# Patient Record
Sex: FEMALE | Race: WHITE | NOT HISPANIC OR LATINO | Employment: OTHER | URBAN - METROPOLITAN AREA
[De-identification: names, ages, dates, MRNs, and addresses within clinical notes are randomized per-mention and may not be internally consistent; named-entity substitution may affect disease eponyms.]

---

## 2017-06-09 ENCOUNTER — CLINICAL SUPPORT NO REQUIREMENTS (OUTPATIENT)
Dept: CARDIOLOGY | Facility: CLINIC | Age: 72
End: 2017-06-09

## 2017-06-09 DIAGNOSIS — I50.9 CHRONIC CONGESTIVE HEART FAILURE, UNSPECIFIED CONGESTIVE HEART FAILURE TYPE: Primary | ICD-10-CM

## 2017-06-09 PROCEDURE — 93284 PRGRMG EVAL IMPLANTABLE DFB: CPT | Performed by: INTERNAL MEDICINE

## 2017-06-09 PROCEDURE — 93290 INTERROG DEV EVAL ICPMS IP: CPT | Performed by: INTERNAL MEDICINE

## 2017-06-12 ENCOUNTER — TELEPHONE (OUTPATIENT)
Dept: CARDIOLOGY | Facility: CLINIC | Age: 72
End: 2017-06-12

## 2017-06-12 NOTE — TELEPHONE ENCOUNTER
Patient was calling to ask you to provide her the name of the lead that is causing her issues.  Who manufactures the part so she can find out why she cannot have it removed.

## 2017-10-08 ENCOUNTER — CLINICAL SUPPORT NO REQUIREMENTS (OUTPATIENT)
Dept: CARDIOLOGY | Facility: CLINIC | Age: 72
End: 2017-10-08

## 2017-10-08 DIAGNOSIS — I50.22 CHRONIC SYSTOLIC CONGESTIVE HEART FAILURE (HCC): Primary | ICD-10-CM

## 2017-10-08 PROCEDURE — 93295 DEV INTERROG REMOTE 1/2/MLT: CPT | Performed by: INTERNAL MEDICINE

## 2017-10-08 PROCEDURE — 93296 REM INTERROG EVL PM/IDS: CPT | Performed by: INTERNAL MEDICINE

## 2017-10-08 PROCEDURE — 93297 REM INTERROG DEV EVAL ICPMS: CPT | Performed by: INTERNAL MEDICINE

## 2017-10-09 ENCOUNTER — TELEPHONE (OUTPATIENT)
Dept: CARDIOLOGY | Facility: CLINIC | Age: 72
End: 2017-10-09

## 2017-10-09 NOTE — TELEPHONE ENCOUNTER
Alert remote transmission came through 10/8 and was reviewed today.  Alert was for LV lead impedance >3000.  Called pt and she states no new symptoms.  I spoke with Dr Randall and he felt that checking her LV lead when she sees Shoshana Power on 10/18/17 would be fine.  Dr Randall would like to review it when it is done.  This message will be sent to Shoshana and the R[facility] Dept pool.

## 2017-10-11 ENCOUNTER — DOCUMENTATION (OUTPATIENT)
Dept: CARDIOLOGY | Facility: CLINIC | Age: 72
End: 2017-10-11

## 2017-10-11 NOTE — PROGRESS NOTES
Patient came in today to have her alert turned off.  Per remote transmission from 10/8, alert is for LV lead impedance >3000.  I checked her LV lead and it will not capture even at highest setting (8.0V @ 1.5ms).  All other testing was normal.  Per Dr Randall, she can come in for her regularly scheduled appointment with Shoshana Power on 10/18 and he will look at it at that time.

## 2017-10-18 ENCOUNTER — OFFICE VISIT (OUTPATIENT)
Dept: CARDIOLOGY | Facility: CLINIC | Age: 72
End: 2017-10-18

## 2017-10-18 ENCOUNTER — CLINICAL SUPPORT NO REQUIREMENTS (OUTPATIENT)
Dept: CARDIOLOGY | Facility: CLINIC | Age: 72
End: 2017-10-18

## 2017-10-18 ENCOUNTER — HOSPITAL ENCOUNTER (OUTPATIENT)
Dept: GENERAL RADIOLOGY | Facility: HOSPITAL | Age: 72
Discharge: HOME | End: 2017-10-18
Admitting: PHYSICIAN ASSISTANT

## 2017-10-18 VITALS
SYSTOLIC BLOOD PRESSURE: 134 MMHG | BODY MASS INDEX: 31.28 KG/M2 | DIASTOLIC BLOOD PRESSURE: 86 MMHG | OXYGEN SATURATION: 98 % | WEIGHT: 170 LBS | HEART RATE: 63 BPM | HEIGHT: 62 IN

## 2017-10-18 DIAGNOSIS — I48.0 PAROXYSMAL ATRIAL FIBRILLATION (HCC): Primary | ICD-10-CM

## 2017-10-18 DIAGNOSIS — Z98.890 S/P MVR (MITRAL VALVE REPAIR): ICD-10-CM

## 2017-10-18 DIAGNOSIS — I50.22 CHRONIC SYSTOLIC CONGESTIVE HEART FAILURE (HCC): Primary | ICD-10-CM

## 2017-10-18 DIAGNOSIS — Z95.810 BIVENTRICULAR IMPLANTABLE CARDIOVERTER-DEFIBRILLATOR IN SITU: ICD-10-CM

## 2017-10-18 PROBLEM — E78.00 HYPERCHOLESTEROLEMIA: Status: ACTIVE | Noted: 2017-10-18

## 2017-10-18 PROBLEM — R10.9 FLANK PAIN: Status: ACTIVE | Noted: 2017-10-18

## 2017-10-18 PROBLEM — M54.50 LOW BACK PAIN: Status: ACTIVE | Noted: 2017-10-18

## 2017-10-18 PROBLEM — N39.0 URINARY TRACT INFECTION: Status: ACTIVE | Noted: 2017-10-18

## 2017-10-18 PROBLEM — F43.22 ADJUSTMENT DISORDER WITH ANXIOUS MOOD: Status: ACTIVE | Noted: 2017-10-18

## 2017-10-18 PROBLEM — R31.9 HEMATURIA: Status: ACTIVE | Noted: 2017-10-18

## 2017-10-18 PROBLEM — R10.9 ABDOMINAL PAIN: Status: ACTIVE | Noted: 2017-10-18

## 2017-10-18 PROBLEM — K57.90 DIVERTICULOSIS OF INTESTINE: Status: ACTIVE | Noted: 2017-10-18

## 2017-10-18 PROBLEM — F41.9 ANXIETY: Status: ACTIVE | Noted: 2017-10-18

## 2017-10-18 PROBLEM — K57.32 DIVERTICULITIS OF COLON: Status: ACTIVE | Noted: 2017-10-18

## 2017-10-18 PROBLEM — F43.10 POSTTRAUMATIC STRESS DISORDER: Status: ACTIVE | Noted: 2017-10-18

## 2017-10-18 PROBLEM — F98.8 ATTENTION DEFICIT DISORDER: Status: ACTIVE | Noted: 2017-10-18

## 2017-10-18 PROBLEM — F10.10 ALCOHOL ABUSE: Status: ACTIVE | Noted: 2017-10-18

## 2017-10-18 PROCEDURE — 93283 PRGRMG EVAL IMPLANTABLE DFB: CPT | Performed by: INTERNAL MEDICINE

## 2017-10-18 PROCEDURE — 93000 ELECTROCARDIOGRAM COMPLETE: CPT | Performed by: PHYSICIAN ASSISTANT

## 2017-10-18 PROCEDURE — 99214 OFFICE O/P EST MOD 30 MIN: CPT | Performed by: PHYSICIAN ASSISTANT

## 2017-10-18 PROCEDURE — 71020 HC CHEST PA AND LATERAL: CPT

## 2017-10-18 RX ORDER — FLUOXETINE HYDROCHLORIDE 20 MG/1
20 CAPSULE ORAL DAILY
COMMUNITY
Start: 2017-09-30 | End: 2018-02-16

## 2017-10-18 NOTE — PROGRESS NOTES
Date of Office Visit: 10/18/2017  Encounter Provider: CECILIA Camacho  Place of Service: [facility] CARDIOLOGY  Patient Name: Olivia Addison  :1945    Chief Complaint   Patient presents with   • Cardiac Valve Problem     1 year follow up   :     HPI: Olivia Addison is a 72 y.o. female, new to me, who presents today for follow-up.  Old records have been obtained and reviewed by me.  She is a patient of Dr. Trinh's with a past medical history significant for atrial fibrillation, congestive heart failure, alcohol abuse, and mitral regurgitation.  She is status post mitral valve repair and PFO closure.  She was last in our office to see Dr. Trinh on 10/14/2016.  At that visit, she was worried about her heartbeat he felt that she was doing well from a cardiac standpoint.  Because of her concerns, Dr. Trinh checked an echocardiogram on 10/27/2016.  This showed borderline LV hypertrophy, normal EF of 58%, mildly dilated right and left atrium, trace to mild tricuspid regurgitation, and trace mitral regurgitation.  No changes were made to her medical regimen, and she is here today for follow-up.  She also is status post permanent pacemaker implantation, and there has been an issue with her LV lead.  A message was sent on 10/8/2017 stating that the LV lead was no longer working.   Over the past year she's been doing relatively well.  She has gained some weight, and she thinks that this is making her short of breath.  She is currently on a cleansing diet and thinks that this is starting to help.  She's lost 4 pounds.  She denies any chest pain, edema, dizziness, or syncope.  She has no orthopnea or PND.  She does get a little short of breath on exertion, however she thinks it's because she is out of shape and heavier than normal.  She also complains of a tugging sensation at her pacemaker site.      Past Medical History:   Diagnosis Date   • Abdominal pain    • ADHD (attention  deficit hyperactivity disorder)    • Adjustment disorder with anxiety    • Alcohol abuse    • Arrhythmia     ATRIAL FIB   • Cardiomyopathy    • Chest pain    • CHF (congestive heart failure)    • Depression    • Diverticulitis    • Diverticulitis of colon    • Esophageal injury     CHAD-BISWAS SYNDROME   • Flank pain    • Health care maintenance    • Hematuria    • Hypercholesterolemia    • Hyperlipidemia    • Hypertension    • Lower back pain    • Chad-Biswas syndrome    • NSVT (nonsustained ventricular tachycardia)    • AMADO (obstructive sleep apnea)     USES MOUTHGUARDS   • Patent foramen ovale    • PTSD (post-traumatic stress disorder)    • S/P MVR (mitral valve repair)    • S/P TVR (tricuspid valve repair)    • UTI (urinary tract infection)    • VT (ventricular tachycardia)        Past Surgical History:   Procedure Laterality Date   • ABDOMINAL SURGERY     • BIVENTRICULLAR IMPLANTABLE CARDIOVERTER DEFIBRILLATOR PLACEMENT     • BLADDER SUSPENSION     • CARDIAC CATHETERIZATION      03/2012; Normal coronary arteries, severe mitral regurgitation and aortic regurgitation. EF was about 30-35%.; post op the LVSF was nl   • CARDIAC ELECTROPHYSIOLOGY PROCEDURE Left 7/11/2016    Procedure: PPM battery change [employer];  Surgeon: Hunter Faust MD;  Location: [facility] INVASIVE LOCATION;  Service:    • CATARACT EXTRACTION     • EYE SURGERY     • HYSTERECTOMY     • MITRAL VALVE REPAIR/REPLACEMENT  2012 6/12 with Dr Keyes at  with annuloplasty ring   • PATENT FORAMEN OVALE CLOSURE  2012   • TRICUSPID VALVE SURGERY  2012    annuloplasty ring       Social History     Social History   • Marital status:      Spouse name: N/A   • Number of children: N/A   • Years of education: N/A     Occupational History   • Not on file.     Social History Main Topics   • Smoking status: Never Smoker   • Smokeless tobacco: Never Used      Comment: caffine use   • Alcohol use 4.2 oz/week     7 Glasses of wine per week       Comment: occ   • Drug use: No   • Sexual activity: Defer     Other Topics Concern   • Not on file     Social History Narrative       Family History   Problem Relation Age of Onset   • No Known Problems Mother    • Emphysema Father    • Stroke Sister    • No Known Problems Brother    • No Known Problems Maternal Grandmother    • No Known Problems Maternal Grandfather    • No Known Problems Paternal Grandmother    • No Known Problems Paternal Grandfather    • No Known Problems Brother        Review of Systems   Constitution: Negative for chills, fever, malaise/fatigue, weight gain and weight loss.   HENT: Negative for ear pain, hearing loss, nosebleeds and sore throat.    Eyes: Negative for double vision, pain and visual disturbance.   Cardiovascular: Positive for dyspnea on exertion. Negative for chest pain, irregular heartbeat, leg swelling, near-syncope, orthopnea, palpitations, paroxysmal nocturnal dyspnea and syncope.   Respiratory: Negative for cough, shortness of breath, sleep disturbances due to breathing, snoring and wheezing.    Endocrine: Negative for cold intolerance, heat intolerance and polyuria.   Skin: Negative for itching and rash.   Musculoskeletal: Negative for joint pain, joint swelling and myalgias.   Gastrointestinal: Negative for abdominal pain, diarrhea, melena, nausea and vomiting.   Genitourinary: Negative for frequency, hematuria and hesitancy.   Neurological: Negative for excessive daytime sleepiness, headaches, light-headedness, numbness, paresthesias and seizures.   Psychiatric/Behavioral: Negative for altered mental status and depression.   Allergic/Immunologic: Negative.    All other systems reviewed and are negative.      Allergies   Allergen Reactions   • Sulfa Antibiotics          Current Outpatient Prescriptions:   •  acyclovir (ZOVIRAX) 400 MG tablet, Take 400 mg by mouth 2 (two) times a day. Take no more than 5 doses a day., Disp: , Rfl:   •  atorvastatin (LIPITOR) 40 MG tablet,  "Take 40 mg by mouth daily., Disp: , Rfl:   •  buPROPion XL (WELLBUTRIN XL) 300 MG 24 hr tablet, Take 300 mg by mouth Daily., Disp: , Rfl:   •  carvedilol (COREG) 25 MG tablet, Take 25 mg by mouth 2 (two) times a day., Disp: , Rfl:   •  FLUoxetine (PROzac) 20 MG capsule, Take 20 mg by mouth Daily., Disp: , Rfl:   •  furosemide (LASIX) 40 MG tablet, TAKE ONE TABLET BY MOUTH DAILY, Disp: 90 tablet, Rfl: 3  •  KLOR-CON 20 MEQ CR tablet, TAKE ONE TABLET BY MOUTH DAILY, Disp: 90 tablet, Rfl: 3  •  losartan (COZAAR) 50 MG tablet, Take 50 mg by mouth Daily., Disp: , Rfl:      Objective:     Vitals:    10/18/17 1134 10/18/17 1146   BP: 128/82 134/86   BP Location: Right arm Left arm   Pulse: 63    SpO2: 98%    Weight: 170 lb (77.1 kg)    Height: 62\" (157.5 cm)      Body mass index is 31.09 kg/(m^2).    PHYSICAL EXAM:    Physical Exam   Constitutional: She is oriented to person, place, and time. She appears well-developed and well-nourished. No distress.   HENT:   Head: Normocephalic and atraumatic.   Eyes: Pupils are equal, round, and reactive to light.   Neck: No JVD present. No thyromegaly present.   Cardiovascular: Normal rate, regular rhythm, normal heart sounds and intact distal pulses.    No murmur heard.  Pulmonary/Chest: Effort normal and breath sounds normal. No respiratory distress.   Abdominal: Soft. Bowel sounds are normal. She exhibits no distension. There is no splenomegaly or hepatomegaly. There is no tenderness.   Musculoskeletal: Normal range of motion. She exhibits no edema.   Neurological: She is alert and oriented to person, place, and time.   Skin: Skin is warm and dry. She is not diaphoretic. No erythema.   Psychiatric: She has a normal mood and affect. Her behavior is normal. Judgment normal.         ECG 12 Lead  Date/Time: 10/18/2017 1:09 PM  Performed by: NAIMA ROLLE  Authorized by: NAIMA ROLLE   Comparison: compared with previous ECG   Similar to previous ECG  Rhythm: paced  BPM: " 77  Conduction: left bundle branch block  Clinical impression: abnormal ECG  Comments: Indication: Status post pacemaker implantation, mitral valve repair.              Assessment:       Diagnosis Plan   1. Paroxysmal atrial fibrillation  ECG 12 Lead   2. Biventricular implantable cardioverter-defibrillator in situ  XR Chest 2 View    Adult Transthoracic Echo Complete W/ Cont if Necessary Per Protocol    ECG 12 Lead   3. S/P MVR (mitral valve repair)  Adult Transthoracic Echo Complete W/ Cont if Necessary Per Protocol    ECG 12 Lead     Orders Placed This Encounter   Procedures   • XR Chest 2 View     Standing Status:   Future     Number of Occurrences:   1     Standing Expiration Date:   10/18/2018     Order Specific Question:   Reason for Exam:     Answer:   pacemaker   • ECG 12 Lead     This order was created via procedure documentation   • Adult Transthoracic Echo Complete W/ Cont if Necessary Per Protocol     Standing Status:   Future     Order Specific Question:   Reason for exam?     Answer:   Heart Failure, Cardiomyopathy, or Sytemic or Pulmonary Hypertension          Plan:       1.  Atrial Fibrillation and Atrial Flutter  Assessment  • The patient has paroxysmal atrial fibrillation  • This is valvular in etiology  • The patient's CHADS2-VASc score is 2  • A [id number] score of 2 or more is considered a high risk for a thromboembolic event    Plan  • Attempt to maintain sinus rhythm  • Continue beta blocker for rhythm control  • Continue beta blocker for rate control    Subjective - Objective  • She is atrially paced today.  She had some episodes of supposedly atrial fibrillation on her pacemaker interrogation, however they were all less than 1 minute.  For this reason, I do not think that she needs to be anticoagulated.  She herself does not want to take any blood thinners.    2.  Status post mitral valve repair.  Her last echocardiogram was in October 2016 and showed a well functioning mitral and  tricuspid valve.    3.  Status post pacemaker implantation.  Dr. Randall spent some time discussing her faulty LV lead with her today.  It appears that it has not been working for about a week.  His recommendation was to check a chest x-ray today.  Then she will return to see him in November and have an echocardiogram at that time.  If her LV function is normal, there really is no need to replace the LV lead.  If her LV function is down, we will consider replacing the LV lead.  He spent a long time discussing with her that it would be high risk to remove the old lead, and at this time it is not necessary.  I also explained this to her at length.  She has a lot of anxiety surrounding her heart.  She feels like there have been a lot of things that have gone wrong ever since she had her heart surgery, and her family is urging her to go to the [facility] for a second opinion.  I fully support this.  She will let us know what she decides to do.    As always, it has been a pleasure to participate in your patient's care.      Sincerely,         Shoshana Power PA-C

## 2017-10-19 ENCOUNTER — TELEPHONE (OUTPATIENT)
Dept: CARDIOLOGY | Facility: CLINIC | Age: 72
End: 2017-10-19

## 2017-10-19 NOTE — TELEPHONE ENCOUNTER
Left a message for the patient with the results of her chest x-ray.  Unremarkable except for the broken pacemaker wire.

## 2017-10-20 ENCOUNTER — APPOINTMENT (OUTPATIENT)
Dept: GENERAL RADIOLOGY | Facility: HOSPITAL | Age: 72
End: 2017-10-20

## 2017-11-08 ENCOUNTER — CLINICAL SUPPORT NO REQUIREMENTS (OUTPATIENT)
Dept: CARDIOLOGY | Facility: CLINIC | Age: 72
End: 2017-11-08

## 2017-11-08 ENCOUNTER — HOSPITAL ENCOUNTER (OUTPATIENT)
Dept: CARDIOLOGY | Facility: HOSPITAL | Age: 72
Discharge: HOME | End: 2017-11-08
Admitting: PHYSICIAN ASSISTANT

## 2017-11-08 ENCOUNTER — OFFICE VISIT (OUTPATIENT)
Dept: CARDIOLOGY | Facility: CLINIC | Age: 72
End: 2017-11-08

## 2017-11-08 VITALS
SYSTOLIC BLOOD PRESSURE: 132 MMHG | DIASTOLIC BLOOD PRESSURE: 88 MMHG | WEIGHT: 172 LBS | HEART RATE: 62 BPM | HEIGHT: 62 IN | BODY MASS INDEX: 31.65 KG/M2

## 2017-11-08 VITALS
BODY MASS INDEX: 30.91 KG/M2 | WEIGHT: 168 LBS | HEART RATE: 80 BPM | SYSTOLIC BLOOD PRESSURE: 136 MMHG | DIASTOLIC BLOOD PRESSURE: 84 MMHG | HEIGHT: 62 IN

## 2017-11-08 DIAGNOSIS — I48.0 PAROXYSMAL ATRIAL FIBRILLATION (HCC): ICD-10-CM

## 2017-11-08 DIAGNOSIS — Z95.810 BIVENTRICULAR IMPLANTABLE CARDIOVERTER-DEFIBRILLATOR IN SITU: Primary | ICD-10-CM

## 2017-11-08 DIAGNOSIS — Z98.890 S/P MVR (MITRAL VALVE REPAIR): ICD-10-CM

## 2017-11-08 DIAGNOSIS — Z95.810 BIVENTRICULAR IMPLANTABLE CARDIOVERTER-DEFIBRILLATOR IN SITU: ICD-10-CM

## 2017-11-08 DIAGNOSIS — I50.22 CHRONIC SYSTOLIC CONGESTIVE HEART FAILURE (HCC): Primary | ICD-10-CM

## 2017-11-08 DIAGNOSIS — I38 VALVULAR DISEASE: ICD-10-CM

## 2017-11-08 LAB
ASCENDING AORTA: 3.1 CM
BH CV ECHO MEAS - ACS: 2 CM
BH CV ECHO MEAS - AO MAX PG (FULL): 4.1 MMHG
BH CV ECHO MEAS - AO MAX PG: 9.5 MMHG
BH CV ECHO MEAS - AO MEAN PG (FULL): 2.2 MMHG
BH CV ECHO MEAS - AO MEAN PG: 5.4 MMHG
BH CV ECHO MEAS - AO ROOT AREA (BSA CORRECTED): 1.8
BH CV ECHO MEAS - AO ROOT AREA: 8 CM^2
BH CV ECHO MEAS - AO ROOT DIAM: 3.2 CM
BH CV ECHO MEAS - AO V2 MAX: 154.2 CM/SEC
BH CV ECHO MEAS - AO V2 MEAN: 110 CM/SEC
BH CV ECHO MEAS - AO V2 VTI: 32.9 CM
BH CV ECHO MEAS - AVA(I,A): 1.5 CM^2
BH CV ECHO MEAS - AVA(I,D): 1.5 CM^2
BH CV ECHO MEAS - AVA(V,A): 1.7 CM^2
BH CV ECHO MEAS - AVA(V,D): 1.7 CM^2
BH CV ECHO MEAS - BSA(HAYCOCK): 1.9 M^2
BH CV ECHO MEAS - BSA: 1.8 M^2
BH CV ECHO MEAS - BZI_BMI: 30.7 KILOGRAMS/M^2
BH CV ECHO MEAS - BZI_METRIC_HEIGHT: 157.5 CM
BH CV ECHO MEAS - BZI_METRIC_WEIGHT: 76.2 KG
BH CV ECHO MEAS - CONTRAST EF (2CH): 52.5 ML/M^2
BH CV ECHO MEAS - CONTRAST EF 4CH: 53.7 ML/M^2
BH CV ECHO MEAS - EDV(MOD-SP2): 61 ML
BH CV ECHO MEAS - EDV(MOD-SP4): 67 ML
BH CV ECHO MEAS - EDV(TEICH): 128.7 ML
BH CV ECHO MEAS - EF(CUBED): 64 %
BH CV ECHO MEAS - EF(MOD-SP2): 52.5 %
BH CV ECHO MEAS - EF(MOD-SP4): 53.7 %
BH CV ECHO MEAS - EF(TEICH): 55.2 %
BH CV ECHO MEAS - ESV(MOD-SP2): 29 ML
BH CV ECHO MEAS - ESV(MOD-SP4): 31 ML
BH CV ECHO MEAS - ESV(TEICH): 57.7 ML
BH CV ECHO MEAS - FS: 28.9 %
BH CV ECHO MEAS - IVS/LVPW: 1
BH CV ECHO MEAS - IVSD: 1.1 CM
BH CV ECHO MEAS - LAT PEAK E' VEL: 4 CM/SEC
BH CV ECHO MEAS - LV DIASTOLIC VOL/BSA (35-75): 37.7 ML/M^2
BH CV ECHO MEAS - LV MASS(C)D: 223.6 GRAMS
BH CV ECHO MEAS - LV MASS(C)DI: 126 GRAMS/M^2
BH CV ECHO MEAS - LV MAX PG: 5.4 MMHG
BH CV ECHO MEAS - LV MEAN PG: 3.2 MMHG
BH CV ECHO MEAS - LV SYSTOLIC VOL/BSA (12-30): 17.5 ML/M^2
BH CV ECHO MEAS - LV V1 MAX: 116.4 CM/SEC
BH CV ECHO MEAS - LV V1 MEAN: 84.3 CM/SEC
BH CV ECHO MEAS - LV V1 VTI: 22.3 CM
BH CV ECHO MEAS - LVIDD: 5.2 CM
BH CV ECHO MEAS - LVIDS: 3.7 CM
BH CV ECHO MEAS - LVLD AP2: 5.3 CM
BH CV ECHO MEAS - LVLD AP4: 5.8 CM
BH CV ECHO MEAS - LVLS AP2: 4.7 CM
BH CV ECHO MEAS - LVLS AP4: 5.1 CM
BH CV ECHO MEAS - LVOT AREA (M): 2.3 CM^2
BH CV ECHO MEAS - LVOT AREA: 2.2 CM^2
BH CV ECHO MEAS - LVOT DIAM: 1.7 CM
BH CV ECHO MEAS - LVPWD: 1.1 CM
BH CV ECHO MEAS - MED PEAK E' VEL: 5 CM/SEC
BH CV ECHO MEAS - MV A DUR: 0.17 SEC
BH CV ECHO MEAS - MV A MAX VEL: 92.4 CM/SEC
BH CV ECHO MEAS - MV DEC SLOPE: 347.3 CM/SEC^2
BH CV ECHO MEAS - MV DEC TIME: 0.29 SEC
BH CV ECHO MEAS - MV E MAX VEL: 133.4 CM/SEC
BH CV ECHO MEAS - MV E/A: 1.4
BH CV ECHO MEAS - MV MAX PG: 6.7 MMHG
BH CV ECHO MEAS - MV MEAN PG: 2.7 MMHG
BH CV ECHO MEAS - MV P1/2T MAX VEL: 134.8 CM/SEC
BH CV ECHO MEAS - MV P1/2T: 113.7 MSEC
BH CV ECHO MEAS - MV V2 MAX: 129 CM/SEC
BH CV ECHO MEAS - MV V2 MEAN: 76.4 CM/SEC
BH CV ECHO MEAS - MV V2 VTI: 43.3 CM
BH CV ECHO MEAS - MVA P1/2T LCG: 1.6 CM^2
BH CV ECHO MEAS - MVA(P1/2T): 1.9 CM^2
BH CV ECHO MEAS - MVA(VTI): 1.1 CM^2
BH CV ECHO MEAS - PA ACC TIME: 0.08 SEC
BH CV ECHO MEAS - PA MAX PG (FULL): 2.6 MMHG
BH CV ECHO MEAS - PA MAX PG: 3.6 MMHG
BH CV ECHO MEAS - PA PR(ACCEL): 44.1 MMHG
BH CV ECHO MEAS - PA V2 MAX: 95 CM/SEC
BH CV ECHO MEAS - PVA(V,A): 1.7 CM^2
BH CV ECHO MEAS - PVA(V,D): 1.7 CM^2
BH CV ECHO MEAS - QP/QS: 0.55
BH CV ECHO MEAS - RAP SYSTOLE: 3 MMHG
BH CV ECHO MEAS - RV MAX PG: 1 MMHG
BH CV ECHO MEAS - RV MEAN PG: 0.57 MMHG
BH CV ECHO MEAS - RV V1 MAX: 50.4 CM/SEC
BH CV ECHO MEAS - RV V1 MEAN: 35.5 CM/SEC
BH CV ECHO MEAS - RV V1 VTI: 8.4 CM
BH CV ECHO MEAS - RVOT AREA: 3.2 CM^2
BH CV ECHO MEAS - RVOT DIAM: 2 CM
BH CV ECHO MEAS - SI(AO): 147.7 ML/M^2
BH CV ECHO MEAS - SI(CUBED): 50.3 ML/M^2
BH CV ECHO MEAS - SI(LVOT): 27.8 ML/M^2
BH CV ECHO MEAS - SI(MOD-SP2): 18 ML/M^2
BH CV ECHO MEAS - SI(MOD-SP4): 20.3 ML/M^2
BH CV ECHO MEAS - SI(TEICH): 40 ML/M^2
BH CV ECHO MEAS - SUP REN AO DIAM: 2.3 CM
BH CV ECHO MEAS - SV(AO): 262.2 ML
BH CV ECHO MEAS - SV(CUBED): 89.3 ML
BH CV ECHO MEAS - SV(LVOT): 49.3 ML
BH CV ECHO MEAS - SV(MOD-SP2): 32 ML
BH CV ECHO MEAS - SV(MOD-SP4): 36 ML
BH CV ECHO MEAS - SV(RVOT): 27.1 ML
BH CV ECHO MEAS - SV(TEICH): 71 ML
BH CV XLRA - RV BASE: 3.4 CM
E/E' RATIO: 30.5
LEFT ATRIUM VOLUME INDEX: 30.5 ML/M2
LV EF 2D ECHO EST: 53 %
MAXIMAL PREDICTED HEART RATE: 149 BPM
SINUS: 2.8 CM
STJ: 3 CM
STRESS TARGET HR: 127 BPM

## 2017-11-08 PROCEDURE — 93283 PRGRMG EVAL IMPLANTABLE DFB: CPT | Performed by: INTERNAL MEDICINE

## 2017-11-08 PROCEDURE — 93306 TTE W/DOPPLER COMPLETE: CPT | Performed by: INTERNAL MEDICINE

## 2017-11-08 PROCEDURE — 93000 ELECTROCARDIOGRAM COMPLETE: CPT | Performed by: INTERNAL MEDICINE

## 2017-11-08 PROCEDURE — 93306 TTE W/DOPPLER COMPLETE: CPT

## 2017-11-08 PROCEDURE — 99213 OFFICE O/P EST LOW 20 MIN: CPT | Performed by: INTERNAL MEDICINE

## 2017-11-08 PROCEDURE — 93290 INTERROG DEV EVAL ICPMS IP: CPT | Performed by: INTERNAL MEDICINE

## 2017-11-08 RX ORDER — VENLAFAXINE HYDROCHLORIDE 75 MG/1
75 CAPSULE, EXTENDED RELEASE ORAL
COMMUNITY
Start: 2017-10-26 | End: 2018-10-26

## 2017-11-08 NOTE — PROGRESS NOTES
Date of Office Visit: 2017  Encounter Provider: Vincent Randall MD  Place of Service: [facility] CARDIOLOGY  Patient Name: Olivia Addison  : 1945    Subjective:     Encounter Date:2017      Patient ID: Olivia Addison is a 72 y.o. female who has a cc of broken LV lead and is here for decision on that. The CRTD put in  at  EF was bad then    -- valve repair by [facility]   Gen change of the CRT aug 2016 -- normal then  Also the LV is Star-fix   The echo now is totally normal and the lead has been broken for 2 months.     She feels physically good.   Mild hall.   No angina CP  She has some discomfort at the site of the CRT>   No edema.   Exercise tolerance: walks -- up to a mile.     There have been no hospital admission since the last visit.     There have been no bleeding events.       Past Medical History:   Diagnosis Date   • Abdominal pain    • ADHD (attention deficit hyperactivity disorder)    • Adjustment disorder with anxiety    • Alcohol abuse    • Arrhythmia     ATRIAL FIB   • Cardiomyopathy    • Chest pain    • CHF (congestive heart failure)    • Depression    • Diverticulitis    • Diverticulitis of colon    • Esophageal injury     ESTRELLITA-RAMOS SYNDROME   • Flank pain    • Health care maintenance    • Hematuria    • Hypercholesterolemia    • Hyperlipidemia    • Hypertension    • Lower back pain    • Estrellita-Ramos syndrome    • NSVT (nonsustained ventricular tachycardia)    • AMADO (obstructive sleep apnea)     USES MOUTHGUARDS   • Patent foramen ovale    • PTSD (post-traumatic stress disorder)    • S/P MVR (mitral valve repair)    • S/P TVR (tricuspid valve repair)    • UTI (urinary tract infection)    • VT (ventricular tachycardia)        Social History     Social History   • Marital status:      Spouse name: N/A   • Number of children: N/A   • Years of education: N/A     Occupational History   • Not on file.     Social History Main Topics  "  • Smoking status: Never Smoker   • Smokeless tobacco: Never Used      Comment: caffine use   • Alcohol use 4.2 oz/week     7 Glasses of wine per week      Comment: occ   • Drug use: No   • Sexual activity: Defer     Other Topics Concern   • Not on file     Social History Narrative       Review of Systems   Constitution: Negative for fever and night sweats.   HENT: Negative for ear pain and stridor.    Eyes: Negative for discharge and visual halos.   Cardiovascular: Negative for cyanosis.   Respiratory: Negative for hemoptysis and sputum production.    Hematologic/Lymphatic: Negative for adenopathy.   Skin: Negative for nail changes and unusual hair distribution.   Musculoskeletal: Negative for gout and joint swelling.   Gastrointestinal: Negative for bowel incontinence and flatus.   Genitourinary: Negative for dysuria and flank pain.   Neurological: Negative for seizures and tremors.   Psychiatric/Behavioral: Negative for altered mental status. The patient is not nervous/anxious.             Objective:     Vitals:    11/08/17 0901   BP: 132/88   Pulse: 62   Weight: 172 lb (78 kg)   Height: 62\" (157.5 cm)         Physical Exam   Constitutional: She is oriented to person, place, and time.   HENT:   Head: Normocephalic and atraumatic.   Eyes: Right eye exhibits no discharge. Left eye exhibits no discharge.   Neck: No JVD present. No thyromegaly present.   Cardiovascular: Normal rate and regular rhythm.  Exam reveals no gallop and no friction rub.    No murmur heard.  Pulmonary/Chest: Effort normal and breath sounds normal. She has no rales.   Abdominal: Soft. Bowel sounds are normal. There is no tenderness.   Musculoskeletal: Normal range of motion. She exhibits no edema or deformity.   Neurological: She is alert and oriented to person, place, and time. She exhibits normal muscle tone.   Skin: Skin is warm and dry. No erythema.   Psychiatric: She has a normal mood and affect. Her behavior is normal. Thought content " normal.         ECG 12 Lead  Date/Time: 11/8/2017 9:50 AM  Performed by: CORINNE GALINDO  Authorized by: CORINNE GALINDO   Comparison: compared with previous ECG   Similar to previous ECG  Rhythm: sinus rhythm and paced  Conduction: left bundle branch block  Clinical impression: abnormal ECG            Lab Review:       Assessment:          Diagnosis Plan   1. Biventricular implantable cardioverter-defibrillator in situ     2. Valvular disease            Plan:       Really good news today: she feels well w -out any hf symptoms  The echo shows superb LV function  Exam ok  I would def not mess w her CRT-D  I would follow her clinically but if no HF symptoms   I have prog the ICD to pace as little as possible to save battery.                     I spent 20 minutes or more w pt and chart.

## 2018-01-25 RX ORDER — POTASSIUM CHLORIDE 20 MEQ/1
TABLET, EXTENDED RELEASE ORAL
Qty: 90 TABLET | Refills: 2 | Status: SHIPPED | OUTPATIENT
Start: 2018-01-25 | End: 2020-05-22

## 2018-02-16 ENCOUNTER — CLINICAL SUPPORT NO REQUIREMENTS (OUTPATIENT)
Dept: CARDIOLOGY | Facility: CLINIC | Age: 73
End: 2018-02-16

## 2018-02-16 ENCOUNTER — OFFICE VISIT (OUTPATIENT)
Dept: CARDIOLOGY | Facility: CLINIC | Age: 73
End: 2018-02-16

## 2018-02-16 VITALS
WEIGHT: 175.2 LBS | HEIGHT: 62 IN | DIASTOLIC BLOOD PRESSURE: 108 MMHG | BODY MASS INDEX: 32.24 KG/M2 | SYSTOLIC BLOOD PRESSURE: 160 MMHG | HEART RATE: 73 BPM

## 2018-02-16 DIAGNOSIS — I44.7 LEFT BUNDLE BRANCH BLOCK (LBBB): ICD-10-CM

## 2018-02-16 DIAGNOSIS — Z98.890 S/P MVR (MITRAL VALVE REPAIR): Primary | ICD-10-CM

## 2018-02-16 DIAGNOSIS — I10 ESSENTIAL HYPERTENSION: ICD-10-CM

## 2018-02-16 DIAGNOSIS — F10.10 ALCOHOL ABUSE: ICD-10-CM

## 2018-02-16 DIAGNOSIS — I50.22 CHRONIC SYSTOLIC CONGESTIVE HEART FAILURE (HCC): Primary | ICD-10-CM

## 2018-02-16 DIAGNOSIS — I48.0 PAROXYSMAL ATRIAL FIBRILLATION (HCC): ICD-10-CM

## 2018-02-16 DIAGNOSIS — Z98.890 H/O TRICUSPID VALVE REPAIR: ICD-10-CM

## 2018-02-16 PROCEDURE — 93290 INTERROG DEV EVAL ICPMS IP: CPT | Performed by: INTERNAL MEDICINE

## 2018-02-16 PROCEDURE — 93000 ELECTROCARDIOGRAM COMPLETE: CPT | Performed by: INTERNAL MEDICINE

## 2018-02-16 PROCEDURE — 99214 OFFICE O/P EST MOD 30 MIN: CPT | Performed by: INTERNAL MEDICINE

## 2018-02-16 PROCEDURE — 93283 PRGRMG EVAL IMPLANTABLE DFB: CPT | Performed by: INTERNAL MEDICINE

## 2018-02-16 NOTE — PROGRESS NOTES
Date of Office Visit: 2018  Encounter Provider: Trenton Trinh MD  Place of Service: [facility] CARDIOLOGY  Patient Name: Olivia Addison  :1945  [id number]    Chief Complaint   Patient presents with   • Congestive Heart Failure   • Cardiac Valve Problem   :     HPI: Olivia Addison is a 72 y.o. female  she's had a history in the past of a cardiomyopathy that was due to valvular heart disease she had a mitral valve repair and tricuspid valve repair and a complete recovery of her LV function she is here for follow-up today and has been doing well she's a little bit flustered because she did not know she had a pacemaker appointment and she thinks that's why her blood pressure is high today.  Her one of her leads in her biventricular pacemaker is not working but she does not really need it anymore as her LV function is recovered.  She exercises without limitation and she feels that her blood pressure is better at home that it is here    Past Medical History:   Diagnosis Date   • Abdominal pain    • ADHD (attention deficit hyperactivity disorder)    • Adjustment disorder with anxiety    • Alcohol abuse    • Arrhythmia     ATRIAL FIB   • Cardiomyopathy    • Chest pain    • CHF (congestive heart failure)    • Depression    • Diverticulitis    • Diverticulitis of colon    • Esophageal injury     ESTRELLITA-BISWAS SYNDROME   • Flank pain    • Health care maintenance    • Hematuria    • Hypercholesterolemia    • Hyperlipidemia    • Hypertension    • Lower back pain    • Estrellita-Biswas syndrome    • NSVT (nonsustained ventricular tachycardia)    • AMADO (obstructive sleep apnea)     USES MOUTHGUARDS   • Patent foramen ovale    • PTSD (post-traumatic stress disorder)    • S/P MVR (mitral valve repair)    • S/P TVR (tricuspid valve repair)    • UTI (urinary tract infection)    • VT (ventricular tachycardia)        Past Surgical History:   Procedure Laterality Date   • ABDOMINAL SURGERY      • BIVENTRICULLAR IMPLANTABLE CARDIOVERTER DEFIBRILLATOR PLACEMENT     • BLADDER SUSPENSION     • CARDIAC CATHETERIZATION      03/2012; Normal coronary arteries, severe mitral regurgitation and aortic regurgitation. EF was about 30-35%.; post op the LVSF was nl   • CARDIAC ELECTROPHYSIOLOGY PROCEDURE Left 7/11/2016    Procedure: PPM battery change MEDTRONIC;  Surgeon: Hunter Faust MD;  Location: [facility] INVASIVE LOCATION;  Service:    • CATARACT EXTRACTION     • EYE SURGERY     • HYSTERECTOMY     • MITRAL VALVE REPAIR/REPLACEMENT  2012 6/12 with Dr Keyes at  with annuloplasty ring   • PATENT FORAMEN OVALE CLOSURE  2012   • TRICUSPID VALVE SURGERY  2012    annuloplasty ring       Social History     Social History   • Marital status:      Spouse name: N/A   • Number of children: N/A   • Years of education: N/A     Occupational History   • Not on file.     Social History Main Topics   • Smoking status: Never Smoker   • Smokeless tobacco: Never Used      Comment: caffine use   • Alcohol use 4.2 oz/week     7 Glasses of wine per week      Comment: occ   • Drug use: No   • Sexual activity: Defer     Other Topics Concern   • Not on file     Social History Narrative       Family History   Problem Relation Age of Onset   • No Known Problems Mother    • Emphysema Father    • Stroke Sister    • No Known Problems Brother    • No Known Problems Maternal Grandmother    • No Known Problems Maternal Grandfather    • No Known Problems Paternal Grandmother    • No Known Problems Paternal Grandfather    • No Known Problems Brother        Review of Systems   Constitution: Negative for decreased appetite, fever, malaise/fatigue and weight loss.   HENT: Negative for nosebleeds.    Eyes: Negative for double vision.   Cardiovascular: Negative for chest pain, claudication, cyanosis, dyspnea on exertion, irregular heartbeat, leg swelling, near-syncope, orthopnea, palpitations, paroxysmal nocturnal dyspnea and  "syncope.   Respiratory: Negative for cough, hemoptysis and shortness of breath.    Hematologic/Lymphatic: Negative for bleeding problem.   Skin: Negative for rash.   Musculoskeletal: Negative for falls and myalgias.   Gastrointestinal: Negative for hematochezia, jaundice, melena, nausea and vomiting.   Genitourinary: Negative for hematuria.   Neurological: Negative for dizziness and seizures.   Psychiatric/Behavioral: Negative for altered mental status and memory loss.       Allergies   Allergen Reactions   • Sulfa Antibiotics          Current Outpatient Prescriptions:   •  acyclovir (ZOVIRAX) 400 MG tablet, Take 400 mg by mouth 2 (two) times a day. Take no more than 5 doses a day., Disp: , Rfl:   •  atorvastatin (LIPITOR) 40 MG tablet, Take 40 mg by mouth daily., Disp: , Rfl:   •  buPROPion XL (WELLBUTRIN XL) 300 MG 24 hr tablet, Take 300 mg by mouth Daily., Disp: , Rfl:   •  carvedilol (COREG) 25 MG tablet, Take 25 mg by mouth 2 (two) times a day., Disp: , Rfl:   •  furosemide (LASIX) 40 MG tablet, TAKE ONE TABLET BY MOUTH DAILY, Disp: 90 tablet, Rfl: 3  •  losartan (COZAAR) 50 MG tablet, Take 50 mg by mouth Daily., Disp: , Rfl:   •  potassium chloride (K-DUR,KLOR-CON) 20 MEQ CR tablet, TAKE ONE TABLET BY MOUTH DAILY, Disp: 90 tablet, Rfl: 2  •  venlafaxine XR (EFFEXOR-XR) 75 MG 24 hr capsule, Take 75 mg by mouth., Disp: , Rfl:      Objective:     Vitals:    02/16/18 1034   BP: (!) 160/108   Pulse: 73   Weight: 79.5 kg (175 lb 3.2 oz)   Height: 157.5 cm (62\")     Body mass index is 32.04 kg/(m^2).    Physical Exam   Constitutional: She is oriented to person, place, and time. She appears well-developed and well-nourished.   HENT:   Head: Normocephalic.   Eyes: No scleral icterus.   Neck: No JVD present. No thyromegaly present.   Cardiovascular: Normal rate, regular rhythm and normal heart sounds.  Exam reveals no gallop and no friction rub.    No murmur heard.  Pulmonary/Chest: Effort normal and breath sounds " normal. She has no wheezes. She has no rales.   Abdominal: Soft. There is no hepatosplenomegaly. There is no tenderness.   Musculoskeletal: Normal range of motion. She exhibits no edema.   Lymphadenopathy:     She has no cervical adenopathy.   Neurological: She is alert and oriented to person, place, and time.   Skin: Skin is warm and dry. No rash noted.   Psychiatric: She has a normal mood and affect.         ECG 12 Lead  Date/Time: 2/16/2018 11:01 AM  Performed by: VALERIE TRINH  Authorized by: VALERIE TRINH   Comparison: compared with previous ECG   Similar to previous ECG  Rhythm: sinus rhythm  Conduction: complete LBBB  Clinical impression: abnormal ECG             Assessment:       Diagnosis Plan   1. S/P MVR (mitral valve repair)     2. Alcohol abuse     3. H/O tricuspid valve repair     4. Essential hypertension     5. Left bundle branch block (LBBB)            Plan:       She is doing well and I think will continue to do well.  I have advised her to check her blood pressure and if it is high at home we will increase her losartan to 100 mg daily.  She is going to do this.  She does not have any evidence of heart failure.  Her valve sounds great.  We have echoed her a number of times and her repair is good.  I think she is doing well.  I am not going to make any changes.  I will have her come back and see Shoshana Power in one year and see me in two.          As always, it has been a pleasure to participate in your patient's care.      Sincerely,       Valerie Trinh MD

## 2018-04-23 RX ORDER — FUROSEMIDE 40 MG/1
TABLET ORAL
Qty: 90 TABLET | Refills: 2 | Status: ON HOLD | OUTPATIENT
Start: 2018-04-23 | End: 2023-02-09

## 2018-05-22 ENCOUNTER — CLINICAL SUPPORT NO REQUIREMENTS (OUTPATIENT)
Dept: CARDIOLOGY | Facility: CLINIC | Age: 73
End: 2018-05-22

## 2018-05-22 DIAGNOSIS — I50.22 CHRONIC SYSTOLIC CONGESTIVE HEART FAILURE (HCC): ICD-10-CM

## 2018-05-22 DIAGNOSIS — I42.8 NONISCHEMIC CARDIOMYOPATHY (HCC): Primary | ICD-10-CM

## 2018-05-22 DIAGNOSIS — I48.0 PAROXYSMAL ATRIAL FIBRILLATION (HCC): ICD-10-CM

## 2018-05-22 PROCEDURE — 93297 REM INTERROG DEV EVAL ICPMS: CPT | Performed by: INTERNAL MEDICINE

## 2018-05-22 PROCEDURE — 93296 REM INTERROG EVL PM/IDS: CPT | Performed by: INTERNAL MEDICINE

## 2018-05-22 PROCEDURE — 93295 DEV INTERROG REMOTE 1/2/MLT: CPT | Performed by: INTERNAL MEDICINE

## 2019-02-19 ENCOUNTER — OFFICE VISIT (OUTPATIENT)
Dept: CARDIOLOGY | Facility: CLINIC | Age: 74
End: 2019-02-19

## 2019-02-19 VITALS
HEART RATE: 68 BPM | HEIGHT: 62 IN | SYSTOLIC BLOOD PRESSURE: 150 MMHG | DIASTOLIC BLOOD PRESSURE: 92 MMHG | OXYGEN SATURATION: 93 % | WEIGHT: 172 LBS | BODY MASS INDEX: 31.65 KG/M2

## 2019-02-19 DIAGNOSIS — Z98.890 H/O TRICUSPID VALVE REPAIR: ICD-10-CM

## 2019-02-19 DIAGNOSIS — Z98.890 S/P MVR (MITRAL VALVE REPAIR): Primary | ICD-10-CM

## 2019-02-19 PROCEDURE — 99213 OFFICE O/P EST LOW 20 MIN: CPT | Performed by: PHYSICIAN ASSISTANT

## 2019-02-19 PROCEDURE — 93000 ELECTROCARDIOGRAM COMPLETE: CPT | Performed by: PHYSICIAN ASSISTANT

## 2019-02-19 RX ORDER — NALTREXONE HYDROCHLORIDE AND BUPROPION HYDROCHLORIDE 8; 90 MG/1; MG/1
TABLET, EXTENDED RELEASE ORAL
Refills: 2 | COMMUNITY
Start: 2019-01-15 | End: 2019-11-13 | Stop reason: HOSPADM

## 2019-02-19 NOTE — PROGRESS NOTES
Date of Office Visit: 2019  Encounter Provider: CECILIA Romero  Place of Service: [facility] CARDIOLOGY  Patient Name: Olivia Addison  :1945    Chief Complaint   Patient presents with   • Cardiac Valve Problem   :     HPI: Olivia Addison is a 73 y.o. female who presents today for follow-up.  Old records have been obtained and reviewed by me.  She is a patient of Dr. Trinh's with a past cardiac history significant for cardiomyopathy.  This was secondary to valvular heart disease.  She is status post by V pacemaker implantation as well as a mitral and tricuspid valve repair.  After her valve surgery, she had complete recovery of her LV function.  1 of her pacemaker leads is not working, however Dr. Trinh has remarked that she really does not need it anymore because her LV function returned to normal.  Her last echocardiogram was in 2017.  This showed normal LV function with an EF of 53%, and no significant valvular abnormalities.  Her mitral and tricuspid valve repairs were functioning well.  She was last in our office to see Dr. Trinh on 2018.  At that visit she was doing well without complaints of angina or heart failure.  Her blood pressure was a little elevated, Dr. Trinh asked her to keep track of it at home and if it remained elevated to increase her losartan to 100 mg daily.  No other changes were made to her medical regimen, and she is here today for yearly visit.   Over the past year she has been doing fairly well.  She still has shortness of breath on exertion.  She states that by the time she gets to the top of a flight of stairs that she is out of breath.  This is unchanged and has been going on for at least the past year if not longer.  She denies any chest pain, palpitations, edema, dizziness, or syncope.  She denies any worsening shortness of breath.  In an effort to lose weight, she started a medication called Contrave.  She has  lost 4 pounds in 2 months.  She does not get much exercise, she only walks about 15 minutes twice a week.  She is a retired .      Past Medical History:   Diagnosis Date   • Abdominal pain    • ADHD (attention deficit hyperactivity disorder)    • Adjustment disorder with anxiety    • Alcohol abuse    • Arrhythmia     ATRIAL FIB   • Cardiomyopathy (CMS/HCC)    • Chest pain    • CHF (congestive heart failure) (CMS/HCC)    • Depression    • Diverticulitis    • Diverticulitis of colon    • Esophageal injury     CHAD-BISWAS SYNDROME   • Flank pain    • Health care maintenance    • Hematuria    • Hypercholesterolemia    • Hyperlipidemia    • Hypertension    • Lower back pain    • Chad-Biswas syndrome    • NSVT (nonsustained ventricular tachycardia) (CMS/HCC)    • AMADO (obstructive sleep apnea)     USES MOUTHGUARDS   • Patent foramen ovale    • PTSD (post-traumatic stress disorder)    • S/P MVR (mitral valve repair)    • S/P TVR (tricuspid valve repair)    • UTI (urinary tract infection)    • VT (ventricular tachycardia) (CMS/HCC)        Past Surgical History:   Procedure Laterality Date   • ABDOMINAL SURGERY     • BIVENTRICULLAR IMPLANTABLE CARDIOVERTER DEFIBRILLATOR PLACEMENT     • BLADDER SUSPENSION     • CARDIAC CATHETERIZATION      03/2012; Normal coronary arteries, severe mitral regurgitation and aortic regurgitation. EF was about 30-35%.; post op the LVSF was nl   • CARDIAC ELECTROPHYSIOLOGY PROCEDURE Left 7/11/2016    Procedure: PPM battery change MEDTRONIC;  Surgeon: Hunter Faust MD;  Location: [facility] INVASIVE LOCATION;  Service:    • CATARACT EXTRACTION     • EYE SURGERY     • HYSTERECTOMY     • MITRAL VALVE REPAIR/REPLACEMENT  2012 6/12 with Dr Keyes at  with annuloplasty ring   • PATENT FORAMEN OVALE CLOSURE  2012   • TRICUSPID VALVE SURGERY  2012    annuloplasty ring       Social History     Socioeconomic History   • Marital status:      Spouse name: Not on file    • Number of children: Not on file   • Years of education: Not on file   • Highest education level: Not on file   Social Needs   • Financial resource strain: Not on file   • Food insecurity - worry: Not on file   • Food insecurity - inability: Not on file   • Transportation needs - medical: Not on file   • Transportation needs - non-medical: Not on file   Occupational History   • Not on file   Tobacco Use   • Smoking status: Never Smoker   • Smokeless tobacco: Never Used   • Tobacco comment: caffine use   Substance and Sexual Activity   • Alcohol use: Yes     Alcohol/week: 4.2 oz     Types: 7 Glasses of wine per week     Comment: occ   • Drug use: No   • Sexual activity: Defer   Other Topics Concern   • Not on file   Social History Narrative   • Not on file       Family History   Problem Relation Age of Onset   • No Known Problems Mother    • Emphysema Father    • Stroke Sister    • No Known Problems Brother    • No Known Problems Maternal Grandmother    • No Known Problems Maternal Grandfather    • No Known Problems Paternal Grandmother    • No Known Problems Paternal Grandfather    • No Known Problems Brother        Review of Systems   Constitution: Negative for chills, fever and malaise/fatigue.   Cardiovascular: Positive for dyspnea on exertion. Negative for chest pain, leg swelling, near-syncope, orthopnea, palpitations, paroxysmal nocturnal dyspnea and syncope.   Respiratory: Negative for cough and shortness of breath.    Musculoskeletal: Negative for joint pain, joint swelling and myalgias.   Gastrointestinal: Negative for abdominal pain, diarrhea, melena, nausea and vomiting.   Genitourinary: Negative for frequency and hematuria.   Neurological: Negative for light-headedness, numbness, paresthesias and seizures.   Allergic/Immunologic: Negative.    All other systems reviewed and are negative.      Allergies   Allergen Reactions   • Sulfa Antibiotics          Current Outpatient Medications:   •  acyclovir  "(ZOVIRAX) 400 MG tablet, Take 400 mg by mouth 2 (two) times a day. Take no more than 5 doses a day., Disp: , Rfl:   •  atorvastatin (LIPITOR) 40 MG tablet, Take 40 mg by mouth daily., Disp: , Rfl:   •  buPROPion XL (WELLBUTRIN XL) 300 MG 24 hr tablet, Take 300 mg by mouth Daily., Disp: , Rfl:   •  carvedilol (COREG) 25 MG tablet, Take 25 mg by mouth 2 (two) times a day., Disp: , Rfl:   •  CONTRAVE 8-90 MG tablet, PLEASE SEE ATTACHED FOR DETAILED DIRECTIONS, Disp: , Rfl: 2  •  furosemide (LASIX) 40 MG tablet, TAKE ONE TABLET BY MOUTH DAILY, Disp: 90 tablet, Rfl: 2  •  losartan (COZAAR) 50 MG tablet, Take 50 mg by mouth Daily., Disp: , Rfl:   •  potassium chloride (K-DUR,KLOR-CON) 20 MEQ CR tablet, TAKE ONE TABLET BY MOUTH DAILY, Disp: 90 tablet, Rfl: 2      Objective:     Vitals:    02/19/19 0926 02/19/19 0937   BP: 148/88 150/92   BP Location: Right arm Left arm   Pulse: 68    SpO2: 93%    Weight: 78 kg (172 lb)    Height: 157.5 cm (62\")      Body mass index is 31.46 kg/m².    PHYSICAL EXAM:    Physical Exam   Constitutional: She is oriented to person, place, and time. She appears well-developed and well-nourished. No distress.   HENT:   Head: Normocephalic and atraumatic.   Eyes: Pupils are equal, round, and reactive to light.   Neck: No JVD present. No thyromegaly present.   Cardiovascular: Normal rate, regular rhythm, normal heart sounds and intact distal pulses.   No murmur heard.  Pulmonary/Chest: Effort normal and breath sounds normal. No respiratory distress.   Abdominal: Soft. Bowel sounds are normal. She exhibits no distension. There is no splenomegaly or hepatomegaly. There is no tenderness.   Musculoskeletal: Normal range of motion. She exhibits no edema.   Neurological: She is alert and oriented to person, place, and time.   Skin: Skin is warm and dry. She is not diaphoretic. No erythema.   Psychiatric: She has a normal mood and affect. Her behavior is normal. Judgment normal.         ECG 12 " Lead  Date/Time: 2/19/2019 9:48 AM  Performed by: Shoshana Nicole PA  Authorized by: Shoshana Nicole PA   Comparison: compared with previous ECG from 2/16/2018  Similar to previous ECG  Rhythm: sinus rhythm  BPM: 66  Conduction: left bundle branch block    Clinical impression: abnormal EKG  Comments: Indication: Status post mitral and tricuspid valve repair.              Assessment:       Diagnosis Plan   1. S/P MVR (mitral valve repair)  ECG 12 Lead   2. H/O tricuspid valve repair  ECG 12 Lead     Orders Placed This Encounter   Procedures   • ECG 12 Lead     This order was created via procedure documentation          Plan:       Overall I think she is stable and about the same as she was the last time she was in our office.  She had an echocardiogram a little over a year ago that showed well functioning mitral and tricuspid valve repairs.  She has normal LV function.  Her shortness of breath is unchanged.  At this point I do not think that we need to check another echocardiogram as her symptoms are stable and unchanged.  Certainly if her shortness of breath began to worsen I would recheck an echo.  I think that her main issue is that she needs to lose some weight and start exercising.  We had a long discussion about this.  I think she also has some stress in her life, and we talked about stress reduction as well.  She states that her blood pressure at home is normally in the 120s systolic, it is a little elevated today but she was in a rush this morning and did not take her morning medications.  I am not going to make any changes to her medical regimen, and she will follow-up with Dr. Trinh in 1 year or sooner if needed.    As always, it has been a pleasure to participate in your patient's care.      Sincerely,         Shoshana Nicole PA-C

## 2019-06-04 ENCOUNTER — TELEPHONE (OUTPATIENT)
Dept: CARDIOLOGY | Facility: CLINIC | Age: 74
End: 2019-06-04

## 2019-06-04 NOTE — TELEPHONE ENCOUNTER
Patient was in to see her PCP, Dr. Danuta Lynne with [facility] (care everywhere), and was told to inform you that her b/p was elevated.      Looking at the note in care everywhere, her b/p was 160/84.    If you need to speak with patient, she can be reached at [phone].    Thanks!

## 2019-06-06 ENCOUNTER — CLINICAL SUPPORT NO REQUIREMENTS (OUTPATIENT)
Dept: CARDIOLOGY | Facility: CLINIC | Age: 74
End: 2019-06-06

## 2019-06-06 DIAGNOSIS — I50.22 CHRONIC SYSTOLIC CONGESTIVE HEART FAILURE (HCC): Primary | ICD-10-CM

## 2019-06-06 PROCEDURE — 93295 DEV INTERROG REMOTE 1/2/MLT: CPT | Performed by: INTERNAL MEDICINE

## 2019-06-06 PROCEDURE — 93296 REM INTERROG EVL PM/IDS: CPT | Performed by: INTERNAL MEDICINE

## 2019-06-06 NOTE — TELEPHONE ENCOUNTER
It looks like when she saw me back in February I had asked her to keep track of her blood pressure with a blood pressure log.  Let us call her, ask her to keep a blood pressure log at home for a week, and then get her an appointment to come in for a blood pressure check in our office.  Please have her bring her blood pressure log as well as her blood pressure cuff with her to that appointment.  Was also find out exactly what she is taking as far as blood pressure medicines ago.

## 2019-06-07 NOTE — TELEPHONE ENCOUNTER
Called and spoke with patient.  She was very apologetic that she did not remember being instructed to check b/p and keep log at home.  So, we discussed Shoshana's instructions, and patient is agreeable.    I scheduled her for blood pressure check on Thursday June 20th @ 10:30.

## 2019-07-23 ENCOUNTER — CLINICAL SUPPORT NO REQUIREMENTS (OUTPATIENT)
Dept: CARDIOLOGY | Facility: CLINIC | Age: 74
End: 2019-07-23

## 2019-07-23 DIAGNOSIS — I50.22 CHRONIC SYSTOLIC CONGESTIVE HEART FAILURE (HCC): Primary | ICD-10-CM

## 2019-08-01 ENCOUNTER — CLINICAL SUPPORT NO REQUIREMENTS (OUTPATIENT)
Dept: CARDIOLOGY | Facility: CLINIC | Age: 74
End: 2019-08-01

## 2019-08-01 DIAGNOSIS — I50.22 CHRONIC SYSTOLIC CONGESTIVE HEART FAILURE (HCC): Primary | ICD-10-CM

## 2019-08-01 PROCEDURE — 93295 DEV INTERROG REMOTE 1/2/MLT: CPT | Performed by: INTERNAL MEDICINE

## 2019-08-01 PROCEDURE — 93296 REM INTERROG EVL PM/IDS: CPT | Performed by: INTERNAL MEDICINE

## 2019-09-02 ENCOUNTER — HOSPITAL ENCOUNTER (EMERGENCY)
Facility: HOSPITAL | Age: 74
Discharge: HOME | End: 2019-09-02
Attending: EMERGENCY MEDICINE | Admitting: EMERGENCY MEDICINE

## 2019-09-02 ENCOUNTER — APPOINTMENT (OUTPATIENT)
Dept: CT IMAGING | Facility: HOSPITAL | Age: 74
End: 2019-09-02

## 2019-09-02 VITALS
HEIGHT: 62 IN | HEART RATE: 67 BPM | BODY MASS INDEX: 31.83 KG/M2 | WEIGHT: 173 LBS | DIASTOLIC BLOOD PRESSURE: 69 MMHG | RESPIRATION RATE: 15 BRPM | TEMPERATURE: 99.4 F | OXYGEN SATURATION: 94 % | SYSTOLIC BLOOD PRESSURE: 127 MMHG

## 2019-09-02 DIAGNOSIS — K57.92 DIVERTICULITIS: Primary | ICD-10-CM

## 2019-09-02 LAB
ALBUMIN SERPL-MCNC: 4.8 G/DL (ref 3.5–5.2)
ALBUMIN/GLOB SERPL: 1.9 G/DL
ALP SERPL-CCNC: 82 U/L (ref 39–117)
ALT SERPL W P-5'-P-CCNC: 21 U/L (ref 1–33)
ANION GAP SERPL CALCULATED.3IONS-SCNC: 9.1 MMOL/L (ref 5–15)
AST SERPL-CCNC: 25 U/L (ref 1–32)
BACTERIA UR QL AUTO: ABNORMAL /HPF
BASOPHILS # BLD AUTO: 0.03 10*3/MM3 (ref 0–0.2)
BASOPHILS NFR BLD AUTO: 0.3 % (ref 0–1.5)
BILIRUB SERPL-MCNC: 0.4 MG/DL (ref 0.2–1.2)
BILIRUB UR QL STRIP: NEGATIVE
BUN BLD-MCNC: 15 MG/DL (ref 8–23)
BUN/CREAT SERPL: 19.5 (ref 7–25)
CALCIUM SPEC-SCNC: 9.3 MG/DL (ref 8.6–10.5)
CHLORIDE SERPL-SCNC: 93 MMOL/L (ref 98–107)
CLARITY UR: CLEAR
CO2 SERPL-SCNC: 26.9 MMOL/L (ref 22–29)
COLOR UR: YELLOW
CREAT BLD-MCNC: 0.77 MG/DL (ref 0.57–1)
DEPRECATED RDW RBC AUTO: 44.6 FL (ref 37–54)
EOSINOPHIL # BLD AUTO: 0.13 10*3/MM3 (ref 0–0.4)
EOSINOPHIL NFR BLD AUTO: 1.2 % (ref 0.3–6.2)
ERYTHROCYTE [DISTWIDTH] IN BLOOD BY AUTOMATED COUNT: 12.5 % (ref 12.3–15.4)
GFR SERPL CREATININE-BSD FRML MDRD: 73 ML/MIN/1.73
GLOBULIN UR ELPH-MCNC: 2.5 GM/DL
GLUCOSE BLD-MCNC: 100 MG/DL (ref 65–99)
GLUCOSE UR STRIP-MCNC: NEGATIVE MG/DL
HCT VFR BLD AUTO: 39.7 % (ref 34–46.6)
HGB BLD-MCNC: 13.1 G/DL (ref 12–15.9)
HGB UR QL STRIP.AUTO: ABNORMAL
HOLD SPECIMEN: NORMAL
HOLD SPECIMEN: NORMAL
HYALINE CASTS UR QL AUTO: ABNORMAL /LPF
IMM GRANULOCYTES # BLD AUTO: 0.03 10*3/MM3 (ref 0–0.05)
IMM GRANULOCYTES NFR BLD AUTO: 0.3 % (ref 0–0.5)
KETONES UR QL STRIP: NEGATIVE
LEUKOCYTE ESTERASE UR QL STRIP.AUTO: ABNORMAL
LIPASE SERPL-CCNC: 79 U/L (ref 13–60)
LYMPHOCYTES # BLD AUTO: 1.34 10*3/MM3 (ref 0.7–3.1)
LYMPHOCYTES NFR BLD AUTO: 12.4 % (ref 19.6–45.3)
MCH RBC QN AUTO: 31.8 PG (ref 26.6–33)
MCHC RBC AUTO-ENTMCNC: 33 G/DL (ref 31.5–35.7)
MCV RBC AUTO: 96.4 FL (ref 79–97)
MONOCYTES # BLD AUTO: 1.08 10*3/MM3 (ref 0.1–0.9)
MONOCYTES NFR BLD AUTO: 10 % (ref 5–12)
NEUTROPHILS # BLD AUTO: 8.22 10*3/MM3 (ref 1.7–7)
NEUTROPHILS NFR BLD AUTO: 75.8 % (ref 42.7–76)
NITRITE UR QL STRIP: NEGATIVE
NRBC BLD AUTO-RTO: 0 /100 WBC (ref 0–0.2)
PH UR STRIP.AUTO: 5.5 [PH] (ref 5–8)
PLATELET # BLD AUTO: 302 10*3/MM3 (ref 140–450)
PMV BLD AUTO: 9.3 FL (ref 6–12)
POTASSIUM BLD-SCNC: 3.8 MMOL/L (ref 3.5–5.2)
PROT SERPL-MCNC: 7.3 G/DL (ref 6–8.5)
PROT UR QL STRIP: NEGATIVE
RBC # BLD AUTO: 4.12 10*6/MM3 (ref 3.77–5.28)
RBC # UR: ABNORMAL /HPF
REF LAB TEST METHOD: ABNORMAL
SODIUM BLD-SCNC: 129 MMOL/L (ref 136–145)
SP GR UR STRIP: 1.02 (ref 1–1.03)
SQUAMOUS #/AREA URNS HPF: ABNORMAL /HPF
UROBILINOGEN UR QL STRIP: ABNORMAL
WBC NRBC COR # BLD: 10.83 10*3/MM3 (ref 3.4–10.8)
WBC UR QL AUTO: ABNORMAL /HPF
WHOLE BLOOD HOLD SPECIMEN: NORMAL
WHOLE BLOOD HOLD SPECIMEN: NORMAL

## 2019-09-02 PROCEDURE — 80053 COMPREHEN METABOLIC PANEL: CPT | Performed by: EMERGENCY MEDICINE

## 2019-09-02 PROCEDURE — 83690 ASSAY OF LIPASE: CPT | Performed by: EMERGENCY MEDICINE

## 2019-09-02 PROCEDURE — 85025 COMPLETE CBC W/AUTO DIFF WBC: CPT | Performed by: EMERGENCY MEDICINE

## 2019-09-02 PROCEDURE — 96376 TX/PRO/DX INJ SAME DRUG ADON: CPT

## 2019-09-02 PROCEDURE — 96375 TX/PRO/DX INJ NEW DRUG ADDON: CPT

## 2019-09-02 PROCEDURE — 74177 CT ABD & PELVIS W/CONTRAST: CPT

## 2019-09-02 PROCEDURE — 25010000002 PIPERACILLIN SOD-TAZOBACTAM PER 1 G: Performed by: EMERGENCY MEDICINE

## 2019-09-02 PROCEDURE — 81001 URINALYSIS AUTO W/SCOPE: CPT | Performed by: EMERGENCY MEDICINE

## 2019-09-02 PROCEDURE — 25010000002 ONDANSETRON PER 1 MG: Performed by: EMERGENCY MEDICINE

## 2019-09-02 PROCEDURE — 25010000002 MORPHINE PER 10 MG: Performed by: EMERGENCY MEDICINE

## 2019-09-02 PROCEDURE — 25010000002 MORPHINE PER 10 MG

## 2019-09-02 PROCEDURE — 99284 EMERGENCY DEPT VISIT MOD MDM: CPT

## 2019-09-02 PROCEDURE — 96365 THER/PROPH/DIAG IV INF INIT: CPT

## 2019-09-02 PROCEDURE — 25010000002 IOPAMIDOL 61 % SOLUTION: Performed by: EMERGENCY MEDICINE

## 2019-09-02 RX ORDER — MORPHINE SULFATE 2 MG/ML
INJECTION, SOLUTION INTRAMUSCULAR; INTRAVENOUS
Status: COMPLETED
Start: 2019-09-02 | End: 2019-09-02

## 2019-09-02 RX ORDER — ONDANSETRON 2 MG/ML
4 INJECTION INTRAMUSCULAR; INTRAVENOUS ONCE
Status: COMPLETED | OUTPATIENT
Start: 2019-09-02 | End: 2019-09-02

## 2019-09-02 RX ORDER — AMOXICILLIN AND CLAVULANATE POTASSIUM 875; 125 MG/1; MG/1
1 TABLET, FILM COATED ORAL EVERY 12 HOURS
Qty: 20 TABLET | Refills: 0 | Status: SHIPPED | OUTPATIENT
Start: 2019-09-02 | End: 2019-09-18

## 2019-09-02 RX ORDER — SODIUM CHLORIDE 0.9 % (FLUSH) 0.9 %
10 SYRINGE (ML) INJECTION AS NEEDED
Status: DISCONTINUED | OUTPATIENT
Start: 2019-09-02 | End: 2019-09-02 | Stop reason: HOSPADM

## 2019-09-02 RX ORDER — ONDANSETRON 4 MG/1
4 TABLET, FILM COATED ORAL EVERY 6 HOURS PRN
Qty: 10 TABLET | Refills: 0 | Status: SHIPPED | OUTPATIENT
Start: 2019-09-02 | End: 2020-05-22

## 2019-09-02 RX ORDER — MORPHINE SULFATE 2 MG/ML
4 INJECTION, SOLUTION INTRAMUSCULAR; INTRAVENOUS ONCE
Status: COMPLETED | OUTPATIENT
Start: 2019-09-02 | End: 2019-09-02

## 2019-09-02 RX ORDER — IBUPROFEN 600 MG/1
600 TABLET ORAL EVERY 6 HOURS PRN
Qty: 20 TABLET | Refills: 0 | Status: SHIPPED | OUTPATIENT
Start: 2019-09-02 | End: 2020-10-08

## 2019-09-02 RX ADMIN — TAZOBACTAM SODIUM AND PIPERACILLIN SODIUM 4.5 G: 500; 4 INJECTION, SOLUTION INTRAVENOUS at 16:26

## 2019-09-02 RX ADMIN — MORPHINE SULFATE 2 MG: 2 INJECTION, SOLUTION INTRAMUSCULAR; INTRAVENOUS at 17:26

## 2019-09-02 RX ADMIN — MORPHINE SULFATE 2 MG: 2 INJECTION, SOLUTION INTRAMUSCULAR; INTRAVENOUS at 16:22

## 2019-09-02 RX ADMIN — SODIUM CHLORIDE 500 ML: 9 INJECTION, SOLUTION INTRAVENOUS at 15:36

## 2019-09-02 RX ADMIN — ONDANSETRON 4 MG: 2 INJECTION INTRAMUSCULAR; INTRAVENOUS at 16:21

## 2019-09-02 RX ADMIN — IOPAMIDOL 85 ML: 612 INJECTION, SOLUTION INTRAVENOUS at 15:55

## 2019-09-02 NOTE — ED TRIAGE NOTES
Pt reports abd. Pain and chills started 2 days ago, pt reports recently admitted with diverticulitis. Pt denies diarrhea or vomiting.

## 2019-09-02 NOTE — ED PROVIDER NOTES
EMERGENCY DEPARTMENT ENCOUNTER    CHIEF COMPLAINT  Chief Complaint: abd pain  History given by: patient  History limited by: none  Room Number: 15/15  PMD: Danuta Lynne MD      HPI:  Pt is a 74 y.o. female who presents complaining of LLQ abd pain for several days. Pt has also had chills for 2 days with a subjective fever. Pt denies diarrhea and vomiting. Pt states that she was admitted to [facility] for diverticulitis and finished oral cipro and flagyl. Pt states she was seen to her PCP and given rocephin 4 days ago. Family at bedside.       Duration:  Several days  Onset: gradual  Timing: constant  Location: LLQ  Radiation: none  Quality: pain  Intensity/Severity: moderate   Progression: unchanged  Associated Symptoms: chills, subjective  Previous Episodes: hx of diverticulitis   Treatment before arrival: Recent admission to [facility]    PAST MEDICAL HISTORY  Active Ambulatory Problems     Diagnosis Date Noted   • H/O tricuspid valve repair 10/14/2016   • Abdominal pain 10/18/2017   • Abdominal pain, left lower quadrant 10/08/2014   • Acute conjunctivitis 06/13/2016   • Adjustment disorder with anxious mood 10/18/2017   • Alcohol abuse 10/18/2017   • Anxiety 10/18/2017   • Attention deficit disorder 10/18/2017   • Biventricular implantable cardioverter-defibrillator in situ 06/18/2013   • Diverticulitis of colon 10/18/2017   • Diverticulosis of intestine 10/18/2017   • Flank pain 10/18/2017   • 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   • Hematuria 10/18/2017   • Hypercholesterolemia 10/18/2017   • Low back pain 10/18/2017   • AMADO (obstructive sleep apnea) 09/29/2016   • Posttraumatic stress disorder 10/18/2017   • PTSD (post-traumatic stress disorder) 11/19/2014   • Urinary tract infection 10/18/2017   • S/P MVR (mitral valve repair) 10/18/2017   • Valvular disease 11/08/2017   • Essential hypertension 02/16/2018   • Left bundle branch block (LBBB) 02/16/2018     Resolved Ambulatory Problems     Diagnosis Date  Noted   • Atrial fibrillation (CMS/HCC) 06/18/2013   • Cardiomyopathy (CMS/HCC) 06/18/2013     Past Medical History:   Diagnosis Date   • Abdominal pain    • ADHD (attention deficit hyperactivity disorder)    • Adjustment disorder with anxiety    • Alcohol abuse    • Arrhythmia    • Cardiomyopathy (CMS/HCC)    • Chest pain    • CHF (congestive heart failure) (CMS/HCC)    • Depression    • Diverticulitis    • Diverticulitis of colon    • Esophageal injury    • Flank pain    • Health care maintenance    • Hematuria    • Hypercholesterolemia    • Hyperlipidemia    • Hypertension    • Lower back pain    • Estrellita-Biswas syndrome    • NSVT (nonsustained ventricular tachycardia) (CMS/HCC)    • AMADO (obstructive sleep apnea)    • Patent foramen ovale    • PTSD (post-traumatic stress disorder)    • S/P MVR (mitral valve repair)    • S/P TVR (tricuspid valve repair)    • UTI (urinary tract infection)    • VT (ventricular tachycardia) (CMS/[facility])        PAST SURGICAL HISTORY  Past Surgical History:   Procedure Laterality Date   • ABDOMINAL SURGERY     • BIVENTRICULLAR IMPLANTABLE CARDIOVERTER DEFIBRILLATOR PLACEMENT     • BLADDER SUSPENSION     • BREAST SURGERY     • CARDIAC CATHETERIZATION      03/2012; Normal coronary arteries, severe mitral regurgitation and aortic regurgitation. EF was about 30-35%.; post op the LVSF was nl   • CARDIAC ELECTROPHYSIOLOGY PROCEDURE Left 7/11/2016    Procedure: PPM battery change [employer];  Surgeon: Hunter Faust MD;  Location: [facility] INVASIVE LOCATION;  Service:    • CATARACT EXTRACTION     • EYE SURGERY     • HYSTERECTOMY     • MITRAL VALVE REPAIR/REPLACEMENT  2012 6/12 with Dr Keyes at  with annuloplasty ring   • PATENT FORAMEN OVALE CLOSURE  2012   • TRICUSPID VALVE SURGERY  2012    annuloplasty ring       FAMILY HISTORY  Family History   Problem Relation Age of Onset   • No Known Problems Mother    • Emphysema Father    • Stroke Sister    • No Known Problems Brother    •  No Known Problems Maternal Grandmother    • No Known Problems Maternal Grandfather    • No Known Problems Paternal Grandmother    • No Known Problems Paternal Grandfather    • No Known Problems Brother        SOCIAL HISTORY  Social History     Socioeconomic History   • Marital status:      Spouse name: Not on file   • Number of children: Not on file   • Years of education: Not on file   • Highest education level: Not on file   Tobacco Use   • Smoking status: Never Smoker   • Smokeless tobacco: Never Used   • Tobacco comment: caffine use   Substance and Sexual Activity   • Alcohol use: Yes     Alcohol/week: 4.2 oz     Types: 7 Glasses of wine per week     Comment: occ   • Drug use: No   • Sexual activity: Defer       ALLERGIES  Sulfa antibiotics    REVIEW OF SYSTEMS  Review of Systems   Constitutional: Positive for chills and fever (subjective).   HENT: Negative for sore throat.    Eyes: Negative.    Respiratory: Negative for cough and shortness of breath.    Cardiovascular: Negative for chest pain.   Gastrointestinal: Positive for abdominal pain (LLQ). Negative for diarrhea and vomiting.   Genitourinary: Negative for dysuria.   Musculoskeletal: Negative for neck pain.   Skin: Negative for rash.   Allergic/Immunologic: Negative.    Neurological: Negative for weakness, numbness and headaches.   Hematological: Negative.    Psychiatric/Behavioral: Negative.    All other systems reviewed and are negative.      PHYSICAL EXAM  ED Triage Vitals   Temp Heart Rate Resp BP SpO2   09/02/19 1417 09/02/19 1417 09/02/19 1417 09/02/19 1500 09/02/19 1417   99.4 °F (37.4 °C) 71 18 143/82 96 %      Temp src Heart Rate Source Patient Position BP Location FiO2 (%)   09/02/19 1417 -- -- -- --   Tympanic           Physical Exam   Constitutional: She is oriented to person, place, and time. No distress.   HENT:   Head: Normocephalic and atraumatic.   Moist mucous membranes   Eyes: EOM are normal. Pupils are equal, round, and  reactive to light.   Neck: Normal range of motion. Neck supple.   Cardiovascular: Normal rate, regular rhythm and normal heart sounds.   No murmur heard.  Pulmonary/Chest: Effort normal and breath sounds normal. No respiratory distress.   CTAB   Abdominal: Soft. Bowel sounds are normal. She exhibits no distension. There is tenderness in the left lower quadrant. There is no rebound and no guarding.   Musculoskeletal: Normal range of motion. She exhibits no edema (BLE) or tenderness (calf ).   Neurological: She is alert and oriented to person, place, and time. She has normal sensation and normal strength.   Normal neuro exam   Skin: Skin is warm and dry. No rash noted.   Psychiatric: Mood and affect normal.   Nursing note and vitals reviewed.      LAB RESULTS  Lab Results (last 24 hours)     Procedure Component Value Units Date/Time    Urinalysis With Culture If Indicated - Urine, Clean Catch [[id number]]  (Abnormal) Collected:  09/02/19 1456    Specimen:  Urine, Clean Catch Updated:  09/02/19 1527     Color, UA Yellow     Appearance, UA Clear     pH, UA 5.5     Specific Gravity, UA 1.017     Glucose, UA Negative     Ketones, UA Negative     Bilirubin, UA Negative     Blood, UA Moderate (2+)     Protein, UA Negative     Leuk Esterase, UA Small (1+)     Nitrite, UA Negative     Urobilinogen, UA 0.2 E.U./dL    Urinalysis, Microscopic Only - Urine, Clean Catch [[id number]]  (Abnormal) Collected:  09/02/19 1456    Specimen:  Urine, Clean Catch Updated:  09/02/19 1527     RBC, UA 3-5 /HPF      WBC, UA 3-5 /HPF      Bacteria, UA None Seen /HPF      Squamous Epithelial Cells, UA 7-12 /HPF      Hyaline Casts, UA 3-6 /LPF      Methodology Automated Microscopy    CBC & Differential [[id number]] Collected:  09/02/19 1509    Specimen:  Blood Updated:  09/02/19 1534    Narrative:       The following orders were created for panel order CBC & Differential.  Procedure                               Abnormality         Status                      ---------                               -----------         ------                     CBC Auto Differential[[id number]]        Abnormal            Final result                 Please view results for these tests on the individual orders.    Comprehensive Metabolic Panel [[id number]]  (Abnormal) Collected:  09/02/19 1509    Specimen:  Blood Updated:  09/02/19 1540     Glucose 100 mg/dL      BUN 15 mg/dL      Creatinine 0.77 mg/dL      Sodium 129 mmol/L      Potassium 3.8 mmol/L      Chloride 93 mmol/L      CO2 26.9 mmol/L      Calcium 9.3 mg/dL      Total Protein 7.3 g/dL      Albumin 4.80 g/dL      ALT (SGPT) 21 U/L      AST (SGOT) 25 U/L      Alkaline Phosphatase 82 U/L      Total Bilirubin 0.4 mg/dL      eGFR Non African Amer 73 mL/min/1.73      Globulin 2.5 gm/dL      A/G Ratio 1.9 g/dL      BUN/Creatinine Ratio 19.5     Anion Gap 9.1 mmol/L     Narrative:       GFR Normal >60  Chronic Kidney Disease <60  Kidney Failure <15    Lipase [[id number]]  (Abnormal) Collected:  09/02/19 1509    Specimen:  Blood Updated:  09/02/19 1540     Lipase 79 U/L     CBC Auto Differential [[id number]]  (Abnormal) Collected:  09/02/19 1509    Specimen:  Blood Updated:  09/02/19 1534     WBC 10.83 10*3/mm3      RBC 4.12 10*6/mm3      Hemoglobin 13.1 g/dL      Hematocrit 39.7 %      MCV 96.4 fL      MCH 31.8 pg      MCHC 33.0 g/dL      RDW 12.5 %      RDW-SD 44.6 fl      MPV 9.3 fL      Platelets 302 10*3/mm3      Neutrophil % 75.8 %      Lymphocyte % 12.4 %      Monocyte % 10.0 %      Eosinophil % 1.2 %      Basophil % 0.3 %      Immature Grans % 0.3 %      Neutrophils, Absolute 8.22 10*3/mm3      Lymphocytes, Absolute 1.34 10*3/mm3      Monocytes, Absolute 1.08 10*3/mm3      Eosinophils, Absolute 0.13 10*3/mm3      Basophils, Absolute 0.03 10*3/mm3      Immature Grans, Absolute 0.03 10*3/mm3      nRBC 0.0 /100 WBC           I ordered the above labs and reviewed the results    RADIOLOGY  CT Abdomen Pelvis With Contrast      1.  There are localized changes of diverticulitis involving the proximal  sigmoid colon. There is no evidence of perforation or abscess. There is  a very tiny amount of free fluid in the deep pelvis to the left of the  rectal ampulla. The small bowel loops are normal in caliber.  2. The lung bases are clear. The liver, spleen, pancreas, and both  adrenal glands are unremarkable. There are several small right renal  cysts and the kidneys are otherwise unremarkable.  3. There is no aortic aneurysm or retroperitoneal lymphadenopathy. The  remainder of the pelvis is unremarkable.        I ordered the above noted radiological studies. Interpreted by radiologist. Discussed with radiologist (). Reviewed by me in PACS.       PROCEDURES  Procedures      PROGRESS AND CONSULTS     1455-Ordered UA for further evaluation.     1519-Ordered CT abd/pelvis for further evaluation and IVF for hydration.     1610-Per  (radiology) CT abd/pelvis shows mild diverticulitis without perforation or abscess.    1612-Per ED tech, pt is complaining of pain. Ordered zosyn for diverticulitis, zofran for nausea, and morphine for pain.    1617-Rechecked pt. Pt is resting comfortably. Notified pt of CT abd/pelvis which shows mild diverticulitis and WBC-WNL. Discussed the plan to continue IVF. Informed pt that I have ordered zofran for nausea, morphine for her pain, and zosyn for her diverticulitis. Discussed plan to re-evaluate pt after medications. Pt understands and agrees with the plan, all questions answered.    1708-Rechecked pt. Pt is resting comfortably. Notified pt that with mild diverticulitis and her clinical appearance that she can be treated as outpatient. Discussed the plan to discharge the pt home with prescriptions for Augmentin, zofran, and advil. I instructed the pt to follow a clear liquid diet and f/u with her PCP/GI as needed. RTER warnings given including fever, worsening pain, and vomiting. Pt understands and  agrees with the plan, all questions answered.      MEDICAL DECISION MAKING  Results were reviewed/discussed with the patient and they were also made aware of online access. Pt also made aware that some labs, such as cultures, will not be resulted during ER visit and follow up with PMD is necessary.     MDM  Number of Diagnoses or Management Options  Diverticulitis:      Amount and/or Complexity of Data Reviewed  Clinical lab tests: reviewed and ordered (WBC-10.83  creatinine-0.77  sodium-129  lipase-79  UA results)  Tests in the radiology section of CPT®: reviewed and ordered (CT abd/pelvis-diverticulitis)  Discussion of test results with the performing providers: yes ()  Decide to obtain previous medical records or to obtain history from someone other than the patient: yes  Review and summarize past medical records: yes (Pt was admitted to [facility] on 8/22 with acute diverticulitis. Pt was d/c with cipro and flagyl. )  Independent visualization of images, tracings, or specimens: yes           DIAGNOSIS  Final diagnoses:   Diverticulitis       DISPOSITION  DISCHARGE    Patient discharged in stable condition.    Reviewed implications of results, diagnosis, meds, responsibility to follow up, warning signs and symptoms of possible worsening, potential complications and reasons to return to ER.    Patient/Family voiced understanding of above instructions.    Discussed plan for discharge, as there is no emergent indication for admission. Patient referred to primary care provider for BP management due to today's BP. Pt/family is agreeable and understands need for follow up and repeat testing.  Pt is aware that discharge does not mean that nothing is wrong but it indicates no emergency is present that requires admission and they must continue care with follow-up as given below or physician of their choice.     FOLLOW-UP  Danuta Lynne MD  [address]  [address]  [facility]  [id number]  [phone]    In 3 days  For recheck         Medication List      New Prescriptions    amoxicillin-clavulanate 875-125 MG per tablet  Commonly known as:  AUGMENTIN  Take 1 tablet by mouth Every 12 (Twelve) Hours.     ibuprofen 600 MG tablet  Commonly known as:  ADVIL,MOTRIN  Take 1 tablet by mouth Every 6 (Six) Hours As Needed for Mild Pain .     ondansetron 4 MG tablet  Commonly known as:  ZOFRAN  Take 1 tablet by mouth Every 6 (Six) Hours As Needed for Nausea or   Vomiting.              Latest Documented Vital Signs:  As of 5:18 PM  BP- 143/82 HR- 71 Temp- 99.4 °F (37.4 °C) (Tympanic) O2 sat- 96%    --  Documentation assistance provided by mireya Valle for MD Mimi.  Information recorded by the scribe was done at my direction and has been verified and validated by me.       Merline Valle  09/02/19 1711       Enrique Zavala MD  09/02/19 2021

## 2019-09-17 PROBLEM — I50.9 CHF (CONGESTIVE HEART FAILURE): Status: ACTIVE | Noted: 2018-02-16

## 2019-09-17 PROBLEM — E66.811 CLASS 1 OBESITY IN ADULT: Status: ACTIVE | Noted: 2019-08-23

## 2019-09-17 PROBLEM — K57.92 DIVERTICULITIS: Status: ACTIVE | Noted: 2019-08-23

## 2019-09-17 PROBLEM — E66.9 CLASS 1 OBESITY IN ADULT: Status: ACTIVE | Noted: 2019-08-23

## 2019-09-17 RX ORDER — MELOXICAM 15 MG/1
TABLET ORAL
COMMUNITY
Start: 2019-07-29 | End: 2019-11-13 | Stop reason: HOSPADM

## 2019-09-17 RX ORDER — SACCHAROMYCES BOULARDII 250 MG
250 CAPSULE ORAL
COMMUNITY
Start: 2019-08-24 | End: 2019-09-23

## 2019-09-18 ENCOUNTER — OFFICE VISIT (OUTPATIENT)
Dept: SURGERY | Facility: CLINIC | Age: 74
End: 2019-09-18

## 2019-09-18 VITALS
HEIGHT: 62 IN | WEIGHT: 169.9 LBS | HEART RATE: 72 BPM | TEMPERATURE: 99.1 F | DIASTOLIC BLOOD PRESSURE: 90 MMHG | BODY MASS INDEX: 31.27 KG/M2 | OXYGEN SATURATION: 96 % | SYSTOLIC BLOOD PRESSURE: 140 MMHG

## 2019-09-18 DIAGNOSIS — K57.92 DIVERTICULITIS: Primary | ICD-10-CM

## 2019-09-18 DIAGNOSIS — Z86.010 HISTORY OF COLON POLYPS: ICD-10-CM

## 2019-09-18 PROCEDURE — 99204 OFFICE O/P NEW MOD 45 MIN: CPT | Performed by: COLON & RECTAL SURGERY

## 2019-09-18 RX ORDER — SODIUM CHLORIDE, SODIUM LACTATE, POTASSIUM CHLORIDE, CALCIUM CHLORIDE 600; 310; 30; 20 MG/100ML; MG/100ML; MG/100ML; MG/100ML
30 INJECTION, SOLUTION INTRAVENOUS CONTINUOUS
Status: CANCELLED | OUTPATIENT
Start: 2019-11-13

## 2019-09-18 NOTE — PROGRESS NOTES
"Olivia Addison is a 74 y.o. female who is seen as a consult at the request of Danuta Lynne, * for recurrent diverticulitis    HPI:    Pt c/o 2 recent episodes diverticulitis  She always has low abdominal pain, which worsens if she stands.  Pain radiates to her left side and back.  If severe, she has difficulty walking  She did not get f/c this episode, and she does not think she has had a fever in the past  She has had a change in her bowel movements only once she is started on abx  No blood per rectum recently, possibly once in the past with diverticulitis    She took cipro and flagyl, which usually work, but did not work for her this time in August 2019.  She had to go back to the hospital 2 September.  She has given IV abx and po augmentin to go home, and she feels that her symptoms have \"pretty much cleared up\"  She has had 5 episodes of diverticulitis in the past    She is not on any anticoags.  She states she has never been discharged home on anticoags  She is s/p repair of mitral valve and tricuspid valve 2012  She has a Pacemaker, which has been replaced 3 times    She has occasional gross hematuria, and was previously evaluated by urology    Most recent colonoscopy 2014 Dr. 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 [employer]     Past Medical History:   Diagnosis Date   • ADHD (attention deficit hyperactivity disorder)    • Adjustment disorder with anxiety    • Allergic rhinitis    • Arthritis    • Atherosclerosis    • Atrial fibrillation (CMS/HCC)    • Atrophic vaginitis    • Blepharitis of both eyes    • CAD (coronary artery disease)    • Cardiomyopathy (CMS/HCC)    • Cataract    • CHF (congestive heart failure) (CMS/HCC)     CHRONIC SYSTOLIC   • DDD (degenerative disc disease), cervical    • Depression    • Diaphoresis 05/23/2019    SEEN AT [facility]   • " Diverticulitis 09/02/2019    SEEN AT [facility] ER   • Diverticulitis 11/15/2016   • Diverticulitis 09/07/2014   • Diverticulitis large intestine 05/23/2017   • Diverticulitis of colon 08/22/2019    ADMITTED TO [facility]   • Dry eye syndrome    • Dyspnea    • Dysuria 06/2019   • Esophageal injury     ESTRELLITA-RAMOS SYNDROME   • Fatigue    • Hematuria 08/2019   • History of alcohol abuse    • Hypercholesterolemia    • Hyperlipidemia    • Hypertension    • Kidney cysts 07/2017    FOLLOWED BY DR. RITU AMOS   • LBBB (left bundle branch block)    • Estrellita-Ramos syndrome    • Memory loss    • NSVT (nonsustained ventricular tachycardia) (CMS/HCC)    • AMADO (obstructive sleep apnea)     USES MOUTHGUARDS   • Patent foramen ovale    • Pneumonia 05/25/2017   • Presbyopia    • PTSD (post-traumatic stress disorder)    • Reactive airway disease    • Regular astigmatism    • RLS (restless legs syndrome)    • S/P TVR (tricuspid valve repair)    • Seborrheic keratosis     FOLLOWED BY DR. HARMAN CÁRDENAS   • Segmental and somatic dysfunction of cervical region    • Segmental and somatic dysfunction of thoracic region    • Vitamin D deficiency    • VT (ventricular tachycardia) (CMS/HCC)        Past Surgical History:   Procedure Laterality Date   • APPENDECTOMY N/A    • BIVENTRICULLAR IMPLANTABLE CARDIOVERTER DEFIBRILLATOR PLACEMENT N/A 04/10/2012    [employer], BI-V, DR. MICKEY MORALES AT [facility]   • BLADDER SUSPENSION N/A    • BREAST CYST ASPIRATION     • BREAST SURGERY Bilateral     REDUCTION MAMMAPLASTY   • CARDIAC CATHETERIZATION Bilateral 03/19/2012    Normal coronary arteries, severe mitral regurgitation and aortic regurgitation. EF was about 30-35%.; DR. JYOTI ROMERO AT [facility]   • CARDIAC ELECTROPHYSIOLOGY PROCEDURE Left 7/11/2016    Procedure: PPM battery change [employer]TR[employer];  Surgeon: Hunter Faust MD;  Location: [facility] INVASIVE LOCATION;  Service:    • CATARACT EXTRACTION     • COLONOSCOPY N/A 10/14/2014    PANDIVERTICULOSIS IN  ENTIRE COLON, DR. BRANDEN RUIZ AT [facility]   • CORONARY ARTERY BYPASS GRAFT N/A    • ENDOSCOPY N/A 06/06/2012    CHAD-RAMOS TEAR WITH ACTIVE BLEEDINGBLOOD THROUGHOUT STOMACH, BLOOD IN DUODENUM, DR. TARUN GILLIS AT [facility]   • HYSTERECTOMY N/A 1980    OLIVIA WITH BSO   • IMPLANTABLE CARDIOVERTER DEFIBRILLATOR LEAD REPLACEMENT/POCKET REVISION N/A 08/28/2012    DR. GISELLE BOOGIE AT [facility]   • MITRAL VALVE REPAIR/REPLACEMENT N/A 06/06/2012    REMODELING  ANULOPLASTY USING 24 MM BARAJAS ANULOPLASTY RING, MODEL 5200, SERIAL # [id number], DR. VINCE ZAMUDIO AT [facility]   • PATENT FORAMEN OVALE CLOSURE N/A 06/06/2012    DR. VINCE ZAMUDIO AT [facility]   • TONSILLECTOMY Bilateral    • TRICUSPID VALVE SURGERY N/A 06/06/2012    REMODELING ANULOPLASTY USING 26 MM BARAJAS ANULOPLASTY RING, MODEL 6200, SERIAL # [id number], DR. VINCE ZAMUDIO AT [facility]   • VAGINAL DELIVERY N/A 1974       Social History:   reports that she quit smoking about 47 years ago. Her smoking use included cigarettes. She smoked 0.25 packs per day. She has never used smokeless tobacco. She reports that she drinks about 4.2 oz of alcohol per week. She reports that she does not use drugs.      Marriage status:     Family History   Problem Relation Age of Onset   • Diabetes Mother    • Hypertension Mother    • Emphysema Father    • Anxiety disorder Father    • Depression Father    • Hypertension Father    • Stroke Sister    • No Known Problems Brother    • Heart attack Maternal Grandmother    • Sleep apnea Maternal Grandmother    • No Known Problems Maternal Grandfather    • No Known Problems Paternal Grandmother    • No Known Problems Paternal Grandfather    • No Known Problems Brother    • No Known Problems Daughter          Current Outpatient Medications:   •  acyclovir (ZOVIRAX) 400 MG tablet, Take 400 mg by mouth 2 (two) times a day. Take no more than 5 doses a day., Disp: , Rfl:   •  atorvastatin (LIPITOR) 40 MG tablet, Take 40 mg by mouth daily., Disp: , Rfl:   •   buPROPion XL (WELLBUTRIN XL) 300 MG 24 hr tablet, Take 300 mg by mouth Daily., Disp: , Rfl:   •  carvedilol (COREG) 25 MG tablet, Take 25 mg by mouth 2 (two) times a day., Disp: , Rfl:   •  CONTRAVE 8-90 MG tablet, PLEASE SEE ATTACHED FOR DETAILED DIRECTIONS, Disp: , Rfl: 2  •  furosemide (LASIX) 40 MG tablet, TAKE ONE TABLET BY MOUTH DAILY, Disp: 90 tablet, Rfl: 2  •  ibuprofen (ADVIL,MOTRIN) 600 MG tablet, Take 1 tablet by mouth Every 6 (Six) Hours As Needed for Mild Pain ., Disp: 20 tablet, Rfl: 0  •  losartan (COZAAR) 50 MG tablet, Take 50 mg by mouth Daily., Disp: , Rfl:   •  meloxicam (MOBIC) 15 MG tablet, , Disp: , Rfl:   •  ondansetron (ZOFRAN) 4 MG tablet, Take 1 tablet by mouth Every 6 (Six) Hours As Needed for Nausea or Vomiting., Disp: 10 tablet, Rfl: 0  •  potassium chloride (K-DUR,KLOR-CON) 20 MEQ CR tablet, TAKE ONE TABLET BY MOUTH DAILY, Disp: 90 tablet, Rfl: 2  •  saccharomyces boulardii (FLORASTOR) 250 MG capsule, Take 250 mg by mouth., Disp: , Rfl:   •  sertraline (ZOLOFT) 100 MG tablet, Take 100 mg by mouth Daily., Disp: , Rfl: 2    Allergy  Sulfa antibiotics    Review of Systems   Constitution: Negative for decreased appetite, weakness and weight gain.   HENT: Negative for congestion, hearing loss and hoarse voice.    Eyes: Negative for blurred vision, discharge and visual disturbance.   Cardiovascular: Negative for chest pain, cyanosis and leg swelling.   Respiratory: Positive for shortness of breath and sleep disturbances due to breathing. Negative for cough and snoring.    Endocrine: Negative for cold intolerance and heat intolerance.   Hematologic/Lymphatic: Does not bruise/bleed easily.   Skin: Negative for itching, poor wound healing and skin cancer.   Musculoskeletal: Positive for arthritis and joint pain. Negative for back pain and joint swelling.   Gastrointestinal: Positive for change in bowel habit. Negative for abdominal pain, bowel incontinence and constipation.   Genitourinary:  Positive for hematuria. Negative for bladder incontinence and dysuria.   Neurological: Negative for brief paralysis, excessive daytime sleepiness, dizziness, focal weakness, headaches and light-headedness.   Psychiatric/Behavioral: Negative for altered mental status and hallucinations. The patient does not have insomnia.    Allergic/Immunologic: Negative for HIV exposure and persistent infections.       Vitals:    09/18/19 1522   BP: 140/90   Pulse: 72   Temp: 99.1 °F (37.3 °C)   SpO2: 96%     Body mass index is 31.08 kg/m².    Physical Exam   Constitutional: She is oriented to person, place, and time. She appears well-developed and well-nourished. No distress.   HENT:   Head: Normocephalic and atraumatic.   Nose: Nose normal.   Mouth/Throat: Oropharynx is clear and moist.   Eyes: Conjunctivae and EOM are normal. Pupils are equal, round, and reactive to light.   Neck: Normal range of motion. No tracheal deviation present.   Pulmonary/Chest: Effort normal and breath sounds normal. No respiratory distress.   Abdominal: Soft. She exhibits no distension.   Musculoskeletal: Normal range of motion. She exhibits no edema or deformity.   Neurological: She is alert and oriented to person, place, and time. No cranial nerve deficit. Coordination and gait normal.   Skin: Skin is warm and dry.   Psychiatric: She has a normal mood and affect. Her behavior is normal. Judgment normal.       Review of Medical Record:  I reviewed CT images 9/2/2019: proximal sigmoid diverticulitis, no perforation or abscess    I reviewed records colonoscopy 2014 Dr. Corcoran: pandiverticulosis, no polyps    Assessment:  1. Diverticulitis        Plan:    To evaluate recurrent diverticulitis, I recommend doing a colonoscopy in approximately 4-6 weeks.  I described the patient risks, benefits, and alternatives, and she wishes to proceed.  I discussed with patient that she meets the current criteria to do an elective sigmoid resection.  She has had  recurrent diverticulitis in a short period of time.  She is not quite sure about surgery at this time.    Discussed if she were to have more frequent or complicated episodes, I would definitely recommend  laparoscopic sigmoid colon resection.  I discussed with them typical surgical time, hospital recovery, and home recovery.   I described to the patient risks, benefits, and alternatives. I discussed risks of surgery being heart attack, stroke, exacerbation of chronic medical illness, pneumonia, DVT, PTE, infection, bleeding, and injury to other organs during surgery. Patient expresses understanding.      Scribed for Mica Edwards MD by Sabrina Dorsey PA-C  9/18/2019   This patient was evaluated by me, recommendations made, documentation reviewed, edited, and revised by me, Mica Edwards MD

## 2019-09-23 ENCOUNTER — TELEPHONE (OUTPATIENT)
Dept: SURGERY | Facility: CLINIC | Age: 74
End: 2019-09-23

## 2019-09-23 ENCOUNTER — HOSPITAL ENCOUNTER (OUTPATIENT)
Dept: CT IMAGING | Facility: HOSPITAL | Age: 74
Discharge: HOME | End: 2019-09-23
Admitting: COLON & RECTAL SURGERY

## 2019-09-23 DIAGNOSIS — K57.92 DIVERTICULITIS: Primary | ICD-10-CM

## 2019-09-23 DIAGNOSIS — K57.92 DIVERTICULITIS: ICD-10-CM

## 2019-09-23 LAB — CREAT BLDA-MCNC: 0.7 MG/DL (ref 0.6–1.3)

## 2019-09-23 PROCEDURE — 74177 CT ABD & PELVIS W/CONTRAST: CPT

## 2019-09-23 PROCEDURE — 82565 ASSAY OF CREATININE: CPT

## 2019-09-23 PROCEDURE — 25010000002 IOPAMIDOL 61 % SOLUTION: Performed by: COLON & RECTAL SURGERY

## 2019-09-23 RX ORDER — AMOXICILLIN AND CLAVULANATE POTASSIUM 875; 125 MG/1; MG/1
1 TABLET, FILM COATED ORAL 2 TIMES DAILY
Qty: 28 TABLET | Refills: 0 | Status: SHIPPED | OUTPATIENT
Start: 2019-09-23 | End: 2020-01-10

## 2019-09-23 RX ADMIN — IOPAMIDOL 85 ML: 612 INJECTION, SOLUTION INTRAVENOUS at 15:36

## 2019-09-23 NOTE — TELEPHONE ENCOUNTER
Pt called stating the pain you and her had spoke of to call in right away about is back. Said you could call her directly if you needed to.

## 2019-09-23 NOTE — NURSING NOTE
Dr. Kenney called and stated there was nothing acute on pt CT abdomen/pelvis.  He asked for nurse to please call Dr. Edwards for further instructions.  Dr. Edwards paged.  Dr. Sheriff returned call and stated pt can go home and Dr. Edwards will follow-up with her in the morning about the specific results.  Pt aware. IV removed and pt left.  NAD

## 2019-09-24 ENCOUNTER — TELEPHONE (OUTPATIENT)
Dept: SURGERY | Facility: CLINIC | Age: 74
End: 2019-09-24

## 2019-09-24 NOTE — TELEPHONE ENCOUNTER
Pt did get your message. She is concerned about the ABX because she just finished Augmentin 1-1.5 weeks ago. Also had a round of Cipro, flagyl, and was hospitalized with IV ABX. Concerned that Augmentin won't help.

## 2019-10-29 ENCOUNTER — OFFICE VISIT (OUTPATIENT)
Dept: ORTHOPEDIC SURGERY | Facility: CLINIC | Age: 74
End: 2019-10-29

## 2019-10-29 VITALS — TEMPERATURE: 98.3 F | BODY MASS INDEX: 30.73 KG/M2 | HEIGHT: 62 IN | WEIGHT: 167 LBS

## 2019-10-29 DIAGNOSIS — M25.562 PAIN IN BOTH KNEES, UNSPECIFIED CHRONICITY: ICD-10-CM

## 2019-10-29 DIAGNOSIS — M25.561 PAIN IN BOTH KNEES, UNSPECIFIED CHRONICITY: ICD-10-CM

## 2019-10-29 DIAGNOSIS — M25.561 RIGHT KNEE PAIN, UNSPECIFIED CHRONICITY: Primary | ICD-10-CM

## 2019-10-29 PROCEDURE — 73562 X-RAY EXAM OF KNEE 3: CPT | Performed by: NURSE PRACTITIONER

## 2019-10-29 PROCEDURE — 20610 DRAIN/INJ JOINT/BURSA W/O US: CPT | Performed by: NURSE PRACTITIONER

## 2019-10-29 PROCEDURE — 99203 OFFICE O/P NEW LOW 30 MIN: CPT | Performed by: NURSE PRACTITIONER

## 2019-10-29 RX ORDER — FLUCONAZOLE 150 MG/1
150 TABLET ORAL
COMMUNITY
Start: 2019-10-02 | End: 2020-01-10

## 2019-10-29 RX ORDER — METHYLPREDNISOLONE ACETATE 80 MG/ML
80 INJECTION, SUSPENSION INTRA-ARTICULAR; INTRALESIONAL; INTRAMUSCULAR; SOFT TISSUE
Status: COMPLETED | OUTPATIENT
Start: 2019-10-29 | End: 2019-10-29

## 2019-10-29 RX ADMIN — METHYLPREDNISOLONE ACETATE 80 MG: 80 INJECTION, SUSPENSION INTRA-ARTICULAR; INTRALESIONAL; INTRAMUSCULAR; SOFT TISSUE at 09:51

## 2019-10-29 NOTE — PROGRESS NOTES
Patient: Olivia Addison  YOB: 1945 74 y.o. female  Medical Record Number: [id number]    Chief Complaints:   Chief Complaint   Patient presents with   • Right Knee - Pain   • Left Knee - Pain   • Establish Care       History of Present Illness:Olivia Addison is a 74 y.o. female who presents as a new patient both myself as well as to the practice with complaints of bilateral knee pain right greater than left.  She reports she has had pain off and on for many years, they have progressively gotten worse.  Describes the knee pain as a severe intermittent crushing aching type pain with clicking swelling, worse with going up and down stairs and walking, better with rest.    Allergies:   Allergies   Allergen Reactions   • Sulfa Antibiotics        Medications:   Current Outpatient Medications   Medication Sig Dispense Refill   • acyclovir (ZOVIRAX) 400 MG tablet Take 400 mg by mouth 2 (two) times a day. Take no more than 5 doses a day.     • amoxicillin-clavulanate (AUGMENTIN) 875-125 MG per tablet Take 1 tablet by mouth 2 (Two) Times a Day. 28 tablet 0   • atorvastatin (LIPITOR) 40 MG tablet Take 40 mg by mouth daily.     • buPROPion XL (WELLBUTRIN XL) 300 MG 24 hr tablet Take 300 mg by mouth Daily.     • carvedilol (COREG) 25 MG tablet Take 25 mg by mouth 2 (two) times a day.     • CONTRAVE 8-90 MG tablet PLEASE SEE ATTACHED FOR DETAILED DIRECTIONS  2   • fluconazole (DIFLUCAN) 150 MG tablet Take 150 mg by mouth Every Other Day.     • furosemide (LASIX) 40 MG tablet TAKE ONE TABLET BY MOUTH DAILY 90 tablet 2   • ibuprofen (ADVIL,MOTRIN) 600 MG tablet Take 1 tablet by mouth Every 6 (Six) Hours As Needed for Mild Pain . 20 tablet 0   • losartan (COZAAR) 50 MG tablet Take 50 mg by mouth Daily.     • meloxicam (MOBIC) 15 MG tablet      • ondansetron (ZOFRAN) 4 MG tablet Take 1 tablet by mouth Every 6 (Six) Hours As Needed for Nausea or Vomiting. 10 tablet 0   • potassium chloride (K-DUR,KLOR-CON) 20 MEQ CR  "tablet TAKE ONE TABLET BY MOUTH DAILY 90 tablet 2   • sertraline (ZOLOFT) 100 MG tablet Take 100 mg by mouth Daily.  2     No current facility-administered medications for this visit.          The following portions of the patient's history were reviewed and updated as appropriate: allergies, current medications, past family history, past medical history, past social history, past surgical history and problem list.    Review of Systems:   A 14 point review of systems was performed. All systems negative except pertinent positives/negative listed in HPI above    Physical Exam:   Vitals:    10/29/19 0911   Temp: 98.3 °F (36.8 °C)   Weight: 75.8 kg (167 lb)   Height: 157.5 cm (62\")       General: A and O x 3, ASA, NAD    SCLERA:    Normal    DENTITION:   Normal  Skin clear no unusual lesions noted  Bilateral knees patient has no appreciable effusion, 120 degrees flexion neutral and extension with significant patellofemoral crepitus noted.  Negative Lockman negative Marta calf soft nontender    Radiology:  Xrays 3views (ap,lateral, sunrise) bilateral knees were ordered and reviewed today secondary to pain show bone-on-bone end-stage osteoarthritis, particularly patellofemoral compartments.  No compared to views available    Assessment/Plan:  End-stage osteoarthritis bilateral knees    Patient I discussed treatment options.  She would like to proceed with bilateral knee cortisone injections.  Prior to injections risks were discussed including pain and infection.  Patient verbalized understanding would like to proceed with injections.  She was referred to outpatient physical therapy, we discussed the importance of weight loss set a 10 pound weight loss goal at the minimum.  Patient will ask given information on Monovisc will let us know that something she is interested in within the next 2 to 3 months and otherwise we will see her back as needed    Large Joint Arthrocentesis: R knee  Date/Time: 10/29/2019 9:51 " AM  Consent given by: patient  Site marked: site marked  Timeout: Immediately prior to procedure a time out was called to verify the correct patient, procedure, equipment, support staff and site/side marked as required   Supporting Documentation  Indications: pain and joint swelling   Procedure Details  Location: knee - R knee  Preparation: Patient was prepped and draped in the usual sterile fashion  Needle gauge: 21.  Approach: anterolateral  Medications administered: 80 mg methylPREDNISolone acetate 80 MG/ML; 2 mL lidocaine (cardiac)  Patient tolerance: patient tolerated the procedure well with no immediate complications    Large Joint Arthrocentesis: L knee  Date/Time: 10/29/2019 9:51 AM  Consent given by: patient  Site marked: site marked  Timeout: Immediately prior to procedure a time out was called to verify the correct patient, procedure, equipment, support staff and site/side marked as required   Supporting Documentation  Indications: pain and joint swelling   Procedure Details  Location: knee - L knee  Preparation: Patient was prepped and draped in the usual sterile fashion  Needle gauge: 21.  Approach: anterolateral  Medications administered: 2 mL lidocaine (cardiac); 80 mg methylPREDNISolone acetate 80 MG/ML  Patient tolerance: patient tolerated the procedure well with no immediate complications

## 2019-11-06 ENCOUNTER — CLINICAL SUPPORT NO REQUIREMENTS (OUTPATIENT)
Dept: CARDIOLOGY | Facility: CLINIC | Age: 74
End: 2019-11-06

## 2019-11-06 DIAGNOSIS — I50.22 CHRONIC SYSTOLIC CONGESTIVE HEART FAILURE (HCC): Primary | ICD-10-CM

## 2019-11-06 PROCEDURE — 93297 REM INTERROG DEV EVAL ICPMS: CPT | Performed by: INTERNAL MEDICINE

## 2019-11-06 PROCEDURE — 93295 DEV INTERROG REMOTE 1/2/MLT: CPT | Performed by: INTERNAL MEDICINE

## 2019-11-06 PROCEDURE — 93296 REM INTERROG EVL PM/IDS: CPT | Performed by: INTERNAL MEDICINE

## 2019-11-13 ENCOUNTER — ANESTHESIA (OUTPATIENT)
Dept: GASTROENTEROLOGY | Facility: HOSPITAL | Age: 74
End: 2019-11-13

## 2019-11-13 ENCOUNTER — ANESTHESIA EVENT (OUTPATIENT)
Dept: GASTROENTEROLOGY | Facility: HOSPITAL | Age: 74
End: 2019-11-13

## 2019-11-13 ENCOUNTER — HOSPITAL ENCOUNTER (OUTPATIENT)
Facility: HOSPITAL | Age: 74
Setting detail: HOSPITAL OUTPATIENT SURGERY
Discharge: HOME | End: 2019-11-13
Attending: COLON & RECTAL SURGERY | Admitting: COLON & RECTAL SURGERY

## 2019-11-13 VITALS
BODY MASS INDEX: 30.61 KG/M2 | HEIGHT: 62 IN | RESPIRATION RATE: 16 BRPM | OXYGEN SATURATION: 96 % | TEMPERATURE: 97.4 F | DIASTOLIC BLOOD PRESSURE: 85 MMHG | SYSTOLIC BLOOD PRESSURE: 128 MMHG | WEIGHT: 166.31 LBS | HEART RATE: 91 BPM

## 2019-11-13 DIAGNOSIS — K57.92 DIVERTICULITIS: ICD-10-CM

## 2019-11-13 PROCEDURE — 45378 DIAGNOSTIC COLONOSCOPY: CPT | Performed by: COLON & RECTAL SURGERY

## 2019-11-13 PROCEDURE — 25010000002 PROPOFOL 10 MG/ML EMULSION: Performed by: ANESTHESIOLOGY

## 2019-11-13 RX ORDER — PROPOFOL 10 MG/ML
VIAL (ML) INTRAVENOUS CONTINUOUS PRN
Status: DISCONTINUED | OUTPATIENT
Start: 2019-11-13 | End: 2019-11-13 | Stop reason: SURG

## 2019-11-13 RX ORDER — LIDOCAINE HYDROCHLORIDE 20 MG/ML
INJECTION, SOLUTION INFILTRATION; PERINEURAL AS NEEDED
Status: DISCONTINUED | OUTPATIENT
Start: 2019-11-13 | End: 2019-11-13 | Stop reason: SURG

## 2019-11-13 RX ORDER — PROPOFOL 10 MG/ML
VIAL (ML) INTRAVENOUS AS NEEDED
Status: DISCONTINUED | OUTPATIENT
Start: 2019-11-13 | End: 2019-11-13 | Stop reason: SURG

## 2019-11-13 RX ORDER — SODIUM CHLORIDE, SODIUM LACTATE, POTASSIUM CHLORIDE, CALCIUM CHLORIDE 600; 310; 30; 20 MG/100ML; MG/100ML; MG/100ML; MG/100ML
30 INJECTION, SOLUTION INTRAVENOUS CONTINUOUS
Status: DISCONTINUED | OUTPATIENT
Start: 2019-11-13 | End: 2019-11-13 | Stop reason: HOSPADM

## 2019-11-13 RX ADMIN — PROPOFOL 100 MG: 10 INJECTION, EMULSION INTRAVENOUS at 09:36

## 2019-11-13 RX ADMIN — SODIUM CHLORIDE, POTASSIUM CHLORIDE, SODIUM LACTATE AND CALCIUM CHLORIDE 30 ML/HR: 600; 310; 30; 20 INJECTION, SOLUTION INTRAVENOUS at 09:31

## 2019-11-13 RX ADMIN — LIDOCAINE HYDROCHLORIDE 60 MG: 20 INJECTION, SOLUTION INFILTRATION; PERINEURAL at 09:36

## 2019-11-13 RX ADMIN — PROPOFOL 100 MCG/KG/MIN: 10 INJECTION, EMULSION INTRAVENOUS at 09:35

## 2019-11-13 NOTE — ANESTHESIA PREPROCEDURE EVALUATION
Anesthesia Evaluation     Patient summary reviewed and Nursing notes reviewed                Airway   Mallampati: I  TM distance: >3 FB  Neck ROM: full  No difficulty expected  Dental - normal exam     Pulmonary - normal exam   (+) pneumonia , shortness of breath, sleep apnea,   Cardiovascular - normal exam    (+) pacemaker ICD, hypertension, valvular problems/murmurs, CAD, CABG, dysrhythmias, CHF , hyperlipidemia,       Neuro/Psych  (+) psychiatric history,     GI/Hepatic/Renal/Endo    (+) obesity,  GI bleeding , renal disease,   (-) morbid obesity    Musculoskeletal     Abdominal  - normal exam    Bowel sounds: normal.   Substance History   (+) alcohol use,      OB/GYN negative ob/gyn ROS         Other   arthritis,                      Anesthesia Plan    ASA 3     MAC       Anesthetic plan, all risks, benefits, and alternatives have been provided, discussed and informed consent has been obtained with: patient.

## 2019-11-13 NOTE — ANESTHESIA POSTPROCEDURE EVALUATION
"Patient: Olivia Addison    Procedure Summary     Date:  11/13/19 Room / Location:   [facility]    Anesthesia Start:  0934 Anesthesia Stop:  0956    Procedure:  COLONOSCOPY to cecum (N/A ) Diagnosis:       Diverticulitis      (Diverticulitis [K57.92])    Surgeon:  Mica Edwards MD Provider:  Artemio Hernandez MD    Anesthesia Type:  MAC ASA Status:  3          Anesthesia Type: MAC  Last vitals  BP   128/85 (11/13/19 1016)   Temp   36.3 °C (97.4 °F) (11/13/19 0852)   Pulse   91 (11/13/19 1016)   Resp   16 (11/13/19 1016)     SpO2   96 % (11/13/19 1016)     Post Anesthesia Care and Evaluation    Patient location during evaluation: PACU  Patient participation: complete - patient participated  Level of consciousness: awake  Pain score: 0  Pain management: adequate  Airway patency: patent  Anesthetic complications: No anesthetic complications  PONV Status: none  Cardiovascular status: acceptable  Respiratory status: acceptable  Hydration status: acceptable    Comments: /85 (BP Location: Left arm, Patient Position: Sitting)   Pulse 91   Temp 36.3 °C (97.4 °F) (Oral)   Resp 16   Ht 157.5 cm (62\")   Wt 75.4 kg (166 lb 5 oz)   SpO2 96%   BMI 30.42 kg/m²       "

## 2019-11-13 NOTE — DISCHARGE INSTRUCTIONS
Colonoscopy, Adult, Care After  This sheet gives you information about how to care for yourself after your procedure. Your doctor may also give you more specific instructions. If you have problems or questions, call your doctor.  What can I expect after the procedure?  After the procedure, it is common to have:  · A small amount of blood in your poop for 24 hours.  · Some gas.  · Mild cramping or bloating in your belly.  Follow these instructions at home:  General instructions  · For the first 24 hours after the procedure:  ? Do not drive or use machinery.  ? Do not sign important documents.  ? Do not drink alcohol.  ? Do your daily activities more slowly than normal.  ? Eat foods that are soft and easy to digest.  · Take over-the-counter or prescription medicines only as told by your doctor.  To help cramping and bloating:    · Try walking around.  · Put heat on your belly (abdomen) as told by your doctor. Use a heat source that your doctor recommends, such as a moist heat pack or a heating pad.  ? Put a towel between your skin and the heat source.  ? Leave the heat on for 20-30 minutes.  ? Remove the heat if your skin turns bright red. This is especially important if you cannot feel pain, heat, or cold. You can get burned.  Eating and drinking    · Drink enough fluid to keep your pee (urine) clear or pale yellow.  · Return to your normal diet as told by your doctor. Avoid heavy or fried foods that are hard to digest.  · Avoid drinking alcohol for as long as told by your doctor.  Contact a doctor if:  · You have blood in your poop (stool) 2-3 days after the procedure.  Get help right away if:  · You have more than a small amount of blood in your poop.  · You see large clumps of tissue (blood clots) in your poop.  · Your belly is swollen.  · You feel sick to your stomach (nauseous).  · You throw up (vomit).  · You have a fever.  · You have belly pain that gets worse, and medicine does not help your  pain.  Summary  · After the procedure, it is common to have a small amount of blood in your poop. You may also have mild cramping and bloating in your belly.  · For the first 24 hours after the procedure, do not drive or use machinery, do not sign important documents, and do not drink alcohol.  · Get help right away if you have a lot of blood in your poop, feel sick to your stomach, have a fever, or have more belly pain.  This information is not intended to replace advice given to you by your health care provider. Make sure you discuss any questions you have with your health care provider.  Document Released: 01/20/2012 Document Revised: 10/18/2018 Document Reviewed: 09/11/2017  [employer] Interactive Patient Education © 2019 [employer]  Diverticulosis    Diverticulosis is a condition that develops when small pouches (diverticula) form in the wall of the large intestine (colon). The colon is where water is absorbed and stool is formed. The pouches form when the inside layer of the colon pushes through weak spots in the outer layers of the colon. You may have a few pouches or many of them.  What are the causes?  The cause of this condition is not known.  What increases the risk?  The following factors may make you more likely to develop this condition:  · Being older than age 60. Your risk for this condition increases with age. Diverticulosis is rare among people younger than age 30. By age 80, many people have it.  · Eating a low-fiber diet.  · Having frequent constipation.  · Being overweight.  · Not getting enough exercise.  · Smoking.  · Taking over-the-counter pain medicines, like aspirin and ibuprofen.  · Having a family history of diverticulosis.  What are the signs or symptoms?  In most people, there are no symptoms of this condition. If you do have symptoms, they may include:  · Bloating.  · Cramps in the abdomen.  · Constipation or diarrhea.  · Pain in the lower left side of the abdomen.  How is this  diagnosed?  This condition is most often diagnosed during an exam for other colon problems. Because diverticulosis usually has no symptoms, it often cannot be diagnosed independently. This condition may be diagnosed by:  · Using a flexible scope to examine the colon (colonoscopy).  · Taking an X-ray of the colon after dye has been put into the colon (barium enema).  · Doing a CT scan.  How is this treated?  You may not need treatment for this condition if you have never developed an infection related to diverticulosis. If you have had an infection before, treatment may include:  · Eating a high-fiber diet. This may include eating more fruits, vegetables, and grains.  · Taking a fiber supplement.  · Taking a live bacteria supplement (probiotic).  · Taking medicine to relax your colon.  · Taking antibiotic medicines.  Follow these instructions at home:  · Drink 6-8 glasses of water or more each day to prevent constipation.  · Try not to strain when you have a bowel movement.  · If you have had an infection before:  ? Eat more fiber as directed by your health care provider or your diet and nutrition specialist (dietitian).  ? Take a fiber supplement or probiotic, if your health care provider approves.  · Take over-the-counter and prescription medicines only as told by your health care provider.  · If you were prescribed an antibiotic, take it as told by your health care provider. Do not stop taking the antibiotic even if you start to feel better.  · Keep all follow-up visits as told by your health care provider. This is important.  Contact a health care provider if:  · You have pain in your abdomen.  · You have bloating.  · You have cramps.  · You have not had a bowel movement in 3 days.  Get help right away if:  · Your pain gets worse.  · Your bloating becomes very bad.  · You have a fever or chills, and your symptoms suddenly get worse.  · You vomit.  · You have bowel movements that are bloody or black.  · You have  bleeding from your rectum.  Summary  · Diverticulosis is a condition that develops when small pouches (diverticula) form in the wall of the large intestine (colon).  · You may have a few pouches or many of them.  · This condition is most often diagnosed during an exam for other colon problems.  · If you have had an infection related to diverticulosis, treatment may include increasing the fiber in your diet, taking supplements, or taking medicines.  This information is not intended to replace advice given to you by your health care provider. Make sure you discuss any questions you have with your health care provider.  Document Released: 09/14/2005 Document Revised: 11/06/2017 Document Reviewed: 11/06/2017  [employer] Interactive Patient Education © 2019 [employer]

## 2019-11-13 NOTE — H&P
Olivia Addison is a 74 y.o. female  who is referred by Mica Edwards MD for a colonoscopy. She is an  has a history of f/u diverticulitis.     She denies any change in bowel function, melena, or hematochezia.    Past Medical History:   Diagnosis Date   • ADHD (attention deficit hyperactivity disorder)    • Adjustment disorder with anxiety    • Allergic rhinitis    • Arthritis    • Atherosclerosis    • Atrial fibrillation (CMS/HCC)    • Atrophic vaginitis    • Blepharitis of both eyes    • CAD (coronary artery disease)    • Cardiomyopathy (CMS/HCC)    • Cataract    • CHF (congestive heart failure) (CMS/HCC)     CHRONIC SYSTOLIC   • DDD (degenerative disc disease), cervical    • Depression    • Diaphoresis 05/23/2019    [facility]   • Diverticulitis 09/02/2019    [facility]   • Diverticulitis 11/15/2016   • Diverticulitis 09/07/2014   • Diverticulitis    • Diverticulitis large intestine 05/23/2017   • Diverticulitis of colon 08/22/2019    [facility]   • Dry eye syndrome    • Dyspnea    • Dysuria 06/2019   • Esophageal injury     ESTRELLITA-RAMOS SYNDROME   • Fatigue    • Hematuria 08/2019   • History of alcohol abuse    • Hypercholesterolemia    • Hyperlipidemia    • Hypertension    • Kidney cysts 07/2017    FOLLOWED BY DR. RITU AMOS   • LBBB (left bundle branch block)    • Estrellita-Ramos syndrome    • Memory loss    • NSVT (nonsustained ventricular tachycardia) (CMS/[facility])    • AMADO (obstructive sleep apnea)     USES MOUTHGUARDS   • Patent foramen ovale    • Pneumonia 05/25/2017   • Presbyopia    • PTSD (post-traumatic stress disorder)    • Reactive airway disease    • Regular astigmatism    • RLS (restless legs syndrome)    • S/P TVR (tricuspid valve repair)    • Seborrheic keratosis     FOLLOWED BY DR. HARMAN CÁRDENAS   • Segmental and somatic dysfunction of cervical region    • Segmental and somatic dysfunction of thoracic region    • Vitamin D deficiency    • VT (ventricular tachycardia) (CMS/HCC)         Past Surgical History:   Procedure Laterality Date   • APPENDECTOMY N/A    • BIVENTRICULLAR IMPLANTABLE CARDIOVERTER DEFIBRILLATOR PLACEMENT N/A 04/10/2012    [employer], BI-V, DR. MICKEY MORALES AT [facility]   • BLADDER SUSPENSION N/A    • BREAST CYST ASPIRATION     • BREAST SURGERY Bilateral     REDUCTION MAMMAPLASTY   • CARDIAC CATHETERIZATION Bilateral 03/19/2012    Normal coronary arteries, severe mitral regurgitation and aortic regurgitation. EF was about 30-35%.; DR. JYOTI ROMERO AT [facility]   • CARDIAC ELECTROPHYSIOLOGY PROCEDURE Left 7/11/2016    Procedure: PPM battery change [employer];  Surgeon: Hunter Faust MD;  Location:  [facility];  Service:    • CATARACT EXTRACTION     • COLONOSCOPY N/A 10/14/2014    PANDIVERTICULOSIS IN ENTIRE COLON, DR. BRANDEN RUIZ AT [facility]   • CORONARY ARTERY BYPASS GRAFT N/A    • ENDOSCOPY N/A 06/06/2012    CHAD-RAMOS TEAR WITH ACTIVE BLEEDINGBLOOD THROUGHOUT STOMACH, BLOOD IN DUODENUM, DR. TARUN GILLIS AT [facility]   • HYSTERECTOMY N/A 1980    OLIVIA WITH BSO   • IMPLANTABLE CARDIOVERTER DEFIBRILLATOR LEAD REPLACEMENT/POCKET REVISION N/A 08/28/2012    DR. GISELLE BOOGIE AT [facility]   • MITRAL VALVE REPAIR/REPLACEMENT N/A 06/06/2012    REMODELING  ANULOPLASTY USING 24 MM BARAJAS ANULOPLASTY RING, MODEL 5200, SERIAL # [id number], DR. VINCE ZAMUDIO AT [facility]   • PATENT FORAMEN OVALE CLOSURE N/A 06/06/2012    DR. VINCE ZAMUDIO AT [facility]   • TONSILLECTOMY Bilateral    • TRICUSPID VALVE SURGERY N/A 06/06/2012    REMODELING ANULOPLASTY USING 26 MM BARAJAS ANULOPLASTY RING, MODEL 6200, SERIAL # [id number], DR. VINCE ZAMUDIO AT [facility]   • VAGINAL DELIVERY N/A 1974       Medications Prior to Admission   Medication Sig Dispense Refill Last Dose   • acyclovir (ZOVIRAX) 400 MG tablet Take 400 mg by mouth 2 (two) times a day. Take no more than 5 doses a day.   11/12/2019 at Unknown time   • amoxicillin-clavulanate (AUGMENTIN) 875-125 MG per tablet Take 1 tablet by mouth 2 (Two)  Times a Day. 28 tablet 0 11/13/2019 at Unknown time   • atorvastatin (LIPITOR) 40 MG tablet Take 40 mg by mouth daily.   11/12/2019 at Unknown time   • buPROPion XL (WELLBUTRIN XL) 300 MG 24 hr tablet Take 300 mg by mouth Daily.   11/12/2019 at Unknown time   • carvedilol (COREG) 25 MG tablet Take 25 mg by mouth 2 (two) times a day.   11/12/2019 at Unknown time   • furosemide (LASIX) 40 MG tablet TAKE ONE TABLET BY MOUTH DAILY 90 tablet 2 11/12/2019 at Unknown time   • ibuprofen (ADVIL,MOTRIN) 600 MG tablet Take 1 tablet by mouth Every 6 (Six) Hours As Needed for Mild Pain . 20 tablet 0 Past Month at Unknown time   • losartan (COZAAR) 50 MG tablet Take 50 mg by mouth Daily.   11/13/2019 at Unknown time   • potassium chloride (K-DUR,KLOR-CON) 20 MEQ CR tablet TAKE ONE TABLET BY MOUTH DAILY 90 tablet 2 11/12/2019 at Unknown time   • sertraline (ZOLOFT) 100 MG tablet Take 100 mg by mouth Daily.  2 11/12/2019 at Unknown time   • CONTRAVE 8-90 MG tablet PLEASE SEE ATTACHED FOR DETAILED DIRECTIONS  2 Taking   • fluconazole (DIFLUCAN) 150 MG tablet Take 150 mg by mouth Every Other Day.   Unknown at Unknown time   • meloxicam (MOBIC) 15 MG tablet    Taking   • ondansetron (ZOFRAN) 4 MG tablet Take 1 tablet by mouth Every 6 (Six) Hours As Needed for Nausea or Vomiting. 10 tablet 0 Unknown at Unknown time       Allergies   Allergen Reactions   • Sulfa Antibiotics        Family History   Problem Relation Age of Onset   • Diabetes Mother    • Hypertension Mother    • Emphysema Father    • Anxiety disorder Father    • Depression Father    • Hypertension Father    • Stroke Sister    • No Known Problems Brother    • Heart attack Maternal Grandmother    • Sleep apnea Maternal Grandmother    • No Known Problems Maternal Grandfather    • No Known Problems Paternal Grandmother    • No Known Problems Paternal Grandfather    • No Known Problems Brother    • No Known Problems Daughter        Social History     Socioeconomic History   •  Marital status:      Spouse name: Not on file   • Number of children: Not on file   • Years of education: Not on file   • Highest education level: Not on file   Tobacco Use   • Smoking status: Former Smoker     Packs/day: 0.25     Types: Cigarettes     Last attempt to quit: 1972     Years since quittin.8   • Smokeless tobacco: Never Used   • Tobacco comment: caffine use   Substance and Sexual Activity   • Alcohol use: Yes     Alcohol/week: 4.2 oz     Types: 7 Glasses of wine per week     Comment: MODERATE AMT   • Drug use: No   • Sexual activity: Defer     Birth control/protection: Post-menopausal, Surgical       Review of Systems   Gastrointestinal: Negative for abdominal pain, nausea and vomiting.   All other systems reviewed and are negative.      Vitals:    19 0852   BP: 141/83   Pulse: 74   Resp: 16   Temp: 97.4 °F (36.3 °C)   SpO2: 96%         Physical Exam   Constitutional: She is oriented to person, place, and time. She appears well-developed and well-nourished.   HENT:   Head: Normocephalic and atraumatic.   Eyes: EOM are normal. Pupils are equal, round, and reactive to light.   Cardiovascular: Regular rhythm.   Pulmonary/Chest: Effort normal.   Abdominal: Soft. She exhibits no distension.   Musculoskeletal: Normal range of motion.   Neurological: She is alert and oriented to person, place, and time.   Skin: Skin is warm and dry.   Psychiatric: Judgment and thought content normal.         Assessment/Plan       f/u diverticulitis..         I recommend colonoscopy.  I described risks, benefits of the procedure with the patient including but not limited to bleeding, infection, possibility of perforation and possible polypectomy. All of the patient's questions were answered and they would like to proceed with the above recommendations.

## 2020-01-01 PROCEDURE — 93005 ELECTROCARDIOGRAM TRACING: CPT | Performed by: EMERGENCY MEDICINE

## 2020-01-01 PROCEDURE — 36415 COLL VENOUS BLD VENIPUNCTURE: CPT

## 2020-01-01 PROCEDURE — 93005 ELECTROCARDIOGRAM TRACING: CPT

## 2020-01-01 PROCEDURE — 93010 ELECTROCARDIOGRAM REPORT: CPT | Performed by: INTERNAL MEDICINE

## 2020-01-01 PROCEDURE — 99284 EMERGENCY DEPT VISIT MOD MDM: CPT

## 2020-01-01 PROCEDURE — 99285 EMERGENCY DEPT VISIT HI MDM: CPT

## 2020-01-02 ENCOUNTER — HOSPITAL ENCOUNTER (EMERGENCY)
Facility: HOSPITAL | Age: 75
Discharge: HOME | End: 2020-01-02
Attending: EMERGENCY MEDICINE | Admitting: EMERGENCY MEDICINE

## 2020-01-02 ENCOUNTER — APPOINTMENT (OUTPATIENT)
Dept: CT IMAGING | Facility: HOSPITAL | Age: 75
End: 2020-01-02

## 2020-01-02 ENCOUNTER — APPOINTMENT (OUTPATIENT)
Dept: GENERAL RADIOLOGY | Facility: HOSPITAL | Age: 75
End: 2020-01-02

## 2020-01-02 VITALS
DIASTOLIC BLOOD PRESSURE: 82 MMHG | OXYGEN SATURATION: 93 % | HEIGHT: 62 IN | HEART RATE: 69 BPM | TEMPERATURE: 98.4 F | WEIGHT: 183.4 LBS | SYSTOLIC BLOOD PRESSURE: 134 MMHG | RESPIRATION RATE: 16 BRPM | BODY MASS INDEX: 33.75 KG/M2

## 2020-01-02 DIAGNOSIS — R07.89 ATYPICAL CHEST PAIN: Primary | ICD-10-CM

## 2020-01-02 DIAGNOSIS — R51.9 LEFT TEMPORAL HEADACHE: ICD-10-CM

## 2020-01-02 LAB
ALBUMIN SERPL-MCNC: 4.1 G/DL (ref 3.5–5.2)
ALBUMIN/GLOB SERPL: 1.5 G/DL
ALP SERPL-CCNC: 84 U/L (ref 39–117)
ALT SERPL W P-5'-P-CCNC: 18 U/L (ref 1–33)
ANION GAP SERPL CALCULATED.3IONS-SCNC: 13.8 MMOL/L (ref 5–15)
AST SERPL-CCNC: 18 U/L (ref 1–32)
BACTERIA UR QL AUTO: NORMAL /HPF
BASOPHILS # BLD AUTO: 0.03 10*3/MM3 (ref 0–0.2)
BASOPHILS NFR BLD AUTO: 0.5 % (ref 0–1.5)
BILIRUB SERPL-MCNC: 0.2 MG/DL (ref 0.2–1.2)
BILIRUB UR QL STRIP: NEGATIVE
BUN BLD-MCNC: 22 MG/DL (ref 8–23)
BUN/CREAT SERPL: 21.6 (ref 7–25)
CALCIUM SPEC-SCNC: 8.8 MG/DL (ref 8.6–10.5)
CHLORIDE SERPL-SCNC: 104 MMOL/L (ref 98–107)
CLARITY UR: CLEAR
CO2 SERPL-SCNC: 24.2 MMOL/L (ref 22–29)
COLOR UR: YELLOW
CREAT BLD-MCNC: 1.02 MG/DL (ref 0.57–1)
DEPRECATED RDW RBC AUTO: 47.1 FL (ref 37–54)
EOSINOPHIL # BLD AUTO: 0.26 10*3/MM3 (ref 0–0.4)
EOSINOPHIL NFR BLD AUTO: 4.2 % (ref 0.3–6.2)
ERYTHROCYTE [DISTWIDTH] IN BLOOD BY AUTOMATED COUNT: 13.6 % (ref 12.3–15.4)
ERYTHROCYTE [SEDIMENTATION RATE] IN BLOOD: 10 MM/HR (ref 0–30)
GFR SERPL CREATININE-BSD FRML MDRD: 53 ML/MIN/1.73
GLOBULIN UR ELPH-MCNC: 2.8 GM/DL
GLUCOSE BLD-MCNC: 111 MG/DL (ref 65–99)
GLUCOSE UR STRIP-MCNC: NEGATIVE MG/DL
HCT VFR BLD AUTO: 35.8 % (ref 34–46.6)
HGB BLD-MCNC: 12.1 G/DL (ref 12–15.9)
HGB UR QL STRIP.AUTO: ABNORMAL
HYALINE CASTS UR QL AUTO: NORMAL /LPF
IMM GRANULOCYTES # BLD AUTO: 0.01 10*3/MM3 (ref 0–0.05)
IMM GRANULOCYTES NFR BLD AUTO: 0.2 % (ref 0–0.5)
INR PPP: 0.96 (ref 0.9–1.1)
KETONES UR QL STRIP: NEGATIVE
LEUKOCYTE ESTERASE UR QL STRIP.AUTO: ABNORMAL
LYMPHOCYTES # BLD AUTO: 1.62 10*3/MM3 (ref 0.7–3.1)
LYMPHOCYTES NFR BLD AUTO: 25.9 % (ref 19.6–45.3)
MCH RBC QN AUTO: 32.4 PG (ref 26.6–33)
MCHC RBC AUTO-ENTMCNC: 33.8 G/DL (ref 31.5–35.7)
MCV RBC AUTO: 95.7 FL (ref 79–97)
MONOCYTES # BLD AUTO: 0.69 10*3/MM3 (ref 0.1–0.9)
MONOCYTES NFR BLD AUTO: 11 % (ref 5–12)
NEUTROPHILS # BLD AUTO: 3.64 10*3/MM3 (ref 1.7–7)
NEUTROPHILS NFR BLD AUTO: 58.2 % (ref 42.7–76)
NITRITE UR QL STRIP: NEGATIVE
NRBC BLD AUTO-RTO: 0 /100 WBC (ref 0–0.2)
NT-PROBNP SERPL-MCNC: 1052 PG/ML (ref 5–900)
PH UR STRIP.AUTO: <=5 [PH] (ref 5–8)
PLATELET # BLD AUTO: 231 10*3/MM3 (ref 140–450)
PMV BLD AUTO: 9.4 FL (ref 6–12)
POTASSIUM BLD-SCNC: 3.6 MMOL/L (ref 3.5–5.2)
PROT SERPL-MCNC: 6.9 G/DL (ref 6–8.5)
PROT UR QL STRIP: NEGATIVE
PROTHROMBIN TIME: 12.5 SECONDS (ref 11.7–14.2)
RBC # BLD AUTO: 3.74 10*6/MM3 (ref 3.77–5.28)
RBC # UR: NORMAL /HPF
REF LAB TEST METHOD: NORMAL
SODIUM BLD-SCNC: 142 MMOL/L (ref 136–145)
SP GR UR STRIP: 1.01 (ref 1–1.03)
SQUAMOUS #/AREA URNS HPF: NORMAL /HPF
TROPONIN T SERPL-MCNC: <0.01 NG/ML (ref 0–0.03)
TROPONIN T SERPL-MCNC: <0.01 NG/ML (ref 0–0.03)
UROBILINOGEN UR QL STRIP: ABNORMAL
WBC NRBC COR # BLD: 6.25 10*3/MM3 (ref 3.4–10.8)
WBC UR QL AUTO: NORMAL /HPF

## 2020-01-02 PROCEDURE — 85610 PROTHROMBIN TIME: CPT | Performed by: EMERGENCY MEDICINE

## 2020-01-02 PROCEDURE — 70450 CT HEAD/BRAIN W/O DYE: CPT

## 2020-01-02 PROCEDURE — 84484 ASSAY OF TROPONIN QUANT: CPT | Performed by: EMERGENCY MEDICINE

## 2020-01-02 PROCEDURE — 85652 RBC SED RATE AUTOMATED: CPT | Performed by: EMERGENCY MEDICINE

## 2020-01-02 PROCEDURE — 71046 X-RAY EXAM CHEST 2 VIEWS: CPT

## 2020-01-02 PROCEDURE — 83880 ASSAY OF NATRIURETIC PEPTIDE: CPT | Performed by: EMERGENCY MEDICINE

## 2020-01-02 PROCEDURE — 85025 COMPLETE CBC W/AUTO DIFF WBC: CPT | Performed by: EMERGENCY MEDICINE

## 2020-01-02 PROCEDURE — 81001 URINALYSIS AUTO W/SCOPE: CPT | Performed by: EMERGENCY MEDICINE

## 2020-01-02 PROCEDURE — 80053 COMPREHEN METABOLIC PANEL: CPT | Performed by: EMERGENCY MEDICINE

## 2020-01-02 RX ORDER — TRAMADOL HYDROCHLORIDE 50 MG/1
50 TABLET ORAL EVERY 6 HOURS PRN
Qty: 15 TABLET | Refills: 0 | Status: SHIPPED | OUTPATIENT
Start: 2020-01-02 | End: 2020-05-22

## 2020-01-02 RX ORDER — SODIUM CHLORIDE 0.9 % (FLUSH) 0.9 %
10 SYRINGE (ML) INJECTION AS NEEDED
Status: DISCONTINUED | OUTPATIENT
Start: 2020-01-02 | End: 2020-01-02 | Stop reason: HOSPADM

## 2020-01-02 RX ORDER — OMEPRAZOLE 40 MG/1
40 CAPSULE, DELAYED RELEASE ORAL 2 TIMES DAILY
Qty: 40 CAPSULE | Refills: 0 | Status: SHIPPED | OUTPATIENT
Start: 2020-01-02 | End: 2020-05-22

## 2020-01-02 RX ADMIN — NITROGLYCERIN 1 INCH: 20 OINTMENT TOPICAL at 01:07

## 2020-01-02 NOTE — ED PROVIDER NOTES
" EMERGENCY DEPARTMENT ENCOUNTER    CHIEF COMPLAINT  Chief Complaint: CP  History given by: self  History limited by: nothing  Room Number: 13/13  PMD: Danuta Lynne MD  Cardiology: Dr. Bojorquez    HPI:  Pt is a 74 y.o. female who presents complaining of intermittent L-sided CP that radiates to the back which started three days ago. Pt also c/o SOA, diaphoresis, HA and visual disturbances. Pt denies nausea, diarrhea, constipation, dysuria, urinary frequency, productive cough and fever. The pt started developing episodic \"burning\", \"pressured\" CP to the L of the breast bone which radiates to the L mid back a few days ago. With each episode, she becomes SOA and diaphoretic. She also reports having a pain to the L temporal region which \"sends shooting\" pain to the L eye which started 3 days ago as well. The pt has spontaneous \"flashing light\" visual disturbances of the L eye. Due to sx's she decided to come to the ED for further evaluation. The pt reports having no sx's at this time and that they are not exertional. The pt states she has had no recent changes in activity, food intake or medications. Hx of CAD and HTN.    Duration:  3 days  Onset: gradual  Timing: intermittent  Location: L sided CP  Radiation: L mid back  Quality: \"pressure\", \"burning\"  Intensity/Severity: moderate  Progression: unchanged  Associated Symptoms: SOA, diaphoresis, HA and visual disturbances  Aggravating Factors: none stated  Alleviating Factors: none stated  Previous Episodes: Pt has hx of CAD and HTN  Treatment before arrival: none stated    PAST MEDICAL HISTORY  Active Ambulatory Problems     Diagnosis Date Noted   • H/O tricuspid valve repair 10/14/2016   • Abdominal pain 10/18/2017   • Abdominal pain, left lower quadrant 10/08/2014   • Acute conjunctivitis 06/13/2016   • Adjustment disorder with anxious mood 10/18/2017   • Alcohol abuse 10/18/2017   • Anxiety 10/18/2017   • Attention deficit disorder 10/18/2017   • " Biventricular implantable cardioverter-defibrillator in situ 06/18/2013   • Diverticulitis of colon 10/18/2017   • Diverticulosis of intestine 10/18/2017   • Flank pain 10/18/2017   • 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   • Hematuria 10/18/2017   • Hypercholesterolemia 10/18/2017   • Low back pain 10/18/2017   • AMADO (obstructive sleep apnea) 09/29/2016   • Posttraumatic stress disorder 10/18/2017   • PTSD (post-traumatic stress disorder) 11/19/2014   • Urinary tract infection 10/18/2017   • S/P MVR (mitral valve repair) 10/18/2017   • Valvular disease 11/08/2017   • HTN (hypertension) 02/16/2018   • CHF (congestive heart failure) (CMS/[facility]) 02/16/2018   • Diverticulitis 08/23/2019   • Class 1 obesity in adult 08/23/2019     Resolved Ambulatory Problems     Diagnosis Date Noted   • Atrial fibrillation (CMS/[facility]) 06/18/2013   • Cardiomyopathy (CMS/HCC) 06/18/2013     Past Medical History:   Diagnosis Date   • ADHD (attention deficit hyperactivity disorder)    • Adjustment disorder with anxiety    • Allergic rhinitis    • Arthritis    • Atherosclerosis    • Atrophic vaginitis    • Blepharitis of both eyes    • CAD (coronary artery disease)    • Cataract    • DDD (degenerative disc disease), cervical    • Depression    • Diaphoresis 05/23/2019   • Diverticulitis large intestine 05/23/2017   • Dry eye syndrome    • Dyspnea    • Dysuria 06/2019   • Esophageal injury    • Fatigue    • History of alcohol abuse    • Hyperlipidemia    • Hypertension    • Kidney cysts 07/2017   • LBBB (left bundle branch block)    • Etsrellita-Biswas syndrome    • Memory loss    • NSVT (nonsustained ventricular tachycardia) (CMS/HCC)    • Patent foramen ovale    • Pneumonia 05/25/2017   • Presbyopia    • Reactive airway disease    • Regular astigmatism    • RLS (restless legs syndrome)    • S/P TVR (tricuspid valve repair)    • Seborrheic keratosis    • Segmental and somatic dysfunction of cervical region    • Segmental and somatic dysfunction of  thoracic region    • Vitamin D deficiency    • VT (ventricular tachycardia) (CMS/HCC)        PAST SURGICAL HISTORY  Past Surgical History:   Procedure Laterality Date   • APPENDECTOMY N/A    • BIVENTRICULLAR IMPLANTABLE CARDIOVERTER DEFIBRILLATOR PLACEMENT N/A 04/10/2012    [employer], BI-V, DR. MICKEY MORALES AT [facility]   • BLADDER SUSPENSION N/A    • BREAST CYST ASPIRATION     • BREAST SURGERY Bilateral     REDUCTION MAMMAPLASTY   • CARDIAC CATHETERIZATION Bilateral 03/19/2012    Normal coronary arteries, severe mitral regurgitation and aortic regurgitation. EF was about 30-35%.; DR. JYOTI ROMERO AT [facility]   • CARDIAC ELECTROPHYSIOLOGY PROCEDURE Left 7/11/2016    Procedure: PPM battery change [employer]TR[employer];  Surgeon: Hunter Faust MD;  Location: [facility] INVASIVE LOCATION;  Service:    • CATARACT EXTRACTION     • COLONOSCOPY N/A 10/14/2014    PANDIVERTICULOSIS IN ENTIRE COLON, DR. BRANDEN RUIZ AT [facility]   • COLONOSCOPY N/A 11/13/2019    SCATTERED SMALL AND LARGE DIVERTICULA IN ENTIRE COLON, RESCOPE IN 10 YRS, DR. PERCY MUHAMMAD AT [facility]   • CORONARY ARTERY BYPASS GRAFT N/A    • ENDOSCOPY N/A 06/06/2012    CHAD-RAMOS TEAR WITH ACTIVE BLEEDINGBLOOD THROUGHOUT STOMACH, BLOOD IN DUODENUM, DR. TARUN GILLIS AT [facility]   • HYSTERECTOMY N/A 1980    [facility] WITH BSO   • IMPLANTABLE CARDIOVERTER DEFIBRILLATOR LEAD REPLACEMENT/POCKET REVISION N/A 08/28/2012    DR. GISELLE BOOGIE AT [facility]   • MITRAL VALVE REPAIR/REPLACEMENT N/A 06/06/2012    REMODELING  ANULOPLASTY USING 24 MM BARAJAS ANULOPLASTY RING, MODEL 5200, SERIAL # [id number], DR. VINCE ZAMUDIO AT [facility]   • PATENT FORAMEN OVALE CLOSURE N/A 06/06/2012    DR. VINCE ZAMUDIO AT [facility]   • TONSILLECTOMY Bilateral    • TRICUSPID VALVE SURGERY N/A 06/06/2012    REMODELING ANULOPLASTY USING 26 MM BARAJAS ANULOPLASTY RING, MODEL 6200, SERIAL # [id number], DR. VINCE ZAMUDIO AT [facility]   • VAGINAL DELIVERY N/A 1974       FAMILY HISTORY  Family History   Problem Relation Age of Onset   •  Diabetes Mother    • Hypertension Mother    • Emphysema Father    • Anxiety disorder Father    • Depression Father    • Hypertension Father    • Stroke Sister    • No Known Problems Brother    • Heart attack Maternal Grandmother    • Sleep apnea Maternal Grandmother    • No Known Problems Maternal Grandfather    • No Known Problems Paternal Grandmother    • No Known Problems Paternal Grandfather    • No Known Problems Brother    • No Known Problems Daughter        SOCIAL HISTORY  Social History     Socioeconomic History   • Marital status:      Spouse name: Not on file   • Number of children: Not on file   • Years of education: Not on file   • Highest education level: Not on file   Tobacco Use   • Smoking status: Former Smoker     Packs/day: 0.25     Types: Cigarettes     Last attempt to quit:      Years since quittin.0   • Smokeless tobacco: Never Used   • Tobacco comment: caffine use   Substance and Sexual Activity   • Alcohol use: Yes     Alcohol/week: 7.0 standard drinks     Types: 7 Glasses of wine per week     Comment: MODERATE AMT   • Drug use: No   • Sexual activity: Defer     Birth control/protection: Post-menopausal, Surgical       ALLERGIES  Sulfa antibiotics    REVIEW OF SYSTEMS  Review of Systems   Constitutional: Positive for diaphoresis. Negative for fever.   HENT: Negative for sore throat.    Eyes: Positive for visual disturbance.   Respiratory: Positive for shortness of breath. Negative for cough.    Cardiovascular: Positive for chest pain (L sided).   Gastrointestinal: Negative for abdominal pain, diarrhea and vomiting.   Genitourinary: Negative for dysuria.   Musculoskeletal: Negative for neck pain.   Skin: Negative for rash.   Neurological: Positive for headaches. Negative for weakness and numbness.   All other systems reviewed and are negative.      PHYSICAL EXAM  ED Triage Vitals [20 2339]   Temp Heart Rate Resp BP SpO2   98.4 °F (36.9 °C) 82 18 -- 97 %      Temp src  Heart Rate Source Patient Position BP Location FiO2 (%)   Tympanic -- -- -- --       Physical Exam   Constitutional: She is oriented to person, place, and time. No distress.   HENT:   Head: Normocephalic and atraumatic.   Eyes: Pupils are equal, round, and reactive to light. EOM are normal.   Neck: Normal range of motion. Neck supple.   Cardiovascular: Normal rate, regular rhythm and normal heart sounds. Exam reveals no gallop and no friction rub.   No murmur heard.  Pulmonary/Chest: Effort normal and breath sounds normal. No respiratory distress. She exhibits tenderness (point to the L lower chest wall just left of the sternum). She exhibits no crepitus and no deformity.   Abdominal: Soft. There is no tenderness. There is no rebound and no guarding.   Musculoskeletal: Normal range of motion. She exhibits no edema.   Neurological: She is alert and oriented to person, place, and time. She has normal sensation and normal strength.   Skin: Skin is warm and dry. No rash noted.   Psychiatric: Mood and affect normal.   Nursing note and vitals reviewed.      LAB RESULTS  Lab Results (last 24 hours)     Procedure Component Value Units Date/Time    Urinalysis With Microscopic If Indicated (No Culture) - Urine, Clean Catch [[id number]]  (Abnormal) Collected:  01/02/20 0057    Specimen:  Urine, Clean Catch Updated:  01/02/20 0129     Color, UA Yellow     Appearance, UA Clear     pH, UA <=5.0     Specific Gravity, UA 1.015     Glucose, UA Negative     Ketones, UA Negative     Bilirubin, UA Negative     Blood, UA Moderate (2+)     Protein, UA Negative     Leuk Esterase, UA Trace     Nitrite, UA Negative     Urobilinogen, UA 0.2 E.U./dL    Urinalysis, Microscopic Only - Urine, Clean Catch [[id number]] Collected:  01/02/20 0057    Specimen:  Urine, Clean Catch Updated:  01/02/20 0132     RBC, UA 0-2 /HPF      WBC, UA 0-2 /HPF      Bacteria, UA None Seen /HPF      Squamous Epithelial Cells, UA 0-2 /HPF      Hyaline Casts, UA 3-6 /LPF       Methodology Automated Microscopy    CBC & Differential [[id number]] Collected:  01/02/20 0106    Specimen:  Blood Updated:  01/02/20 0132    Narrative:       The following orders were created for panel order CBC & Differential.  Procedure                               Abnormality         Status                     ---------                               -----------         ------                     CBC Auto Differential[[id number]]        Abnormal            Final result                 Please view results for these tests on the individual orders.    Comprehensive Metabolic Panel [[id number]]  (Abnormal) Collected:  01/02/20 0106    Specimen:  Blood Updated:  01/02/20 0153     Glucose 111 mg/dL      BUN 22 mg/dL      Creatinine 1.02 mg/dL      Sodium 142 mmol/L      Potassium 3.6 mmol/L      Chloride 104 mmol/L      CO2 24.2 mmol/L      Calcium 8.8 mg/dL      Total Protein 6.9 g/dL      Albumin 4.10 g/dL      ALT (SGPT) 18 U/L      AST (SGOT) 18 U/L      Alkaline Phosphatase 84 U/L      Total Bilirubin 0.2 mg/dL      eGFR Non African Amer 53 mL/min/1.73      Globulin 2.8 gm/dL      A/G Ratio 1.5 g/dL      BUN/Creatinine Ratio 21.6     Anion Gap 13.8 mmol/L     Narrative:       GFR Normal >60  Chronic Kidney Disease <60  Kidney Failure <15      Protime-INR [[id number]]  (Normal) Collected:  01/02/20 0106    Specimen:  Blood Updated:  01/02/20 0141     Protime 12.5 Seconds      INR 0.96    Troponin [[id number]]  (Normal) Collected:  01/02/20 0106    Specimen:  Blood Updated:  01/02/20 0153     Troponin T <0.010 ng/mL     Narrative:       Troponin T Reference Range:  <= 0.03 ng/mL-   Negative for AMI  >0.03 ng/mL-     Abnormal for myocardial necrosis.  Clinicians would have to utilize clinical acumen, EKG, Troponin and serial changes to determine if it is an Acute Myocardial Infarction or myocardial injury due to an underlying chronic condition.     BNP [[id number]]  (Abnormal) Collected:  01/02/20 0106    Specimen:   Blood Updated:  01/02/20 0152     proBNP 1,052.0 pg/mL     Narrative:       Among patients with dyspnea, NT-proBNP is highly sensitive for the detection of acute congestive heart failure. In addition NT-proBNP of <300 pg/ml effectively rules out acute congestive heart failure with 99% negative predictive value.      Sedimentation Rate [[id number]]  (Normal) Collected:  01/02/20 0106    Specimen:  Blood Updated:  01/02/20 0201     Sed Rate 10 mm/hr     CBC Auto Differential [[id number]]  (Abnormal) Collected:  01/02/20 0106    Specimen:  Blood Updated:  01/02/20 0132     WBC 6.25 10*3/mm3      RBC 3.74 10*6/mm3      Hemoglobin 12.1 g/dL      Hematocrit 35.8 %      MCV 95.7 fL      MCH 32.4 pg      MCHC 33.8 g/dL      RDW 13.6 %      RDW-SD 47.1 fl      MPV 9.4 fL      Platelets 231 10*3/mm3      Neutrophil % 58.2 %      Lymphocyte % 25.9 %      Monocyte % 11.0 %      Eosinophil % 4.2 %      Basophil % 0.5 %      Immature Grans % 0.2 %      Neutrophils, Absolute 3.64 10*3/mm3      Lymphocytes, Absolute 1.62 10*3/mm3      Monocytes, Absolute 0.69 10*3/mm3      Eosinophils, Absolute 0.26 10*3/mm3      Basophils, Absolute 0.03 10*3/mm3      Immature Grans, Absolute 0.01 10*3/mm3      nRBC 0.0 /100 WBC     Troponin [[id number]]  (Normal) Collected:  01/02/20 0325    Specimen:  Blood Updated:  01/02/20 0357     Troponin T <0.010 ng/mL     Narrative:       Troponin T Reference Range:  <= 0.03 ng/mL-   Negative for AMI  >0.03 ng/mL-     Abnormal for myocardial necrosis.  Clinicians would have to utilize clinical acumen, EKG, Troponin and serial changes to determine if it is an Acute Myocardial Infarction or myocardial injury due to an underlying chronic condition.           I ordered the above labs and reviewed the results    RADIOLOGY  CT Head Without Contrast   Final Result   No acute findings.       Radiation dose reduction techniques were utilized, including automated   exposure control and exposure modulation based on body  "size.       This report was finalized on 1/2/2020 3:04 AM by Dr. Elizabeth Houston M.D.          XR Chest 2 View   Final Result   No acute findings.       This report was finalized on 1/2/2020 1:28 AM by Dr. Elizabeth Houston M.D.               I ordered the above noted radiological studies. Interpreted by radiologist. Reviewed by me in PACS.       PROCEDURES  EKG          EKG time: 2343  Rhythm/Rate: SR, 75  P waves and KS: normal  QRS, axis: LBBB   ST and T waves: ST elevation secondary to IVCD     Interpreted Contemporaneously by me, independently viewed  Unchanged compared to prior 2/19/19    HEART SCORE    History Slightly or non-suspicious (0)  ECG Normal (0)  Age > or = 65 (2)  Risk factors > or = to 3 RF for atherosclerotic dx (2)  Troponin < or = Normal limit (0)    This patient's HEART score is 4    HEART Score Key:  Scores 0-3: 0.9-1.7% risk of adverse cardiac event. In the HEART Score study, these patients were discharged (0.99% in the retrospective study, 1.7% in the prospective study)  Scores 4-6: 12-16.6% risk of adverse cardiac event. In the HEART Score study, these patients were admitted to the hospital. (11.6% retrospective, 16.6% prospective)  Scores ?7: 50-65% risk of adverse cardiac event. In the HEART Score study, these patients were candidates for early invasive measures. (65.2% retrospective, 50.1% prospective)        PROGRESS AND CONSULTS     2340 ECG ordered.    0039 CXR ordered. Nitrostat ordered. UA ordered. Troponin ordered. BNP ordered. Sedimentation rate ordered. Labs ordered.    0203 CT Head without contrast ordered.     0204 Patient is resting comfortably and in NAD. The pt states she just experienced a \"burning\" sensation to the L side of chest which radiated to the back prior to my arrival to Patient is stable. BP- 158/85 HR- 72 Temp- 98.4 °F (36.9 °C) (Tympanic) O2 sat- 95%. Informed the patient BNP is slightly elevated and a Sed Rate of 10. Discussed the plan for CT Head " without contrast. Pt understands and agrees with the plan, all questions answered.    0209 Troponin ordered.    0359 Rechecked pt. Pt is resting comfortably in NAD with HR of 69 and BP of 144/85. Discussed w/pt CT Head is negative acute and repeat Troponin is negative. There is no concern for tumor hemorrhage or stroke at this time. Plan is to d/c pt with f/u to GI and Cardiology. Pt will be d/c with Prilosec and is advised to use Tums or similar for GERD. Pt understands and agrees with the plan. All questions were answered.    MEDICAL DECISION MAKING  Results were reviewed/discussed with the patient and they were also made aware of online access. Pt also made aware that some labs, such as cultures, will not be resulted during ER visit and follow up with PMD is necessary.     MDM  Number of Diagnoses or Management Options     Amount and/or Complexity of Data Reviewed  Clinical lab tests: ordered and reviewed (Sed Rate 10  WBC 6.25  Troponin <0.010  INR 0.96)  Tests in the radiology section of CPT®: ordered and reviewed (CXR  CT Head)  Tests in the medicine section of CPT®: ordered and reviewed (See EKG procedure notes)  Decide to obtain previous medical records or to obtain history from someone other than the patient: yes  Independent visualization of images, tracings, or specimens: yes           DIAGNOSIS  Final diagnoses:   Atypical chest pain   Left temporal headache       DISPOSITION  DISCHARGE    Patient discharged in stable condition.    Reviewed implications of results, diagnosis, meds, responsibility to follow up, warning signs and symptoms of possible worsening, potential complications and reasons to return to ER.    Patient/Family voiced understanding of above instructions.    Discussed plan for discharge, as there is no emergent indication for admission. Patient referred to primary care provider for BP management due to today's BP. Pt/family is agreeable and understands need for follow up and repeat  testing.  Pt is aware that discharge does not mean that nothing is wrong but it indicates no emergency is present that requires admission and they must continue care with follow-up as given below or physician of their choice.     FOLLOW-UP  Trenton Trinh MD  [address] [facility]  [address]  [place] [address]  [phone]    Schedule an appointment as soon as possible for a visit   for chest pain    Danuta Lynne MD  [address] [facility]  [address]  [place] [address]  [phone]    Schedule an appointment as soon as possible for a visit   for follw up of chest pain and headache    [facility] NEUROLOGY  [address]  [facility] [address] [phone]  Call  for follow up of headache         Medication List      New Prescriptions    omeprazole 40 MG capsule  Commonly known as:  priLOSEC  Take 1 capsule by mouth 2 (Two) Times a Day.     traMADol 50 MG tablet  Commonly known as:  ULTRAM  Take 1 tablet by mouth Every 6 (Six) Hours As Needed for Moderate Pain .            Latest Documented Vital Signs:  As of 7:30 AM  BP- 134/82 HR- 69 Temp- 98.4 °F (36.9 °C) (Tympanic) O2 sat- 93%    --  Documentation assistance provided by mireya Pryor for Dr. Clint Kirk.  Information recorded by the mireya was done at my direction and has been verified and validated by me.     Konstantin Cummings  01/02/20 [id number]       Clint Kirk MD  01/02/20 [id number]

## 2020-01-02 NOTE — ED TRIAGE NOTES
"Pt reports having CP \"all day\", states she is a \"heart patient\". No asprin today. No SOB, no N/V. Pain is left sided, wraps around to the upper back and pt also reports sharp pains \"in my temples.\"  "

## 2020-01-10 ENCOUNTER — OFFICE VISIT (OUTPATIENT)
Dept: SURGERY | Facility: CLINIC | Age: 75
End: 2020-01-10

## 2020-01-10 VITALS
WEIGHT: 182.7 LBS | HEIGHT: 62 IN | SYSTOLIC BLOOD PRESSURE: 140 MMHG | OXYGEN SATURATION: 96 % | TEMPERATURE: 98.2 F | BODY MASS INDEX: 33.62 KG/M2 | RESPIRATION RATE: 16 BRPM | DIASTOLIC BLOOD PRESSURE: 94 MMHG | HEART RATE: 70 BPM

## 2020-01-10 DIAGNOSIS — K57.92 DIVERTICULITIS: Primary | ICD-10-CM

## 2020-01-10 PROCEDURE — 99214 OFFICE O/P EST MOD 30 MIN: CPT | Performed by: COLON & RECTAL SURGERY

## 2020-01-10 RX ORDER — MELOXICAM 15 MG/1
TABLET ORAL
COMMUNITY
Start: 2019-12-18 | End: 2020-05-22

## 2020-01-10 NOTE — PROGRESS NOTES
"Olivia Addison is a 74 y.o. female in for follow up of Diverticulitis    Pt presents to discuss surgical options for diverticulitis.    She has not had any episodes of diverticulitis since last appt    /94 (BP Location: Left arm, Patient Position: Sitting, Cuff Size: Small Adult)   Pulse 70   Temp 98.2 °F (36.8 °C) (Oral)   Resp 16   Ht 157.5 cm (62.01\")   Wt 82.9 kg (182 lb 11.2 oz)   SpO2 96%   Breastfeeding No   BMI 33.41 kg/m²   Body mass index is 33.41 kg/m².      PE:  Physical Exam   Constitutional: She appears well-developed. No distress.   HENT:   Head: Normocephalic and atraumatic.   Abdominal: Soft. She exhibits no distension.   Musculoskeletal: Normal range of motion.   Neurological: She is alert.   Psychiatric: Thought content normal.       Assessment:   1. Diverticulitis         Plan:    Discussion with pt regarding colonoscopy results: diverticulosis throughout entire colon.  She has had sigmoid diverticulitis in the past.    Discussed laparoscopic sigmoid colon resection and possible diverting loop ileostomy.  I discussed with patient that she meets the current criteria to do an elective sigmoid resection.  She has had recurrent diverticulitis in a short period of time.  She is not quite sure about surgery.  I discussed with them typical surgical time, hospital recovery, and home recovery.   I described to the patient risks, benefits, and alternatives. I discussed risks of surgery being heart attack, stroke, exacerbation of chronic medical illness, pneumonia, DVT, PTE, infection, bleeding, and injury to other organs during surgery. Patient expresses understanding and does not wish to proceed at this time.    She states if she has one more episode, she will definitely want to proceed with surgery.    To help prevent formation of additional diverticula, I recommend fiber therapy and detailed and gave written instructions on how to achieve a high fiber diet.    RTC prn    Time with pt 30 " mins 90 %counseling  Scribed for Mica Edwards MD by Sabrina Dorsey PA-C  1/10/2020   This patient was evaluated by me, recommendations made, documentation reviewed, edited, and revised by me, Mica Edwards MD

## 2020-01-24 ENCOUNTER — TELEPHONE (OUTPATIENT)
Dept: ORTHOPEDIC SURGERY | Facility: CLINIC | Age: 75
End: 2020-01-24

## 2020-01-24 NOTE — TELEPHONE ENCOUNTER
Patient scheduled right knee cortisone injection with MLL on 2/14/20. She says last injection only helped about 1 & 1/2 months. Should she try the gel instead? Also says Meloxicam not helping. Something else?

## 2020-01-27 DIAGNOSIS — G89.29 CHRONIC PAIN OF BOTH KNEES: Primary | ICD-10-CM

## 2020-01-27 DIAGNOSIS — M25.561 CHRONIC PAIN OF BOTH KNEES: Primary | ICD-10-CM

## 2020-01-27 DIAGNOSIS — M25.562 CHRONIC PAIN OF BOTH KNEES: Primary | ICD-10-CM

## 2020-01-28 ENCOUNTER — TELEPHONE (OUTPATIENT)
Dept: ORTHOPEDIC SURGERY | Facility: CLINIC | Age: 75
End: 2020-01-28

## 2020-01-28 DIAGNOSIS — M17.11 PRIMARY OSTEOARTHRITIS OF RIGHT KNEE: Primary | ICD-10-CM

## 2020-01-28 NOTE — TELEPHONE ENCOUNTER
SPOKE WITH PT IN DETAILM ABOUT  MESSAGE.  SHE WOULD LIKE TO TRY THE GEL (MONOVISC). DO YOU WANT ME TO PLACE ORDER FOR YOU??    SHE SAID OK TO LEAVE ON HER MACHINE.

## 2020-01-28 NOTE — TELEPHONE ENCOUNTER
"Patient was scheduled for RIGHT Knee ELVIA INJ on 02/14/2020 (previous ELVIA INJ 10/29/19) - MLL ok'd RIGHT Knee GEL INJ (Monovisc) on 02/14/2020 instead.     Khushi copied on message to get prior auth for GEL INJ.     Patient asking if she can be seen sooner for RIGHT Knee GEL INJ - also asking if \"can get stronger dosage of Meloxicam?\" (patient was previously \"prescribed Meloxicam 15 MG about 3 months ago by Dr. Valladares\"). Patient stated she \"took four 200 MG Ibuprofen yesterday which did not touch the pain\" Thanks /srh   "

## 2020-01-28 NOTE — TELEPHONE ENCOUNTER
The strongest dose of meloxicam is 15 mg daily.  She can try adding on Tylenol.  With regards to the Monovisc I am happy to work her in a week or 2 earlier but we need prior authorization first.  Let me know as soon as we get the authorization and I can get her a date

## 2020-02-05 ENCOUNTER — TELEPHONE (OUTPATIENT)
Dept: ORTHOPEDIC SURGERY | Facility: CLINIC | Age: 75
End: 2020-02-05

## 2020-02-05 NOTE — TELEPHONE ENCOUNTER
[facility] HAS APPROVAL FOR MONOVISC INJ FOR 2-14, PAT WANTS TO BE WORKED IN TOMORROW OR Friday WITH YOU?

## 2020-02-06 ENCOUNTER — TELEPHONE (OUTPATIENT)
Dept: ORTHOPEDIC SURGERY | Facility: CLINIC | Age: 75
End: 2020-02-06

## 2020-02-06 NOTE — TELEPHONE ENCOUNTER
PATIENT R/S FRM 2-14 FOR MONOVISC INJ TO 2-7 OK PER MLL, NO PRECERT NEEDED FOR 2-14 JUST WANTED YOU TO KNOW SHE R/S

## 2020-02-07 ENCOUNTER — CLINICAL SUPPORT (OUTPATIENT)
Dept: ORTHOPEDIC SURGERY | Facility: CLINIC | Age: 75
End: 2020-02-07

## 2020-02-07 VITALS — HEIGHT: 55 IN | TEMPERATURE: 97.7 F | WEIGHT: 177 LBS | BODY MASS INDEX: 40.96 KG/M2

## 2020-02-07 DIAGNOSIS — M17.11 PRIMARY OSTEOARTHRITIS OF RIGHT KNEE: ICD-10-CM

## 2020-02-07 PROCEDURE — 20610 DRAIN/INJ JOINT/BURSA W/O US: CPT | Performed by: NURSE PRACTITIONER

## 2020-02-07 NOTE — PROGRESS NOTES
2/7/2020    Olivia Addison is here today for worsening knee pain. Pt has undergone injection of the knee in the past with good resolution of symptoms. Pt is requesting a repeat injection.     KNEE Injection Procedure Note:    Large Joint Arthrocentesis: R knee  Date/Time: 2/7/2020 4:30 PM  Consent given by: patient  Site marked: site marked  Timeout: Immediately prior to procedure a time out was called to verify the correct patient, procedure, equipment, support staff and site/side marked as required   Supporting Documentation  Indications: pain and joint swelling   Procedure Details  Location: knee - R knee  Preparation: Patient was prepped and draped in the usual sterile fashion  Needle size: 22 G  Approach: anterolateral  Medications administered: 2 mL lidocaine (cardiac); 88 mg Hyaluronan 88 MG/4ML  Patient tolerance: patient tolerated the procedure well with no immediate complications          At the conclusion of the injection I discussed the importance of continued quad strengthening exercises on a daily basis. I will see the patient back if the symptoms should fail to improve or worsen.    Joy Guillermo, APRN  2/7/2020

## 2020-02-12 ENCOUNTER — TELEPHONE (OUTPATIENT)
Dept: ORTHOPEDIC SURGERY | Facility: CLINIC | Age: 75
End: 2020-02-12

## 2020-02-12 NOTE — TELEPHONE ENCOUNTER
PLEASE CALL RE: THE PAIN IN HER R KNEE. HAD THE GEL INJECTION LAST WEEK. WOULD LIKE FOR MLL TO CALL HER PLEASE

## 2020-02-13 NOTE — TELEPHONE ENCOUNTER
I spoke with patient, she has had none no increase in knee pain since gel injection, simply her arthritic pain has continued and she has not seen much improvement.  She does have bone-on-bone end-stage osteoarthritis.  I advised her that these injections may not help at all.  She does have an appoint with Dr. Mccrary in about a month and I have recommended that she keep that appointment to see how she is doing and at some point she may very well need total knee replacement.

## 2020-05-14 ENCOUNTER — OFFICE VISIT (OUTPATIENT)
Dept: ORTHOPEDIC SURGERY | Facility: CLINIC | Age: 75
End: 2020-05-14

## 2020-05-14 VITALS — WEIGHT: 174 LBS | TEMPERATURE: 98.2 F | BODY MASS INDEX: 32.02 KG/M2 | HEIGHT: 62 IN

## 2020-05-14 DIAGNOSIS — M70.51 PES ANSERINUS BURSITIS OF RIGHT KNEE: Primary | ICD-10-CM

## 2020-05-14 PROCEDURE — 20610 DRAIN/INJ JOINT/BURSA W/O US: CPT | Performed by: ORTHOPAEDIC SURGERY

## 2020-05-14 PROCEDURE — 99213 OFFICE O/P EST LOW 20 MIN: CPT | Performed by: ORTHOPAEDIC SURGERY

## 2020-05-14 RX ORDER — METHYLPREDNISOLONE ACETATE 80 MG/ML
80 INJECTION, SUSPENSION INTRA-ARTICULAR; INTRALESIONAL; INTRAMUSCULAR; SOFT TISSUE
Status: COMPLETED | OUTPATIENT
Start: 2020-05-14 | End: 2020-05-14

## 2020-05-14 RX ORDER — LOSARTAN POTASSIUM 100 MG/1
100 TABLET ORAL DAILY
COMMUNITY
Start: 2020-02-11 | End: 2020-10-08 | Stop reason: ALTCHOICE

## 2020-05-14 RX ADMIN — METHYLPREDNISOLONE ACETATE 80 MG: 80 INJECTION, SUSPENSION INTRA-ARTICULAR; INTRALESIONAL; INTRAMUSCULAR; SOFT TISSUE at 09:42

## 2020-05-14 NOTE — PROGRESS NOTES
Patient: Olivia Addison  YOB: 1945 74 y.o. female  Medical Record Number: [id number]    Chief Complaints:   Chief Complaint   Patient presents with   • Right Knee - Follow-up       History of Present Illness:Olivia Addison is a 74 y.o. female who presents for follow-up of right medial knee pain.  She has seen Lin since October and underwent initially cortisone injections which helped for about a month or so.  She later in February underwent Visco supplementation injections and states that she did not have any relief from those.  She describes an aching burning pain about the medial aspect of the right knee.  She states it will wake her up at night.  She rates the pain as a 4 out of 10.    Allergies:   Allergies   Allergen Reactions   • Sulfa Antibiotics Itching and Rash     Hands turned red w/a rash       Medications:   Current Outpatient Medications   Medication Sig Dispense Refill   • atorvastatin (LIPITOR) 40 MG tablet Take 40 mg by mouth daily.     • buPROPion XL (WELLBUTRIN XL) 300 MG 24 hr tablet Take 300 mg by mouth Daily.     • carvedilol (COREG) 25 MG tablet Take 25 mg by mouth 2 (two) times a day.     • furosemide (LASIX) 40 MG tablet TAKE ONE TABLET BY MOUTH DAILY 90 tablet 2   • ibuprofen (ADVIL,MOTRIN) 600 MG tablet Take 1 tablet by mouth Every 6 (Six) Hours As Needed for Mild Pain . 20 tablet 0   • losartan (COZAAR) 100 MG tablet Take 100 mg by mouth Daily.     • potassium chloride (K-DUR,KLOR-CON) 20 MEQ CR tablet TAKE ONE TABLET BY MOUTH DAILY 90 tablet 2   • sertraline (ZOLOFT) 100 MG tablet Take 100 mg by mouth Daily.  2   • acyclovir (ZOVIRAX) 400 MG tablet Take 400 mg by mouth 2 (two) times a day. Take no more than 5 doses a day.     • losartan (COZAAR) 50 MG tablet Take 50 mg by mouth Daily.     • meloxicam (MOBIC) 15 MG tablet      • omeprazole (priLOSEC) 40 MG capsule Take 1 capsule by mouth 2 (Two) Times a Day. 40 capsule 0   • ondansetron (ZOFRAN) 4 MG tablet Take 1  "tablet by mouth Every 6 (Six) Hours As Needed for Nausea or Vomiting. 10 tablet 0   • traMADol (ULTRAM) 50 MG tablet Take 1 tablet by mouth Every 6 (Six) Hours As Needed for Moderate Pain . 15 tablet 0     No current facility-administered medications for this visit.          The following portions of the patient's history were reviewed and updated as appropriate: allergies, current medications, past family history, past medical history, past social history, past surgical history and problem list.    Review of Systems:   A 14 point review of systems was performed. All systems negative except pertinent positives/negative listed in HPI above    Physical Exam:   Vitals:    05/14/20 0915   Temp: 98.2 °F (36.8 °C)   TempSrc: Temporal   Weight: 78.9 kg (174 lb)   Height: 157.5 cm (62\")   PainSc:   4   PainLoc: Knee       General: A and O x 3, ASA, NAD    SCLERA:    Normal    DENTITION:   Normal  Knee:  right    ALIGNMENT:     Neutral  ,   Patella tracks   midline    GAIT:     Nonantalgic    SKIN:    No abnormality    RANGE OF MOTION:   0  -  135   DEG    STRENGTH:   5 / 5    LIGAMENTS:    No varus / valgus instability.   Negative  Lachman.    MENISCUS:     Negative   Marta       DISTAL PULSES:    Paplable    DISTAL SENSATION :   Intact    LYMPHATICS:     No   lymphadenopathy    OTHER:          - No  effusion      - No crepitance with ROM      +Swelling and tenderness to palpation pes anserine bursa       Radiology:  Xrays 3views right knee (ap,lateral, sunrise) taken previously demonstrating mild joint space narrowing medially but no evidence of bone-on-bone articulation      Assessment/Plan:  Right knee pain not responsive to gel injections.  She did have some early response to cortisone injections.  Her symptoms today are somewhat confusing may be more advanced arthritis and x-rays suggest or she may have peds anserine bursitis.  I injected the peds anserine bursa as below and will send her to therapy.  If she is not " better in a month she will call back would consider an MRI for that were to occur.  Large Joint Arthrocentesis: R knee  Date/Time: 5/14/2020 9:42 AM  Consent given by: patient  Site marked: site marked  Timeout: Immediately prior to procedure a time out was called to verify the correct patient, procedure, equipment, support staff and site/side marked as required   Supporting Documentation  Indications: pain and joint swelling   Procedure Details  Location: knee - R knee  Preparation: Patient was prepped and draped in the usual sterile fashion  Needle size: 18 G  Approach: medial  Medications administered: 80 mg methylPREDNISolone acetate 80 MG/ML; 2 mL lidocaine (cardiac)

## 2020-05-22 ENCOUNTER — HOSPITAL ENCOUNTER (OUTPATIENT)
Dept: CARDIOLOGY | Facility: HOSPITAL | Age: 75
Discharge: HOME | End: 2020-05-22
Admitting: INTERNAL MEDICINE

## 2020-05-22 ENCOUNTER — OFFICE VISIT (OUTPATIENT)
Dept: CARDIOLOGY | Facility: CLINIC | Age: 75
End: 2020-05-22

## 2020-05-22 VITALS
BODY MASS INDEX: 31.91 KG/M2 | HEIGHT: 62 IN | DIASTOLIC BLOOD PRESSURE: 70 MMHG | WEIGHT: 173.4 LBS | HEART RATE: 66 BPM | SYSTOLIC BLOOD PRESSURE: 148 MMHG

## 2020-05-22 VITALS — OXYGEN SATURATION: 96 %

## 2020-05-22 DIAGNOSIS — Z98.890 S/P MVR (MITRAL VALVE REPAIR): ICD-10-CM

## 2020-05-22 DIAGNOSIS — Z98.890 H/O TRICUSPID VALVE REPAIR: ICD-10-CM

## 2020-05-22 DIAGNOSIS — I10 ESSENTIAL HYPERTENSION: Primary | ICD-10-CM

## 2020-05-22 PROBLEM — I38 VALVULAR DISEASE: Status: RESOLVED | Noted: 2017-11-08 | Resolved: 2020-05-22

## 2020-05-22 PROBLEM — I50.9 CHF (CONGESTIVE HEART FAILURE) (HCC): Status: RESOLVED | Noted: 2018-02-16 | Resolved: 2020-05-22

## 2020-05-22 LAB
AORTIC ROOT ANNULUS: 1.9 CM
ASCENDING AORTA: 2.9 CM
BH CV ECHO MEAS - ACS: 1.6 CM
BH CV ECHO MEAS - AI DEC SLOPE: 159.1 CM/SEC^2
BH CV ECHO MEAS - AI MAX PG: 47.7 MMHG
BH CV ECHO MEAS - AI MAX VEL: 345.3 CM/SEC
BH CV ECHO MEAS - AI P1/2T: 635.8 MSEC
BH CV ECHO MEAS - AO MAX PG (FULL): 5.9 MMHG
BH CV ECHO MEAS - AO MAX PG: 11.5 MMHG
BH CV ECHO MEAS - AO MEAN PG (FULL): 4.2 MMHG
BH CV ECHO MEAS - AO MEAN PG: 7.2 MMHG
BH CV ECHO MEAS - AO ROOT AREA (BSA CORRECTED): 1.7
BH CV ECHO MEAS - AO ROOT AREA: 6.9 CM^2
BH CV ECHO MEAS - AO ROOT DIAM: 3 CM
BH CV ECHO MEAS - AO V2 MAX: 169.4 CM/SEC
BH CV ECHO MEAS - AO V2 MEAN: 127 CM/SEC
BH CV ECHO MEAS - AO V2 VTI: 38.9 CM
BH CV ECHO MEAS - ASC AORTA: 2.9 CM
BH CV ECHO MEAS - AVA(I,A): 2.2 CM^2
BH CV ECHO MEAS - AVA(I,D): 2.2 CM^2
BH CV ECHO MEAS - AVA(V,A): 2.3 CM^2
BH CV ECHO MEAS - AVA(V,D): 2.3 CM^2
BH CV ECHO MEAS - BSA(HAYCOCK): 1.9 M^2
BH CV ECHO MEAS - BSA: 1.8 M^2
BH CV ECHO MEAS - BZI_BMI: 31.5 KILOGRAMS/M^2
BH CV ECHO MEAS - BZI_METRIC_HEIGHT: 157.5 CM
BH CV ECHO MEAS - BZI_METRIC_WEIGHT: 78 KG
BH CV ECHO MEAS - EDV(MOD-SP2): 128 ML
BH CV ECHO MEAS - EDV(MOD-SP4): 124 ML
BH CV ECHO MEAS - EDV(TEICH): 134.7 ML
BH CV ECHO MEAS - EF(CUBED): 31.6 %
BH CV ECHO MEAS - EF(MOD-BP): 34 %
BH CV ECHO MEAS - EF(MOD-SP2): 34.4 %
BH CV ECHO MEAS - EF(MOD-SP4): 33.9 %
BH CV ECHO MEAS - EF(TEICH): 25.5 %
BH CV ECHO MEAS - ESV(MOD-SP2): 84 ML
BH CV ECHO MEAS - ESV(MOD-SP4): 82 ML
BH CV ECHO MEAS - ESV(TEICH): 100.4 ML
BH CV ECHO MEAS - FS: 11.9 %
BH CV ECHO MEAS - IVS/LVPW: 0.93
BH CV ECHO MEAS - IVSD: 1.1 CM
BH CV ECHO MEAS - LAT PEAK E' VEL: 5 CM/SEC
BH CV ECHO MEAS - LV DIASTOLIC VOL/BSA (35-75): 69.2 ML/M^2
BH CV ECHO MEAS - LV MASS(C)D: 234.9 GRAMS
BH CV ECHO MEAS - LV MASS(C)DI: 131 GRAMS/M^2
BH CV ECHO MEAS - LV MAX PG: 5.5 MMHG
BH CV ECHO MEAS - LV MEAN PG: 3.1 MMHG
BH CV ECHO MEAS - LV SYSTOLIC VOL/BSA (12-30): 45.7 ML/M^2
BH CV ECHO MEAS - LV V1 MAX: 117.7 CM/SEC
BH CV ECHO MEAS - LV V1 MEAN: 81.6 CM/SEC
BH CV ECHO MEAS - LV V1 VTI: 25.4 CM
BH CV ECHO MEAS - LVIDD: 5.3 CM
BH CV ECHO MEAS - LVIDS: 4.7 CM
BH CV ECHO MEAS - LVLD AP2: 6.6 CM
BH CV ECHO MEAS - LVLD AP4: 6.7 CM
BH CV ECHO MEAS - LVLS AP2: 5.7 CM
BH CV ECHO MEAS - LVLS AP4: 5.5 CM
BH CV ECHO MEAS - LVOT AREA (M): 3.5 CM^2
BH CV ECHO MEAS - LVOT AREA: 3.3 CM^2
BH CV ECHO MEAS - LVOT DIAM: 2.1 CM
BH CV ECHO MEAS - LVPWD: 1.2 CM
BH CV ECHO MEAS - MED PEAK E' VEL: 4 CM/SEC
BH CV ECHO MEAS - MV A DUR: 0.11 SEC
BH CV ECHO MEAS - MV A MAX VEL: 133 CM/SEC
BH CV ECHO MEAS - MV DEC SLOPE: 399.3 CM/SEC^2
BH CV ECHO MEAS - MV DEC TIME: 0.22 SEC
BH CV ECHO MEAS - MV E MAX VEL: 124 CM/SEC
BH CV ECHO MEAS - MV E/A: 0.93
BH CV ECHO MEAS - MV MAX PG: 7.1 MMHG
BH CV ECHO MEAS - MV MEAN PG: 3.8 MMHG
BH CV ECHO MEAS - MV P1/2T MAX VEL: 141.2 CM/SEC
BH CV ECHO MEAS - MV P1/2T: 103.5 MSEC
BH CV ECHO MEAS - MV V2 MAX: 133 CM/SEC
BH CV ECHO MEAS - MV V2 MEAN: 94.9 CM/SEC
BH CV ECHO MEAS - MV V2 VTI: 49.9 CM
BH CV ECHO MEAS - MVA P1/2T LCG: 1.6 CM^2
BH CV ECHO MEAS - MVA(P1/2T): 2.1 CM^2
BH CV ECHO MEAS - MVA(VTI): 1.7 CM^2
BH CV ECHO MEAS - PA MAX PG (FULL): 3 MMHG
BH CV ECHO MEAS - PA MAX PG: 4.5 MMHG
BH CV ECHO MEAS - PA V2 MAX: 105.9 CM/SEC
BH CV ECHO MEAS - PVA(V,A): 2.1 CM^2
BH CV ECHO MEAS - PVA(V,D): 2.1 CM^2
BH CV ECHO MEAS - QP/QS: 0.49
BH CV ECHO MEAS - RAP SYSTOLE: 3 MMHG
BH CV ECHO MEAS - RV MAX PG: 1.5 MMHG
BH CV ECHO MEAS - RV MEAN PG: 0.74 MMHG
BH CV ECHO MEAS - RV V1 MAX: 60.4 CM/SEC
BH CV ECHO MEAS - RV V1 MEAN: 41 CM/SEC
BH CV ECHO MEAS - RV V1 VTI: 11 CM
BH CV ECHO MEAS - RVOT AREA: 3.7 CM^2
BH CV ECHO MEAS - RVOT DIAM: 2.2 CM
BH CV ECHO MEAS - RVSP: 27.5 MMHG
BH CV ECHO MEAS - SI(AO): 150.7 ML/M^2
BH CV ECHO MEAS - SI(CUBED): 26.1 ML/M^2
BH CV ECHO MEAS - SI(LVOT): 47.1 ML/M^2
BH CV ECHO MEAS - SI(MOD-SP2): 24.5 ML/M^2
BH CV ECHO MEAS - SI(MOD-SP4): 23.4 ML/M^2
BH CV ECHO MEAS - SI(TEICH): 19.2 ML/M^2
BH CV ECHO MEAS - SUP REN AO DIAM: 1.9 CM
BH CV ECHO MEAS - SV(AO): 270.1 ML
BH CV ECHO MEAS - SV(CUBED): 46.8 ML
BH CV ECHO MEAS - SV(LVOT): 84.5 ML
BH CV ECHO MEAS - SV(MOD-SP2): 44 ML
BH CV ECHO MEAS - SV(MOD-SP4): 42 ML
BH CV ECHO MEAS - SV(RVOT): 41.4 ML
BH CV ECHO MEAS - SV(TEICH): 34.3 ML
BH CV ECHO MEAS - TR MAX VEL: 247.3 CM/SEC
BH CV ECHO MEASUREMENTS AVERAGE E/E' RATIO: 27.56
LEFT ATRIUM VOLUME INDEX: 34 ML/M2
LEFT ATRIUM VOLUME: 60 CM3
LV EF 2D ECHO EST: 34 %
SINUS: 2.8 CM
STJ: 2.9 CM
TV MEAN GRADIENT: 2 MMHG
TV VTI: 40 CM

## 2020-05-22 PROCEDURE — 25010000002 PERFLUTREN (DEFINITY) 8.476 MG IN SODIUM CHLORIDE 0.9 % 10 ML INJECTION: Performed by: INTERNAL MEDICINE

## 2020-05-22 PROCEDURE — 93306 TTE W/DOPPLER COMPLETE: CPT

## 2020-05-22 PROCEDURE — 93000 ELECTROCARDIOGRAM COMPLETE: CPT | Performed by: INTERNAL MEDICINE

## 2020-05-22 PROCEDURE — 99214 OFFICE O/P EST MOD 30 MIN: CPT | Performed by: INTERNAL MEDICINE

## 2020-05-22 PROCEDURE — 93306 TTE W/DOPPLER COMPLETE: CPT | Performed by: INTERNAL MEDICINE

## 2020-05-22 RX ORDER — SPIRONOLACTONE 25 MG/1
25 TABLET ORAL DAILY
Qty: 90 TABLET | Refills: 3 | Status: SHIPPED | OUTPATIENT
Start: 2020-05-22 | End: 2021-06-21

## 2020-05-22 RX ADMIN — PERFLUTREN 1.5 ML: 6.52 INJECTION, SUSPENSION INTRAVENOUS at 15:02

## 2020-05-22 NOTE — PROGRESS NOTES
Date of Office Visit: 20  Encounter Provider: Trenton Trinh MD  Place of Service: [facility] CARDIOLOGY  Patient Name: Olivia Addison  :1945  [id number]    Chief Complaint   Patient presents with   • Medical Clearance   :     HPI: Olivia Addison is a 74 y.o. female  had a history in the past of a cardiomyopathy that was due to valvular heart disease she had a mitral valve repair and tricuspid valve repair and a complete recovery of her LV function she is here for follow-up today and has been doing well she's a little bit flustered because she did not know she had a pacemaker appointment and she thinks that's why her blood pressure is high today.  Her one of her leads in her biventricular pacemaker is not working but she does not really need it anymore as her LV function is recovered.    She is doing well no chest pain shortness of breath PND orthopnea edema unfortunately she has had a lot of diverticulitis and is scheduled to have a colon resection.  She is never had coronary disease not had any syncope or palpitations no chest pain    Past Medical History:   Diagnosis Date   • ADHD (attention deficit hyperactivity disorder)    • Adjustment disorder with anxiety    • Allergic rhinitis    • Arthritis    • Atherosclerosis    • Atrial fibrillation (CMS/HCC)    • Atrophic vaginitis    • Blepharitis of both eyes    • CAD (coronary artery disease)    • Cardiomyopathy (CMS/HCC)    • Cataract    • CHF (congestive heart failure) (CMS/HCC)     CHRONIC SYSTOLIC   • DDD (degenerative disc disease), cervical    • Depression    • Diaphoresis 2019    SEEN AT [facility] UC   • Diverticulitis 2019    SEEN AT [facility] ER   • Diverticulitis 11/15/2016   • Diverticulitis 2014   • Diverticulitis    • Diverticulitis large intestine 2017   • Diverticulitis of colon 2019    ADMITTED TO [facility]   • Dry eye syndrome    • Dyspnea    • Dysuria 2019   • Esophageal injury      ESTRELLITA-RAMOS SYNDROME   • Fatigue    • Hematuria 08/2019   • History of alcohol abuse    • Hypercholesterolemia    • Hyperlipidemia    • Hypertension    • Kidney cysts 07/2017    FOLLOWED BY DR. RITU AMOS   • LBBB (left bundle branch block)    • Estrellita-Ramos syndrome    • Memory loss    • NSVT (nonsustained ventricular tachycardia) (CMS/HCC)    • AMADO (obstructive sleep apnea)     USES MOUTHGUARDS   • Patent foramen ovale    • Pneumonia 05/25/2017   • Presbyopia    • PTSD (post-traumatic stress disorder)    • Reactive airway disease    • Regular astigmatism    • RLS (restless legs syndrome)    • S/P TVR (tricuspid valve repair)    • Seborrheic keratosis     FOLLOWED BY DR. HARMAN CÁRDENAS   • Segmental and somatic dysfunction of cervical region    • Segmental and somatic dysfunction of thoracic region    • Vitamin D deficiency    • VT (ventricular tachycardia) (CMS/HCC)        Past Surgical History:   Procedure Laterality Date   • APPENDECTOMY N/A    • BIVENTRICULLAR IMPLANTABLE CARDIOVERTER DEFIBRILLATOR PLACEMENT N/A 04/10/2012    [employer], BI-V, DR. MICKEY MORALES AT [facility]   • BLADDER SUSPENSION N/A    • BREAST CYST ASPIRATION     • BREAST SURGERY Bilateral     REDUCTION MAMMAPLASTY   • CARDIAC CATHETERIZATION Bilateral 03/19/2012    Normal coronary arteries, severe mitral regurgitation and aortic regurgitation. EF was about 30-35%.; DR. JYOTI ROMERO AT [facility]   • CARDIAC ELECTROPHYSIOLOGY PROCEDURE Left 7/11/2016    Procedure: PPM battery change [employer];  Surgeon: Hunter Faust MD;  Location: [facility] INVASIVE LOCATION;  Service:    • CATARACT EXTRACTION     • COLONOSCOPY N/A 10/14/2014    PANDIVERTICULOSIS IN ENTIRE COLON, DR. BRANDEN RUIZ AT [facility]   • COLONOSCOPY N/A 11/13/2019    SCATTERED SMALL AND LARGE DIVERTICULA IN ENTIRE COLON, RESCOPE IN 10 YRS, DR. PERCY MUHAMMAD AT [facility]   • CORONARY ARTERY BYPASS GRAFT N/A    • ENDOSCOPY N/A 06/06/2012    ESTRELLITA-RAMOS TEAR WITH ACTIVE BLEEDINGBLOOD  THROUGHOUT STOMACH, BLOOD IN DUODENUM, DR. TARUN GILLIS AT [facility]   • HYSTERECTOMY N/A     OLIVIA WITH BSO   • IMPLANTABLE CARDIOVERTER DEFIBRILLATOR LEAD REPLACEMENT/POCKET REVISION N/A 2012    DR. GISELLE BOOGIE AT [facility]   • MITRAL VALVE REPAIR/REPLACEMENT N/A 2012    REMODELING  ANULOPLASTY USING 24 MM BARAJAS ANULOPLASTY RING, MODEL 5200, SERIAL # [id number], DR. VINCE ZAMUDIO AT [facility]   • PATENT FORAMEN OVALE CLOSURE N/A 2012    DR. VINCE ZAMUDIO AT [facility]   • TONSILLECTOMY Bilateral    • TRICUSPID VALVE SURGERY N/A 2012    REMODELING ANULOPLASTY USING 26 MM BARAJAS ANULOPLASTY RING, MODEL 6200, SERIAL # [id number], DR. VINCE ZAMUDIO AT [facility]   • VAGINAL DELIVERY N/A        Social History     Socioeconomic History   • Marital status:      Spouse name: Not on file   • Number of children: Not on file   • Years of education: Not on file   • Highest education level: Not on file   Tobacco Use   • Smoking status: Former Smoker     Packs/day: 0.25     Types: Cigarettes     Last attempt to quit:      Years since quittin.4   • Smokeless tobacco: Never Used   • Tobacco comment: caffine use   Substance and Sexual Activity   • Alcohol use: Yes     Alcohol/week: 7.0 standard drinks     Types: 7 Glasses of wine per week     Comment: MODERATE AMT   • Drug use: No   • Sexual activity: Defer     Birth control/protection: Post-menopausal, Surgical       Family History   Problem Relation Age of Onset   • Diabetes Mother    • Hypertension Mother    • Emphysema Father    • Anxiety disorder Father    • Depression Father    • Hypertension Father    • Stroke Sister    • No Known Problems Brother    • Heart attack Maternal Grandmother    • Sleep apnea Maternal Grandmother    • No Known Problems Maternal Grandfather    • No Known Problems Paternal Grandmother    • No Known Problems Paternal Grandfather    • No Known Problems Brother    • No Known Problems Daughter        Review of Systems      Constitution: Negative for decreased appetite, fever, malaise/fatigue and weight loss.   HENT: Negative for nosebleeds.    Eyes: Negative for double vision.   Cardiovascular: Negative for chest pain, claudication, cyanosis, dyspnea on exertion, irregular heartbeat, leg swelling, near-syncope, orthopnea, palpitations, paroxysmal nocturnal dyspnea and syncope.   Respiratory: Negative for cough, hemoptysis and shortness of breath.    Hematologic/Lymphatic: Negative for bleeding problem.   Skin: Negative for rash.   Musculoskeletal: Negative for falls and myalgias.   Gastrointestinal: Negative for hematochezia, jaundice, melena, nausea and vomiting.   Genitourinary: Negative for hematuria.   Neurological: Negative for dizziness and seizures.   Psychiatric/Behavioral: Negative for altered mental status and memory loss.       Allergies   Allergen Reactions   • Sulfa Antibiotics Itching and Rash     Hands turned red w/a rash         Current Outpatient Medications:   •  acyclovir (ZOVIRAX) 400 MG tablet, Take 400 mg by mouth 2 (two) times a day. Take no more than 5 doses a day., Disp: , Rfl:   •  atorvastatin (LIPITOR) 40 MG tablet, Take 40 mg by mouth daily., Disp: , Rfl:   •  buPROPion XL (WELLBUTRIN XL) 300 MG 24 hr tablet, Take 300 mg by mouth Daily., Disp: , Rfl:   •  carvedilol (COREG) 25 MG tablet, Take 25 mg by mouth 2 (two) times a day., Disp: , Rfl:   •  furosemide (LASIX) 40 MG tablet, TAKE ONE TABLET BY MOUTH DAILY, Disp: 90 tablet, Rfl: 2  •  ibuprofen (ADVIL,MOTRIN) 600 MG tablet, Take 1 tablet by mouth Every 6 (Six) Hours As Needed for Mild Pain ., Disp: 20 tablet, Rfl: 0  •  losartan (COZAAR) 100 MG tablet, Take 100 mg by mouth Daily., Disp: , Rfl:   •  potassium chloride (K-DUR,KLOR-CON) 20 MEQ CR tablet, TAKE ONE TABLET BY MOUTH DAILY, Disp: 90 tablet, Rfl: 2  •  sertraline (ZOLOFT) 100 MG tablet, Take 100 mg by mouth Daily., Disp: , Rfl: 2      Objective:     There were no vitals filed for this  visit.  There is no height or weight on file to calculate BMI.    Physical Exam   Constitutional: She is oriented to person, place, and time. She appears well-developed and well-nourished.   HENT:   Head: Normocephalic.   Eyes: No scleral icterus.   Neck: No JVD present. No thyromegaly present.   Cardiovascular: Normal rate and regular rhythm. Exam reveals no gallop and no friction rub.   Murmur heard.   Low-pitched blowing early systolic murmur is present with a grade of 1/6.  Pulmonary/Chest: Effort normal and breath sounds normal. She has no wheezes. She has no rales.       Abdominal: Soft. There is no hepatosplenomegaly. There is no tenderness.   Musculoskeletal: Normal range of motion. She exhibits no edema.   Lymphadenopathy:     She has no cervical adenopathy.   Neurological: She is alert and oriented to person, place, and time.   Skin: Skin is warm and dry. No rash noted.   Psychiatric: She has a normal mood and affect.         ECG 12 Lead  Date/Time: 5/22/2020 1:25 PM  Performed by: Trenton Trinh MD  Authorized by: Trenton Trinh MD   Comparison: compared with previous ECG   Similar to previous ECG  Rhythm: sinus rhythm  Conduction: non-specific intraventricular conduction delay    Clinical impression: abnormal EKG             Assessment:       Diagnosis Plan   1. Essential hypertension     2. S/P MVR (mitral valve repair)     3. H/O tricuspid valve repair            Plan:       She is doing well and I think is an acceptable risk for her proposed surgery she is never had coronary disease and the last time we looked her LV function was normal I am going to echo her today schedule little murmur probably is a little bit of residual TR at that and will take a look at that her blood pressures a little high when to switch her over to Aldactone and when to stop her potassium and have her get a BMP in about 10 days and then I will have her come back and see Lenora or Zora in a year or see me in 2    As always,  it has been a pleasure to participate in your patient's care.      Sincerely,       Trenton Trinh MD    On her echocardiogram her LV function is not normal and was pretty reduced actually is about 35 to 40% mitral valve looks good there is a little bit of MR not much.  She is on guideline directed medical therapy and has class I heart failure symptoms so we are going to continue with our current treatment for right now

## 2020-05-27 ENCOUNTER — TELEPHONE (OUTPATIENT)
Dept: CARDIOLOGY | Facility: CLINIC | Age: 75
End: 2020-05-27

## 2020-05-27 NOTE — TELEPHONE ENCOUNTER
She is an acceptable risk for her proposed surgery.  I talked with her face-to-face about her echo

## 2020-05-27 NOTE — TELEPHONE ENCOUNTER
Gladis her/Dr. Ezra Alvarenga's office called she would like to know if the pt is clear to have   Surgery on  .  You saw her last on 5/22/20.  Please advise.    Also she called yesterday while I was awaitinng an answer from Dr. Alvarenga's office. If you haven't called her with her echo results already she would like those.    Thanks,  Lidia

## 2020-06-05 ENCOUNTER — OFFICE VISIT (OUTPATIENT)
Dept: CARDIOLOGY | Facility: CLINIC | Age: 75
End: 2020-06-05

## 2020-06-05 ENCOUNTER — LAB (OUTPATIENT)
Dept: LAB | Facility: HOSPITAL | Age: 75
End: 2020-06-05

## 2020-06-05 VITALS — HEIGHT: 62 IN | BODY MASS INDEX: 31.28 KG/M2 | WEIGHT: 170 LBS

## 2020-06-05 DIAGNOSIS — I10 ESSENTIAL HYPERTENSION: ICD-10-CM

## 2020-06-05 DIAGNOSIS — I42.8 CARDIOMYOPATHY, NONISCHEMIC (HCC): ICD-10-CM

## 2020-06-05 DIAGNOSIS — Z98.890 H/O TRICUSPID VALVE REPAIR: ICD-10-CM

## 2020-06-05 DIAGNOSIS — Z95.810 BIVENTRICULAR IMPLANTABLE CARDIOVERTER-DEFIBRILLATOR IN SITU: Primary | ICD-10-CM

## 2020-06-05 DIAGNOSIS — Z98.890 S/P MVR (MITRAL VALVE REPAIR): ICD-10-CM

## 2020-06-05 LAB
ANION GAP SERPL CALCULATED.3IONS-SCNC: 10.1 MMOL/L (ref 5–15)
BUN BLD-MCNC: 17 MG/DL (ref 8–23)
BUN/CREAT SERPL: 19.1 (ref 7–25)
CALCIUM SPEC-SCNC: 9.4 MG/DL (ref 8.6–10.5)
CHLORIDE SERPL-SCNC: 101 MMOL/L (ref 98–107)
CO2 SERPL-SCNC: 22.9 MMOL/L (ref 22–29)
CREAT BLD-MCNC: 0.89 MG/DL (ref 0.57–1)
GFR SERPL CREATININE-BSD FRML MDRD: 62 ML/MIN/1.73
GLUCOSE BLD-MCNC: 118 MG/DL (ref 65–99)
POTASSIUM BLD-SCNC: 3.6 MMOL/L (ref 3.5–5.2)
SODIUM BLD-SCNC: 134 MMOL/L (ref 136–145)

## 2020-06-05 PROCEDURE — 80048 BASIC METABOLIC PNL TOTAL CA: CPT

## 2020-06-05 PROCEDURE — 99443 PR PHYS/QHP TELEPHONE EVALUATION 21-30 MIN: CPT | Performed by: INTERNAL MEDICINE

## 2020-06-05 PROCEDURE — 36415 COLL VENOUS BLD VENIPUNCTURE: CPT

## 2020-06-05 NOTE — PROGRESS NOTES
Date of Office Visit: 20  Encounter Provider: Trenton Trinh MD  Place of Service: [facility] CARDIOLOGY  Patient Name: Olivia Addison  :1945  [id number]    Chief Complaint   Patient presents with   • Coronary Artery Disease   :     HPI: Olivia Addison is a 74 y.o. female so this is a telehealth medicine visit that she has consented to.  She wants to follow-up from a recent office visit because of the echo findings she has a lot of concerns and questions about it.  She is getting ready to have abdominal surgery for diverticulitis with Dr. Ezra shin at [facility] and because of that I went ahead and had an echo on her and her LV function had decreased into the 35% range.  Previously it was reported to be normal although it looking at the last echo in its normal was lower limits of normal the 1 2 times ago so a couple years ago was normal LV function.  She has class I heart failure symptoms she gets a little short of breath if she goes up the stairs but otherwise her activities of daily living are good and she does not have heart failure symptoms.  No PND orthopnea edema syncope chest pain.  She also her behavior I do not think it is actually biventricular anymore I think her LV lead is not working.    Past Medical History:   Diagnosis Date   • ADHD (attention deficit hyperactivity disorder)    • Adjustment disorder with anxiety    • Allergic rhinitis    • Arthritis    • Atherosclerosis    • Atrial fibrillation (CMS/HCC)    • Atrophic vaginitis    • Blepharitis of both eyes    • CAD (coronary artery disease)    • Cardiomyopathy (CMS/HCC)    • Cataract    • CHF (congestive heart failure) (CMS/HCC)     CHRONIC SYSTOLIC   • DDD (degenerative disc disease), cervical    • Depression    • Diaphoresis 2019    SEEN AT [facility] UC   • Diverticulitis 2019    SEEN AT [facility] ER   • Diverticulitis 11/15/2016   • Diverticulitis 2014   • Diverticulitis    •  Diverticulitis large intestine 05/23/2017   • Diverticulitis of colon 08/22/2019    ADMITTED TO [facility]   • Dry eye syndrome    • Dyspnea    • Dysuria 06/2019   • Esophageal injury     ESTRELLITA-RAMOS SYNDROME   • Fatigue    • Hematuria 08/2019   • History of alcohol abuse    • Hypercholesterolemia    • Hyperlipidemia    • Hypertension    • Kidney cysts 07/2017    FOLLOWED BY DR. RITU AMOS   • LBBB (left bundle branch block)    • Estrellita-Ramos syndrome    • Memory loss    • NSVT (nonsustained ventricular tachycardia) (CMS/HCC)    • AMADO (obstructive sleep apnea)     USES MOUTHGUARDS   • Patent foramen ovale    • Pneumonia 05/25/2017   • Presbyopia    • PTSD (post-traumatic stress disorder)    • Reactive airway disease    • Regular astigmatism    • RLS (restless legs syndrome)    • S/P TVR (tricuspid valve repair)    • Seborrheic keratosis     FOLLOWED BY DR. HARMAN CÁRDENAS   • Segmental and somatic dysfunction of cervical region    • Segmental and somatic dysfunction of thoracic region    • Vitamin D deficiency    • VT (ventricular tachycardia) (CMS/HCC)        Past Surgical History:   Procedure Laterality Date   • APPENDECTOMY N/A    • BIVENTRICULLAR IMPLANTABLE CARDIOVERTER DEFIBRILLATOR PLACEMENT N/A 04/10/2012    [employer], BI-V, DR. MICKEY MORALES AT [facility]   • BLADDER SUSPENSION N/A    • BREAST CYST ASPIRATION     • BREAST SURGERY Bilateral     REDUCTION MAMMAPLASTY   • CARDIAC CATHETERIZATION Bilateral 03/19/2012    Normal coronary arteries, severe mitral regurgitation and aortic regurgitation. EF was about 30-35%.; DR. JYOTI ROMERO AT [facility]   • CARDIAC ELECTROPHYSIOLOGY PROCEDURE Left 7/11/2016    Procedure: PPM battery change [employer];  Surgeon: Hunter Faust MD;  Location: [facility] INVASIVE LOCATION;  Service:    • CATARACT EXTRACTION     • COLONOSCOPY N/A 10/14/2014    PANDIVERTICULOSIS IN ENTIRE COLON, DR. BRANDEN RUIZ AT [facility]   • COLONOSCOPY N/A 11/13/2019    SCATTERED SMALL AND LARGE DIVERTICULA  IN ENTIRE COLON, RESCOPE IN 10 YRS, DR. PERCY MUHAMMAD AT [facility]   • CORONARY ARTERY BYPASS GRAFT N/A    • ENDOSCOPY N/A 2012    CHAD-RAMOS TEAR WITH ACTIVE BLEEDINGBLOOD THROUGHOUT STOMACH, BLOOD IN DUODENUM, DR. TARUN GILLIS AT [facility]   • HYSTERECTOMY N/A     OLIVIA WITH BSO   • IMPLANTABLE CARDIOVERTER DEFIBRILLATOR LEAD REPLACEMENT/POCKET REVISION N/A 2012    DR. GISELLE BOOGIE AT [facility]   • MITRAL VALVE REPAIR/REPLACEMENT N/A 2012    REMODELING  ANULOPLASTY USING 24 MM BARAJAS ANULOPLASTY RING, MODEL 5200, SERIAL # [id number], DR. VINCE ZAMUDIO AT [facility]   • PATENT FORAMEN OVALE CLOSURE N/A 2012    DR. VINCE ZAMUDIO AT [facility]   • TONSILLECTOMY Bilateral    • TRICUSPID VALVE SURGERY N/A 2012    REMODELING ANULOPLASTY USING 26 MM BARAJAS ANULOPLASTY RING, MODEL 6200, SERIAL # [id number], DR. VINCE ZAMUDIO AT [facility]   • VAGINAL DELIVERY N/A        Social History     Socioeconomic History   • Marital status:      Spouse name: Not on file   • Number of children: Not on file   • Years of education: Not on file   • Highest education level: Not on file   Tobacco Use   • Smoking status: Former Smoker     Packs/day: 0.25     Types: Cigarettes     Last attempt to quit: 1972     Years since quittin.4   • Smokeless tobacco: Never Used   • Tobacco comment: caffine use   Substance and Sexual Activity   • Alcohol use: Yes     Alcohol/week: 7.0 standard drinks     Types: 7 Glasses of wine per week     Comment: MODERATE AMT   • Drug use: No   • Sexual activity: Defer     Birth control/protection: Post-menopausal, Surgical       Family History   Problem Relation Age of Onset   • Diabetes Mother    • Hypertension Mother    • Emphysema Father    • Anxiety disorder Father    • Depression Father    • Hypertension Father    • Stroke Sister    • No Known Problems Brother    • Heart attack Maternal Grandmother    • Sleep apnea Maternal Grandmother    • No Known Problems Maternal Grandfather    •  No Known Problems Paternal Grandmother    • No Known Problems Paternal Grandfather    • No Known Problems Brother    • No Known Problems Daughter        Review of Systems   Constitution: Negative for decreased appetite, fever, malaise/fatigue and weight loss.   HENT: Negative for nosebleeds.    Eyes: Negative for double vision.   Cardiovascular: Negative for chest pain, claudication, cyanosis, dyspnea on exertion, irregular heartbeat, leg swelling, near-syncope, orthopnea, palpitations, paroxysmal nocturnal dyspnea and syncope.   Respiratory: Negative for cough, hemoptysis and shortness of breath.    Hematologic/Lymphatic: Negative for bleeding problem.   Skin: Negative for rash.   Musculoskeletal: Negative for falls and myalgias.   Gastrointestinal: Negative for hematochezia, jaundice, melena, nausea and vomiting.   Genitourinary: Negative for hematuria.   Neurological: Negative for dizziness and seizures.   Psychiatric/Behavioral: Negative for altered mental status and memory loss.       Allergies   Allergen Reactions   • Sulfa Antibiotics Itching and Rash     Hands turned red w/a rash         Current Outpatient Medications:   •  acyclovir (ZOVIRAX) 400 MG tablet, Take 400 mg by mouth 2 (two) times a day. Take no more than 5 doses a day., Disp: , Rfl:   •  atorvastatin (LIPITOR) 40 MG tablet, Take 40 mg by mouth daily., Disp: , Rfl:   •  buPROPion XL (WELLBUTRIN XL) 300 MG 24 hr tablet, Take 300 mg by mouth Daily., Disp: , Rfl:   •  carvedilol (COREG) 25 MG tablet, Take 25 mg by mouth 2 (two) times a day., Disp: , Rfl:   •  furosemide (LASIX) 40 MG tablet, TAKE ONE TABLET BY MOUTH DAILY, Disp: 90 tablet, Rfl: 2  •  ibuprofen (ADVIL,MOTRIN) 600 MG tablet, Take 1 tablet by mouth Every 6 (Six) Hours As Needed for Mild Pain ., Disp: 20 tablet, Rfl: 0  •  losartan (COZAAR) 100 MG tablet, Take 100 mg by mouth Daily., Disp: , Rfl:   •  sertraline (ZOLOFT) 100 MG tablet, Take 100 mg by mouth Daily., Disp: , Rfl: 2  •   "spironolactone (ALDACTONE) 25 MG tablet, Take 1 tablet by mouth Daily., Disp: 90 tablet, Rfl: 3      Objective:     Vitals:    06/05/20 1013   Weight: 77.1 kg (170 lb)   Height: 157.5 cm (62\")     Body mass index is 31.09 kg/m².       Assessment:       Diagnosis Plan   1. Biventricular implantable cardioverter-defibrillator in situ     2. S/P MVR (mitral valve repair)     3. H/O tricuspid valve repair     4. Cardiomyopathy, nonischemic (CMS/HCC)            Plan:       So in the past I was operating under the assumption that she had mitral valve prolapse and it got severe she developed a myopathy from it they repaired her mitral valve myopathy resolved and now I am thinking that may not really be the case.  I think she may have had a nonischemic cardiomyopathy and at one point at least in the chart there is a mention of alcohol abuse I talked with her about that today she is not drinking she has a drink for a couple of months.  I spoke with her about using her sleep apnea device and she has been using that there is no other immediate reversible time but I am now thinking that she has a nonischemic cardiomyopathy and has then developed severe MR they repaired and I am also wondering about whether we need to reexamine whether we need to use a biventricular device she has class I or class II heart failure symptoms if the LV lead had a gave out in the last year or 2 that might explain the drop in her LV function.  She is on good medical therapy and I would not change that.  I will have her come back and see me in 3 months and we will reecho her at that time I have answered all of her questions I spent 30 minutes on the phone with her and another 20 minutes charting    I have looked through her chart with her LV lead on her biventricular pacemaker fractured in 2017 at that time her LV function was normal.  I discussed with the EP service about this kind of bleed that she had would be very difficult to extract and the lead " is already in a good location so it might not have another vein that would work very well if we try to revise it in addition to revision would be very difficult and put her at some risk at this point we will treat her medically see how she does in 3 months the next step would be to try a trial Entresto and then if we just cannot get better then we would consider revising that lead    As always, it has been a pleasure to participate in your patient's care.      Sincerely,       Trenton Trinh MD

## 2020-09-29 ENCOUNTER — TELEPHONE (OUTPATIENT)
Dept: CARDIOLOGY | Facility: CLINIC | Age: 75
End: 2020-09-29

## 2020-09-29 ENCOUNTER — HOSPITAL ENCOUNTER (OUTPATIENT)
Dept: CARDIOLOGY | Facility: HOSPITAL | Age: 75
Discharge: HOME | End: 2020-09-29
Admitting: INTERNAL MEDICINE

## 2020-09-29 VITALS
WEIGHT: 170 LBS | OXYGEN SATURATION: 97 % | HEART RATE: 78 BPM | SYSTOLIC BLOOD PRESSURE: 120 MMHG | DIASTOLIC BLOOD PRESSURE: 70 MMHG | BODY MASS INDEX: 31.28 KG/M2 | HEIGHT: 62 IN

## 2020-09-29 DIAGNOSIS — Z98.890 H/O TRICUSPID VALVE REPAIR: ICD-10-CM

## 2020-09-29 DIAGNOSIS — Z98.890 S/P MVR (MITRAL VALVE REPAIR): ICD-10-CM

## 2020-09-29 DIAGNOSIS — Z95.810 BIVENTRICULAR IMPLANTABLE CARDIOVERTER-DEFIBRILLATOR IN SITU: ICD-10-CM

## 2020-09-29 DIAGNOSIS — I42.8 CARDIOMYOPATHY, NONISCHEMIC (HCC): ICD-10-CM

## 2020-09-29 LAB
AORTIC ARCH: 2.6 CM
ASCENDING AORTA: 3.2 CM
BH CV ECHO MEAS - ACS: 2 CM
BH CV ECHO MEAS - AI DEC SLOPE: 150.9 CM/SEC^2
BH CV ECHO MEAS - AI MAX PG: 43.2 MMHG
BH CV ECHO MEAS - AI MAX VEL: 328.5 CM/SEC
BH CV ECHO MEAS - AI P1/2T: 637.6 MSEC
BH CV ECHO MEAS - AO MAX PG (FULL): 10.6 MMHG
BH CV ECHO MEAS - AO MAX PG: 14.9 MMHG
BH CV ECHO MEAS - AO MEAN PG (FULL): 6.4 MMHG
BH CV ECHO MEAS - AO MEAN PG: 8.5 MMHG
BH CV ECHO MEAS - AO ROOT AREA (BSA CORRECTED): 1.6
BH CV ECHO MEAS - AO ROOT AREA: 6.7 CM^2
BH CV ECHO MEAS - AO ROOT DIAM: 2.9 CM
BH CV ECHO MEAS - AO V2 MAX: 192.7 CM/SEC
BH CV ECHO MEAS - AO V2 MEAN: 135.9 CM/SEC
BH CV ECHO MEAS - AO V2 VTI: 36.2 CM
BH CV ECHO MEAS - ASC AORTA: 3.2 CM
BH CV ECHO MEAS - AVA(I,A): 1.2 CM^2
BH CV ECHO MEAS - AVA(I,D): 1.2 CM^2
BH CV ECHO MEAS - AVA(V,A): 1.3 CM^2
BH CV ECHO MEAS - AVA(V,D): 1.3 CM^2
BH CV ECHO MEAS - BSA(HAYCOCK): 1.9 M^2
BH CV ECHO MEAS - BSA: 1.8 M^2
BH CV ECHO MEAS - BZI_BMI: 31.1 KILOGRAMS/M^2
BH CV ECHO MEAS - BZI_METRIC_HEIGHT: 157.5 CM
BH CV ECHO MEAS - BZI_METRIC_WEIGHT: 77.1 KG
BH CV ECHO MEAS - EDV(MOD-SP2): 82 ML
BH CV ECHO MEAS - EDV(MOD-SP4): 69 ML
BH CV ECHO MEAS - EDV(TEICH): 113.7 ML
BH CV ECHO MEAS - EF(CUBED): 29.9 %
BH CV ECHO MEAS - EF(MOD-BP): 26.5 %
BH CV ECHO MEAS - EF(MOD-SP2): 29.3 %
BH CV ECHO MEAS - EF(MOD-SP4): 26.1 %
BH CV ECHO MEAS - EF(TEICH): 24.2 %
BH CV ECHO MEAS - ESV(MOD-SP2): 58 ML
BH CV ECHO MEAS - ESV(MOD-SP4): 51 ML
BH CV ECHO MEAS - ESV(TEICH): 86.2 ML
BH CV ECHO MEAS - FS: 11.2 %
BH CV ECHO MEAS - IVS/LVPW: 0.98
BH CV ECHO MEAS - IVSD: 1.2 CM
BH CV ECHO MEAS - LAT PEAK E' VEL: 3.6 CM/SEC
BH CV ECHO MEAS - LV DIASTOLIC VOL/BSA (35-75): 38.7 ML/M^2
BH CV ECHO MEAS - LV MASS(C)D: 228.6 GRAMS
BH CV ECHO MEAS - LV MASS(C)DI: 128.1 GRAMS/M^2
BH CV ECHO MEAS - LV MAX PG: 4.2 MMHG
BH CV ECHO MEAS - LV MEAN PG: 2.2 MMHG
BH CV ECHO MEAS - LV SYSTOLIC VOL/BSA (12-30): 28.6 ML/M^2
BH CV ECHO MEAS - LV V1 MAX: 102.8 CM/SEC
BH CV ECHO MEAS - LV V1 MEAN: 67.7 CM/SEC
BH CV ECHO MEAS - LV V1 VTI: 18.6 CM
BH CV ECHO MEAS - LVIDD: 4.9 CM
BH CV ECHO MEAS - LVIDS: 4.4 CM
BH CV ECHO MEAS - LVLD AP2: 6.7 CM
BH CV ECHO MEAS - LVLD AP4: 5.4 CM
BH CV ECHO MEAS - LVLS AP2: 5.6 CM
BH CV ECHO MEAS - LVLS AP4: 6.9 CM
BH CV ECHO MEAS - LVOT AREA (M): 2.5 CM^2
BH CV ECHO MEAS - LVOT AREA: 2.4 CM^2
BH CV ECHO MEAS - LVOT DIAM: 1.8 CM
BH CV ECHO MEAS - LVPWD: 1.2 CM
BH CV ECHO MEAS - MED PEAK E' VEL: 4.4 CM/SEC
BH CV ECHO MEAS - MR MAX PG: 36.3 MMHG
BH CV ECHO MEAS - MR MAX VEL: 301.3 CM/SEC
BH CV ECHO MEAS - MV A DUR: 0.11 SEC
BH CV ECHO MEAS - MV A MAX VEL: 136.6 CM/SEC
BH CV ECHO MEAS - MV DEC SLOPE: 432.4 CM/SEC^2
BH CV ECHO MEAS - MV DEC TIME: 0.29 SEC
BH CV ECHO MEAS - MV E MAX VEL: 120 CM/SEC
BH CV ECHO MEAS - MV E/A: 0.88
BH CV ECHO MEAS - MV MAX PG: 8.1 MMHG
BH CV ECHO MEAS - MV MEAN PG: 4.3 MMHG
BH CV ECHO MEAS - MV P1/2T MAX VEL: 129.1 CM/SEC
BH CV ECHO MEAS - MV P1/2T: 87.4 MSEC
BH CV ECHO MEAS - MV V2 MAX: 142.4 CM/SEC
BH CV ECHO MEAS - MV V2 MEAN: 98.6 CM/SEC
BH CV ECHO MEAS - MV V2 VTI: 48.5 CM
BH CV ECHO MEAS - MVA P1/2T LCG: 1.7 CM^2
BH CV ECHO MEAS - MVA(P1/2T): 2.5 CM^2
BH CV ECHO MEAS - MVA(VTI): 0.93 CM^2
BH CV ECHO MEAS - PA ACC TIME: 0.08 SEC
BH CV ECHO MEAS - PA MAX PG (FULL): 0.49 MMHG
BH CV ECHO MEAS - PA MAX PG: 3.5 MMHG
BH CV ECHO MEAS - PA PR(ACCEL): 41 MMHG
BH CV ECHO MEAS - PA V2 MAX: 93.7 CM/SEC
BH CV ECHO MEAS - PULM A REVS DUR: 0.1 SEC
BH CV ECHO MEAS - PULM A REVS VEL: 26.2 CM/SEC
BH CV ECHO MEAS - PULM DIAS VEL: 34.6 CM/SEC
BH CV ECHO MEAS - PULM S/D: 0.94
BH CV ECHO MEAS - PULM SYS VEL: 32.5 CM/SEC
BH CV ECHO MEAS - PVA(V,A): 3.2 CM^2
BH CV ECHO MEAS - PVA(V,D): 3.2 CM^2
BH CV ECHO MEAS - QP/QS: 0.84
BH CV ECHO MEAS - RAP SYSTOLE: 3 MMHG
BH CV ECHO MEAS - RV MAX PG: 3 MMHG
BH CV ECHO MEAS - RV MEAN PG: 1.9 MMHG
BH CV ECHO MEAS - RV V1 MAX: 86.9 CM/SEC
BH CV ECHO MEAS - RV V1 MEAN: 65 CM/SEC
BH CV ECHO MEAS - RV V1 VTI: 11 CM
BH CV ECHO MEAS - RVOT AREA: 3.5 CM^2
BH CV ECHO MEAS - RVOT DIAM: 2.1 CM
BH CV ECHO MEAS - RVSP: 23 MMHG
BH CV ECHO MEAS - SI(AO): 135.6 ML/M^2
BH CV ECHO MEAS - SI(CUBED): 19.9 ML/M^2
BH CV ECHO MEAS - SI(LVOT): 25.3 ML/M^2
BH CV ECHO MEAS - SI(MOD-SP2): 13.5 ML/M^2
BH CV ECHO MEAS - SI(MOD-SP4): 10.1 ML/M^2
BH CV ECHO MEAS - SI(TEICH): 15.4 ML/M^2
BH CV ECHO MEAS - SUP REN AO DIAM: 2 CM
BH CV ECHO MEAS - SV(AO): 241.9 ML
BH CV ECHO MEAS - SV(CUBED): 35.5 ML
BH CV ECHO MEAS - SV(LVOT): 45.1 ML
BH CV ECHO MEAS - SV(MOD-SP2): 24 ML
BH CV ECHO MEAS - SV(MOD-SP4): 18 ML
BH CV ECHO MEAS - SV(RVOT): 38 ML
BH CV ECHO MEAS - SV(TEICH): 27.5 ML
BH CV ECHO MEAS - TAPSE (>1.6): 1.8 CM
BH CV ECHO MEAS - TR MAX VEL: 222.6 CM/SEC
BH CV ECHO MEAS - TV A MAX VEL: 126.1 CM/SEC
BH CV ECHO MEAS - TV E MAX VEL: 106 CM/SEC
BH CV ECHO MEAS - TV E/A: 0.84
BH CV ECHO MEASUREMENTS AVERAGE E/E' RATIO: 30
BH CV XLRA - RV BASE: 3.3 CM
BH CV XLRA - RV LENGTH: 6.8 CM
BH CV XLRA - RV MID: 4 CM
BH CV XLRA - TDI S': 9.7 CM/SEC
LEFT ATRIUM VOLUME INDEX: 33 ML/M2
LV EF 2D ECHO EST: 27 %
MAXIMAL PREDICTED HEART RATE: 146 BPM
SINUS: 3 CM
STJ: 3 CM
STRESS TARGET HR: 124 BPM
TV MEAN GRADIENT: 3 MMHG
TV PEAK GRADIENT: 7 MMHG
TV VTI: 39 CM

## 2020-09-29 PROCEDURE — 93306 TTE W/DOPPLER COMPLETE: CPT | Performed by: INTERNAL MEDICINE

## 2020-09-29 PROCEDURE — 93306 TTE W/DOPPLER COMPLETE: CPT

## 2020-09-29 PROCEDURE — 25010000002 PERFLUTREN (DEFINITY) 8.476 MG IN SODIUM CHLORIDE 0.9 % 10 ML INJECTION: Performed by: INTERNAL MEDICINE

## 2020-09-29 RX ADMIN — PERFLUTREN 1.5 ML: 6.52 INJECTION, SUSPENSION INTRAVENOUS at 12:19

## 2020-09-29 NOTE — TELEPHONE ENCOUNTER
See below. When you get a chance can you get her set up for an appt when you get a chance.    Thanks,  Lidia

## 2020-09-29 NOTE — TELEPHONE ENCOUNTER
Pt stopped by the front after her echo appt. She would like to speak with you about her echo results. She would like for you to call her today if you can.  She can be reached at #[phone].    Thanks,  Lidia

## 2020-10-01 ENCOUNTER — TELEPHONE (OUTPATIENT)
Dept: CARDIOLOGY | Facility: CLINIC | Age: 75
End: 2020-10-01

## 2020-10-01 NOTE — TELEPHONE ENCOUNTER
Ms. Addison called wanting to know what the name of the medication you discussed with her regarding prolonged life. She wants to research the medication.    Thank you,  Destiny Jonas RN  [facility]  Triage

## 2020-10-08 ENCOUNTER — OFFICE VISIT (OUTPATIENT)
Dept: CARDIOLOGY | Facility: CLINIC | Age: 75
End: 2020-10-08

## 2020-10-08 VITALS
HEIGHT: 62 IN | OXYGEN SATURATION: 97 % | BODY MASS INDEX: 30.91 KG/M2 | SYSTOLIC BLOOD PRESSURE: 126 MMHG | DIASTOLIC BLOOD PRESSURE: 80 MMHG | WEIGHT: 168 LBS | HEART RATE: 71 BPM

## 2020-10-08 DIAGNOSIS — Z95.810 BIVENTRICULAR IMPLANTABLE CARDIOVERTER-DEFIBRILLATOR IN SITU: ICD-10-CM

## 2020-10-08 DIAGNOSIS — I42.8 CARDIOMYOPATHY, NONISCHEMIC (HCC): Primary | ICD-10-CM

## 2020-10-08 PROCEDURE — 93000 ELECTROCARDIOGRAM COMPLETE: CPT | Performed by: NURSE PRACTITIONER

## 2020-10-08 PROCEDURE — 99214 OFFICE O/P EST MOD 30 MIN: CPT | Performed by: NURSE PRACTITIONER

## 2020-10-08 RX ORDER — SACUBITRIL AND VALSARTAN 24; 26 MG/1; MG/1
1 TABLET, FILM COATED ORAL 2 TIMES DAILY
Qty: 60 TABLET | Refills: 11 | Status: SHIPPED | OUTPATIENT
Start: 2020-10-08 | End: 2020-10-27 | Stop reason: SDUPTHER

## 2020-10-08 NOTE — PROGRESS NOTES
Date of Office Visit: 10/08/2020  Encounter Provider: HUSSEIN Almanzar  Place of Service: [facility] CARDIOLOGY  Patient Name: Olivia Addison  :1945    Chief Complaint   Patient presents with   • Hypertension     2 month follow up   :     HPI: Olivia Addison is a 75 y.o. female to me, who presents today for follow-up.  Old records have been obtained and reviewed by me.  She is a patient of Dr. Trinh's with a past cardiac history significant for cardiomyopathy, mitral and tricuspid valve repair, and biventricular pacemaker implantation.  Of note, ne of her pacemaker leads is not working.  Following valve surgery, she had complete recovery of her LV function.  However, in May of this year, she underwent an echocardiogram in preparation for upcoming surgery which revealed an EF of 34%.     She was last seen in the office by Dr. Trinh on 2020 at which time she reported class I heart failure symptoms including shortness of breath with going up the stairs but otherwise felt well.  Dr. Trinh wondered if the possible cause of the drop in LV function was related to the fact that she was not BiV pacing.  She was already on good medical therapy.  No changes were made to her medical regimen, and she was advised to follow-up in 3 months with a repeat echocardiogram.  Repeat echocardiogram revealed moderately decreased LV function with an EF of 27%.     Overall she feels well.  She has shortness of breath with climbing stairs but denies any PND or orthopnea.  Her weight is stable.  She denies any chest pain, edema, palpitations, dizziness, or syncope.  She has occasional sporadic pain in the center of her chest that is random and short in duration.  She walks for exercise.  She has very emotional about her diagnosis and very tearful in the room.      Past Medical History:   Diagnosis Date   • ADHD (attention deficit hyperactivity disorder)    • Adjustment disorder with  anxiety    • Allergic rhinitis    • Arthritis    • Atherosclerosis    • Atrial fibrillation (CMS/HCC)    • Atrophic vaginitis    • Blepharitis of both eyes    • CAD (coronary artery disease)    • Cardiomyopathy (CMS/HCC)    • Cataract    • CHF (congestive heart failure) (CMS/HCC)     CHRONIC SYSTOLIC   • DDD (degenerative disc disease), cervical    • Depression    • Diaphoresis 05/23/2019    SEEN AT [facility] UC   • Diverticulitis 09/02/2019    SEEN AT [facility] ER   • Diverticulitis 11/15/2016   • Diverticulitis 09/07/2014   • Diverticulitis    • Diverticulitis large intestine 05/23/2017   • Diverticulitis of colon 08/22/2019    ADMITTED TO [facility]   • Dry eye syndrome    • Dyspnea    • Dysuria 06/2019   • Esophageal injury     ESTRELLITA-RAMOS SYNDROME   • Fatigue    • Hematuria 08/2019   • History of alcohol abuse    • Hypercholesterolemia    • Hyperlipidemia    • Hypertension    • Kidney cysts 07/2017    FOLLOWED BY DR. RITU AMOS   • LBBB (left bundle branch block)    • Estrellita-Ramos syndrome    • Memory loss    • NSVT (nonsustained ventricular tachycardia) (CMS/HCC)    • AMADO (obstructive sleep apnea)     USES MOUTHGUARDS   • Patent foramen ovale    • Pneumonia 05/25/2017   • Presbyopia    • PTSD (post-traumatic stress disorder)    • Reactive airway disease    • Regular astigmatism    • RLS (restless legs syndrome)    • S/P TVR (tricuspid valve repair)    • Seborrheic keratosis     FOLLOWED BY DR. HARMAN CÁRDENAS   • Segmental and somatic dysfunction of cervical region    • Segmental and somatic dysfunction of thoracic region    • Vitamin D deficiency    • VT (ventricular tachycardia) (CMS/HCC)        Past Surgical History:   Procedure Laterality Date   • APPENDECTOMY N/A    • BIVENTRICULLAR IMPLANTABLE CARDIOVERTER DEFIBRILLATOR PLACEMENT N/A 04/10/2012    [employer], BI-V, DR. MICKEY MORALES AT [facility]   • BLADDER SUSPENSION N/A    • BREAST CYST ASPIRATION     • BREAST SURGERY Bilateral     REDUCTION MAMMAPLASTY   • CARDIAC  CATHETERIZATION Bilateral 2012    Normal coronary arteries, severe mitral regurgitation and aortic regurgitation. EF was about 30-35%.; DR. JYOTI ROMERO AT [facility]   • CARDIAC ELECTROPHYSIOLOGY PROCEDURE Left 2016    Procedure: PPM battery change [employer];  Surgeon: Hunter Faust MD;  Location: [facility] INVASIVE LOCATION;  Service:    • CATARACT EXTRACTION     • COLONOSCOPY N/A 10/14/2014    PANDIVERTICULOSIS IN ENTIRE COLON, DR. BRANDEN RUIZ AT [facility]   • COLONOSCOPY N/A 2019    SCATTERED SMALL AND LARGE DIVERTICULA IN ENTIRE COLON, RESCOPE IN 10 YRS, DR. PERCY MUHAMMAD AT [facility]   • CORONARY ARTERY BYPASS GRAFT N/A    • ENDOSCOPY N/A 2012    CHAD-RAMOS TEAR WITH ACTIVE BLEEDINGBLOOD THROUGHOUT STOMACH, BLOOD IN DUODENUM, DR. TARUN GILLIS AT [facility]   • HYSTERECTOMY N/A     [facility] WITH BSO   • IMPLANTABLE CARDIOVERTER DEFIBRILLATOR LEAD REPLACEMENT/POCKET REVISION N/A 2012    DR. GISELLE BOOGIE AT [facility]   • MITRAL VALVE REPAIR/REPLACEMENT N/A 2012    REMODELING  ANULOPLASTY USING 24 MM BARAJAS ANULOPLASTY RING, MODEL 5200, SERIAL # [id number], DR. VINCE ZAMUDIO AT [facility]   • PATENT FORAMEN OVALE CLOSURE N/A 2012    DR. VINCE ZAMUDIO AT [facility]   • TONSILLECTOMY Bilateral    • TRICUSPID VALVE SURGERY N/A 2012    REMODELING ANULOPLASTY USING 26 MM BARAJAS ANULOPLASTY RING, MODEL 6200, SERIAL # [id number], DR. VINCE ZAMUDIO AT [facility]   • VAGINAL DELIVERY N/A        Social History     Socioeconomic History   • Marital status:      Spouse name: Not on file   • Number of children: Not on file   • Years of education: Not on file   • Highest education level: Not on file   Tobacco Use   • Smoking status: Former Smoker     Packs/day: 0.25     Types: Cigarettes     Quit date: 1972     Years since quittin.8   • Smokeless tobacco: Never Used   • Tobacco comment: caffine use   Substance and Sexual Activity   • Alcohol use: Yes     Alcohol/week: 7.0 standard drinks      Types: 7 Glasses of wine per week     Comment: MODERATE AMT   • Drug use: No   • Sexual activity: Defer     Birth control/protection: Post-menopausal, Surgical       Family History   Problem Relation Age of Onset   • Diabetes Mother    • Hypertension Mother    • Emphysema Father    • Anxiety disorder Father    • Depression Father    • Hypertension Father    • Stroke Sister    • No Known Problems Brother    • Heart attack Maternal Grandmother    • Sleep apnea Maternal Grandmother    • No Known Problems Maternal Grandfather    • No Known Problems Paternal Grandmother    • No Known Problems Paternal Grandfather    • No Known Problems Brother    • No Known Problems Daughter        Review of Systems   Constitution: Negative for chills, fever and malaise/fatigue.   Cardiovascular: Positive for dyspnea on exertion. Negative for chest pain, leg swelling, near-syncope, orthopnea, palpitations, paroxysmal nocturnal dyspnea and syncope.   Respiratory: Negative for cough and shortness of breath.    Musculoskeletal: Negative for joint pain, joint swelling and myalgias.   Gastrointestinal: Negative for abdominal pain, diarrhea, melena, nausea and vomiting.   Genitourinary: Negative for frequency and hematuria.   Neurological: Negative for light-headedness, numbness, paresthesias and seizures.   Allergic/Immunologic: Negative.    All other systems reviewed and are negative.      Allergies   Allergen Reactions   • Sulfa Antibiotics Itching and Rash     Hands turned red w/a rash         Current Outpatient Medications:   •  acyclovir (ZOVIRAX) 400 MG tablet, Take 400 mg by mouth 2 (two) times a day. Take no more than 5 doses a day., Disp: , Rfl:   •  atorvastatin (LIPITOR) 40 MG tablet, Take 40 mg by mouth daily., Disp: , Rfl:   •  buPROPion XL (WELLBUTRIN XL) 300 MG 24 hr tablet, Take 300 mg by mouth Daily., Disp: , Rfl:   •  carvedilol (COREG) 25 MG tablet, Take 25 mg by mouth 2 (two) times a day., Disp: , Rfl:   •  furosemide  "(LASIX) 40 MG tablet, TAKE ONE TABLET BY MOUTH DAILY, Disp: 90 tablet, Rfl: 2  •  Multiple Vitamins-Minerals (MULTIVITAMIN ADULT PO), , Disp: , Rfl:   •  sertraline (ZOLOFT) 100 MG tablet, Take 100 mg by mouth Daily., Disp: , Rfl: 2  •  spironolactone (ALDACTONE) 25 MG tablet, Take 1 tablet by mouth Daily., Disp: 90 tablet, Rfl: 3  •  sacubitril-valsartan (Entresto) 24-26 MG tablet, Take 1 tablet by mouth 2 (Two) Times a Day., Disp: 60 tablet, Rfl: 11      Objective:     Vitals:    10/08/20 1506 10/08/20 1511   BP: 124/78 126/80   BP Location: Right arm Left arm   Pulse: 71    SpO2: 97%    Weight: 77.1 kg (170 lb) 76.2 kg (168 lb)   Height: 157.5 cm (62\")      Body mass index is 30.73 kg/m².    PHYSICAL EXAM:    Constitutional:       General: Not in acute distress.     Appearance: Well-developed. Not diaphoretic.   Eyes:      Pupils: Pupils are equal, round, and reactive to light.   HENT:      Head: Normocephalic and atraumatic.   Neck:      Thyroid: No thyromegaly.      Vascular: No JVD.   Pulmonary:      Effort: Pulmonary effort is normal. No respiratory distress.      Breath sounds: Normal breath sounds.   Cardiovascular:      Normal rate. Regular rhythm.   Pulses:     Intact distal pulses.   Abdominal:      General: Bowel sounds are normal. There is no distension.      Palpations: Abdomen is soft. There is no hepatomegaly or splenomegaly.      Tenderness: There is no abdominal tenderness.   Musculoskeletal: Normal range of motion.   Skin:     General: Skin is warm and dry.      Findings: No erythema.   Neurological:      Mental Status: Alert and oriented to person, place, and time.   Psychiatric:         Behavior: Behavior normal.         Judgment: Judgment normal.           ECG 12 Lead    Date/Time: 10/8/2020 3:21 PM  Performed by: Lenora Madden APRN  Authorized by: Lenora Madden APRN   Comparison: compared with previous ECG from 5/22/2020  Similar to previous ECG  Rhythm: sinus rhythm  Rate: " normal  BPM: 68  Conduction: left bundle branch block    Clinical impression: abnormal EKG  Comments: Indication: Cardiomyopathy              Assessment:       Diagnosis Plan   1. Cardiomyopathy, nonischemic (CMS/HCC)  ECG 12 Lead    Basic Metabolic Panel   2. Biventricular implantable cardioverter-defibrillator in situ       Orders Placed This Encounter   Procedures   • Basic Metabolic Panel     Standing Status:   Future     Standing Expiration Date:   10/8/2021   • ECG 12 Lead     This order was created via procedure documentation          Plan:       Overall I think she is stable.  She denies any symptoms of angina or heart failure.  The random and sporadic pain in her chest does not sound cardiac.  It is very atypical.  She has moderately decreased LV function with an EF of 27%.  She is very tearful about this diagnosis.  I did reassure her that we should try and focus on the fact that she is feeling well.  I discussed with her the importance of preventing heart failure exacerbations including limiting her sodium intake, use of NSAIDs, and use of alcohol.  We are going to try and treat her medically.  I am going to stop the losartan and instead start her on Entresto 24-26 mg twice daily.  She will have a BMP in 2 weeks.  If medical management is unsuccessful, we will consider a lead revision on her biventricular device.  She will follow-up with me in 3 months.      As always, it has been a pleasure to participate in your patient's care.      Sincerely,         HUSSEIN Negron

## 2020-10-23 ENCOUNTER — TELEPHONE (OUTPATIENT)
Dept: CARDIOLOGY | Facility: CLINIC | Age: 75
End: 2020-10-23

## 2020-10-23 ENCOUNTER — LAB (OUTPATIENT)
Dept: LAB | Facility: HOSPITAL | Age: 75
End: 2020-10-23

## 2020-10-23 DIAGNOSIS — I42.8 CARDIOMYOPATHY, NONISCHEMIC (HCC): ICD-10-CM

## 2020-10-23 LAB
ANION GAP SERPL CALCULATED.3IONS-SCNC: 10.5 MMOL/L (ref 5–15)
BUN SERPL-MCNC: 24 MG/DL (ref 8–23)
BUN/CREAT SERPL: 27.3 (ref 7–25)
CALCIUM SPEC-SCNC: 9.4 MG/DL (ref 8.6–10.5)
CHLORIDE SERPL-SCNC: 103 MMOL/L (ref 98–107)
CO2 SERPL-SCNC: 26.5 MMOL/L (ref 22–29)
CREAT SERPL-MCNC: 0.88 MG/DL (ref 0.57–1)
GFR SERPL CREATININE-BSD FRML MDRD: 63 ML/MIN/1.73
GLUCOSE SERPL-MCNC: 120 MG/DL (ref 65–99)
POTASSIUM SERPL-SCNC: 4.1 MMOL/L (ref 3.5–5.2)
SODIUM SERPL-SCNC: 140 MMOL/L (ref 136–145)

## 2020-10-23 PROCEDURE — 36415 COLL VENOUS BLD VENIPUNCTURE: CPT

## 2020-10-23 PROCEDURE — 80048 BASIC METABOLIC PNL TOTAL CA: CPT

## 2020-10-23 NOTE — TELEPHONE ENCOUNTER
[phone]    Pt L/M stating that she will still have a $300 Co-pay after insurance. Pt can't afford this.   Please advise of recommendations.    OG Hdez

## 2020-10-26 NOTE — TELEPHONE ENCOUNTER
Is this for the Entresto?  Can we see if she would qualify for patient assistance?  If not, lets try to provide her with samples when she needs them.    Also, please let her know her lab work is stable.

## 2020-10-27 RX ORDER — SACUBITRIL AND VALSARTAN 24; 26 MG/1; MG/1
1 TABLET, FILM COATED ORAL 2 TIMES DAILY
Qty: 60 TABLET | Refills: 0 | Status: SHIPPED | OUTPATIENT
Start: 2020-10-27 | End: 2023-03-08 | Stop reason: SDUPTHER

## 2020-10-27 NOTE — TELEPHONE ENCOUNTER
This is for pt's 30 day free card for Entresto 24-26 mg BID. Please review and sign. It will be sent to printer.    OG Hdez

## 2020-12-10 ENCOUNTER — TELEPHONE (OUTPATIENT)
Dept: CARDIOLOGY | Facility: CLINIC | Age: 75
End: 2020-12-10

## 2020-12-10 NOTE — TELEPHONE ENCOUNTER
Pt L/M stating [employer] called her and stated they needed clarification on Entresto Rx and the name of the Supervising MD and couldn't fill Rx until this was called or faxed in.     I s/w [employer] pt assist pharm and they asked for Supervising MD over HUSSEIN Cooley. Due to [place] state regulations, Rx's require an MD on file. I gave Dr. Trinh's info, as this is also his pt. We also clarified that pt would prefer a 90 day supply w/ 3 refills giving pt a yrs worth. [facility] pharmacist verbalized all understanding and mad this a STAT order.     I also advised pt of this info. Pt verbalized most gratitude for all my work on this for her.    LEONELA Cooley.......    OG Hdez

## 2020-12-16 NOTE — TELEPHONE ENCOUNTER
Entresto Approved. Approved through 11/24/2021.    Faxed to pharm.    Pt advised of approval. Pt verbalized appreciation.    OG Hdez

## 2021-01-14 ENCOUNTER — OFFICE VISIT (OUTPATIENT)
Dept: CARDIOLOGY | Facility: CLINIC | Age: 76
End: 2021-01-14

## 2021-01-14 VITALS
DIASTOLIC BLOOD PRESSURE: 92 MMHG | WEIGHT: 174.4 LBS | HEART RATE: 68 BPM | BODY MASS INDEX: 32.09 KG/M2 | HEIGHT: 62 IN | SYSTOLIC BLOOD PRESSURE: 128 MMHG

## 2021-01-14 DIAGNOSIS — Z98.890 S/P MVR (MITRAL VALVE REPAIR): ICD-10-CM

## 2021-01-14 DIAGNOSIS — I42.8 CARDIOMYOPATHY, NONISCHEMIC (HCC): Primary | ICD-10-CM

## 2021-01-14 DIAGNOSIS — Z98.890 H/O TRICUSPID VALVE REPAIR: ICD-10-CM

## 2021-01-14 DIAGNOSIS — I44.7 LBBB (LEFT BUNDLE BRANCH BLOCK): ICD-10-CM

## 2021-01-14 DIAGNOSIS — Z95.810 BIVENTRICULAR IMPLANTABLE CARDIOVERTER-DEFIBRILLATOR IN SITU: ICD-10-CM

## 2021-01-14 PROCEDURE — 93000 ELECTROCARDIOGRAM COMPLETE: CPT | Performed by: NURSE PRACTITIONER

## 2021-01-14 PROCEDURE — 99214 OFFICE O/P EST MOD 30 MIN: CPT | Performed by: NURSE PRACTITIONER

## 2021-01-14 NOTE — PROGRESS NOTES
Date of Office Visit: 2021  Encounter Provider: HUSSEIN Almanzar  Place of Service: [facility] CARDIOLOGY  Patient Name: Olivia Addison  :1945    Chief Complaint   Patient presents with   • Cardiac Valve Problem   • Hypertension   • Coronary Artery Disease   :     HPI: Olivia Addison is a 75 y.o. female who presents today for follow-up.  Old records have been obtained and reviewed by me.  She is a patient of Dr. Trinh's with a past cardiac history significant for nonischemic cardiomyopathy status post biventricular CRT-D and mitral and tricuspid valve repair.  Following her valve surgery, she had complete recovery of her LV function.  However, in May 2020, she underwent an echocardiogram in preparation for surgery which revealed an EF of 34%.  It was felt that possibly the reason for her drop in LV function was related to the fact that one of her pacemaker leads was not working and she was not BiV pacing.  Medical therapy was recommended.  Her last echocardiogram in 2020 revealed an EF of 27%, mild aortic regurgitation, mild mitral regurgitation, and mild tricuspid regurgitation.   I last saw her in the office on 10/8/2020 at which time she was overall feeling well.  She denied any symptoms of angina or heart failure.  However, she was very emotional about her diagnosis.  [facility].  If medical management remained unsuccessful, we would consider a lead revision of her biventricular device.  She was advised to follow-up in 3 months.   Overall she feels well.  She denies any chest pain, palpitations, edema, dizziness, or syncope.  She only has shortness of breath when climbing stairs.  She denies any PND or orthopnea.  She attributes all of her cardiac issues to taking Fen/Phen back in the 90s.  She is very regretful of this decision.  She is requesting a repeat echocardiogram since starting the  Entresto.  She is a retired teacher.    Past Medical History:   Diagnosis Date   • ADHD (attention deficit hyperactivity disorder)    • Adjustment disorder with anxiety    • Allergic rhinitis    • Arthritis    • Atherosclerosis    • Atrial fibrillation (CMS/HCC)    • Atrophic vaginitis    • Blepharitis of both eyes    • CAD (coronary artery disease)    • Cardiomyopathy (CMS/HCC)    • Cataract    • CHF (congestive heart failure) (CMS/HCC)     CHRONIC SYSTOLIC   • DDD (degenerative disc disease), cervical    • Depression    • Diaphoresis 05/23/2019    SEEN AT [facility]   • Diverticulitis 09/02/2019    SEEN AT [facility] ER   • Diverticulitis 11/15/2016   • Diverticulitis 09/07/2014   • Diverticulitis    • Diverticulitis large intestine 05/23/2017   • Diverticulitis of colon 08/22/2019    ADMITTED TO [facility]   • Dry eye syndrome    • Dyspnea    • Dysuria 06/2019   • Esophageal injury     ESTRELLITA-BISWAS SYNDROME   • Fatigue    • Hematuria 08/2019   • History of alcohol abuse    • Hypercholesterolemia    • Hyperlipidemia    • Hypertension    • Kidney cysts 07/2017    FOLLOWED BY DR. RITU AMOS   • LBBB (left bundle branch block)    • Estrellita-Biswas syndrome    • Memory loss    • NSVT (nonsustained ventricular tachycardia) (CMS/HCC)    • AMADO (obstructive sleep apnea)     USES MOUTHGUARDS   • Patent foramen ovale    • Pneumonia 05/25/2017   • Presbyopia    • PTSD (post-traumatic stress disorder)    • Reactive airway disease    • Regular astigmatism    • RLS (restless legs syndrome)    • S/P TVR (tricuspid valve repair)    • Seborrheic keratosis     FOLLOWED BY DR. HARMAN CÁRDENAS   • Segmental and somatic dysfunction of cervical region    • Segmental and somatic dysfunction of thoracic region    • Vitamin D deficiency    • VT (ventricular tachycardia) (CMS/HCC)        Past Surgical History:   Procedure Laterality Date   • APPENDECTOMY N/A    • BIVENTRICULLAR IMPLANTABLE CARDIOVERTER DEFIBRILLATOR PLACEMENT N/A 04/10/2012    [employer],  BI-V, DR. MICKEY MORALES AT [facility]   • BLADDER SUSPENSION N/A    • BREAST CYST ASPIRATION     • BREAST SURGERY Bilateral     REDUCTION MAMMAPLASTY   • CARDIAC CATHETERIZATION Bilateral 03/19/2012    Normal coronary arteries, severe mitral regurgitation and aortic regurgitation. EF was about 30-35%.; DR. JYOTI ROMERO AT [facility]   • CARDIAC ELECTROPHYSIOLOGY PROCEDURE Left 7/11/2016    Procedure: PPM battery change [employer];  Surgeon: Hunter Faust MD;  Location: [facility] INVASIVE LOCATION;  Service:    • CATARACT EXTRACTION     • COLONOSCOPY N/A 10/14/2014    PANDIVERTICULOSIS IN ENTIRE COLON, DR. BRANDEN RUIZ AT [facility]   • COLONOSCOPY N/A 11/13/2019    SCATTERED SMALL AND LARGE DIVERTICULA IN ENTIRE COLON, RESCOPE IN 10 YRS, DR. PERCY MUHAMMAD AT [facility]   • CORONARY ARTERY BYPASS GRAFT N/A    • ENDOSCOPY N/A 06/06/2012    CHAD-RAMOS TEAR WITH ACTIVE BLEEDINGBLOOD THROUGHOUT STOMACH, BLOOD IN DUODENUM, DR. TARUN GILLIS AT [facility]   • HYSTERECTOMY N/A 1980    OLIVIA WITH BSO   • IMPLANTABLE CARDIOVERTER DEFIBRILLATOR LEAD REPLACEMENT/POCKET REVISION N/A 08/28/2012    DR. GISELLE BOOGIE AT [facility]   • MITRAL VALVE REPAIR/REPLACEMENT N/A 06/06/2012    REMODELING  ANULOPLASTY USING 24 MM BARAJAS ANULOPLASTY RING, MODEL 5200, SERIAL # [id number], DR. VINCE ZAMUDIO AT [facility]   • PATENT FORAMEN OVALE CLOSURE N/A 06/06/2012    DR. VINCE ZAMUDIO AT [facility]   • TONSILLECTOMY Bilateral    • TRICUSPID VALVE SURGERY N/A 06/06/2012    REMODELING ANULOPLASTY USING 26 MM BARAJAS ANULOPLASTY RING, MODEL 6200, SERIAL # [id number], DR. VINCE ZAMUDIO AT [facility]   • VAGINAL DELIVERY N/A 1974       Social History     Socioeconomic History   • Marital status:      Spouse name: Not on file   • Number of children: Not on file   • Years of education: Not on file   • Highest education level: Not on file   Tobacco Use   • Smoking status: Never Smoker   • Smokeless tobacco: Never Used   • Tobacco comment: caffine use   Substance and Sexual  Activity   • Alcohol use: Yes     Alcohol/week: 7.0 standard drinks     Types: 7 Glasses of wine per week     Comment: MODERATE AMT   • Drug use: No   • Sexual activity: Defer     Birth control/protection: Post-menopausal, Surgical       Family History   Problem Relation Age of Onset   • Diabetes Mother    • Hypertension Mother    • Emphysema Father    • Anxiety disorder Father    • Depression Father    • Hypertension Father    • Stroke Sister    • No Known Problems Brother    • Heart attack Maternal Grandmother    • Sleep apnea Maternal Grandmother    • No Known Problems Maternal Grandfather    • No Known Problems Paternal Grandmother    • No Known Problems Paternal Grandfather    • No Known Problems Brother    • No Known Problems Daughter        Review of Systems   Constitution: Negative.   Cardiovascular: Positive for dyspnea on exertion. Negative for chest pain, leg swelling, orthopnea, paroxysmal nocturnal dyspnea and syncope.   Respiratory: Negative.    Hematologic/Lymphatic: Negative for bleeding problem.   Musculoskeletal: Negative for falls.   Gastrointestinal: Negative for melena.   Neurological: Negative for dizziness and light-headedness.       Allergies   Allergen Reactions   • Sulfa Antibiotics Itching and Rash     Hands turned red w/a rash         Current Outpatient Medications:   •  acyclovir (ZOVIRAX) 400 MG tablet, Take 400 mg by mouth 2 (two) times a day. Take no more than 5 doses a day., Disp: , Rfl:   •  atorvastatin (LIPITOR) 40 MG tablet, Take 40 mg by mouth daily., Disp: , Rfl:   •  buPROPion XL (WELLBUTRIN XL) 300 MG 24 hr tablet, Take 300 mg by mouth Daily., Disp: , Rfl:   •  carvedilol (COREG) 25 MG tablet, Take 25 mg by mouth 2 (two) times a day., Disp: , Rfl:   •  furosemide (LASIX) 40 MG tablet, TAKE ONE TABLET BY MOUTH DAILY, Disp: 90 tablet, Rfl: 2  •  Multiple Vitamins-Minerals (MULTIVITAMIN ADULT PO), , Disp: , Rfl:   •  sacubitril-valsartan (Entresto) 24-26 MG tablet, Take 1 tablet  "by mouth 2 (Two) Times a Day., Disp: 60 tablet, Rfl: 0  •  sertraline (ZOLOFT) 100 MG tablet, Take 100 mg by mouth Daily., Disp: , Rfl: 2  •  spironolactone (ALDACTONE) 25 MG tablet, Take 1 tablet by mouth Daily., Disp: 90 tablet, Rfl: 3      Objective:     Vitals:    01/14/21 1340   BP: 128/92   Pulse: 68   Weight: 79.1 kg (174 lb 6.4 oz)   Height: 157.5 cm (62\")     Body mass index is 31.9 kg/m².    PHYSICAL EXAM:    Neck:      Vascular: No JVD.   Pulmonary:      Effort: Pulmonary effort is normal.      Breath sounds: Normal breath sounds.   Cardiovascular:      Normal rate. Regular rhythm.      Murmurs: There is no murmur.      No gallop. No click. No rub.   Pulses:     Intact distal pulses.           ECG 12 Lead    Date/Time: 1/14/2021 1:52 PM  Performed by: Lenora Madden APRN  Authorized by: Lenora Madden APRN   Comparison: compared with previous ECG from 10/8/2020  Similar to previous ECG  Rhythm: sinus rhythm  Rate: normal  BPM: 68  Conduction: left bundle branch block    Clinical impression: abnormal EKG  Comments: Indication: Cardiomyopathy              Assessment:       Diagnosis Plan   1. Cardiomyopathy, nonischemic (CMS/HCC)  ECG 12 Lead    Adult Transthoracic Echo Complete W/ Cont if Necessary Per Protocol   2. Biventricular implantable cardioverter-defibrillator in situ     3. LBBB (left bundle branch block)     4. H/O tricuspid valve repair     5. S/P MVR (mitral valve repair)       Orders Placed This Encounter   Procedures   • ECG 12 Lead     This order was created via procedure documentation   • Adult Transthoracic Echo Complete W/ Cont if Necessary Per Protocol     Standing Status:   Future     Standing Expiration Date:   1/14/2022     Order Specific Question:   Reason for exam?     Answer:   Heart Failure, Cardiomyopathy, or Sytemic or Pulmonary Hypertension          Plan:       I think she is doing well.  She denies any symptoms of angina or heart failure.  She is on guideline " directed medical therapy with carvedilol, Entresto, and spironolactone.  She appears euvolemic on the current dose of Lasix.  I think it would be appropriate to repeat an echocardiogram for reassessment of her LV function since starting Entresto.  I did encourage her to keep checking her blood pressures at home to ensure that her diastolic is consistently less than 90.  She has a stable left bundle branch block.  Attempting a lead revision would be very difficult.  I certainly do not think it is necessary at this point.  She is requesting to transition care to Dr. You.  Further recommendations will be made pending the results of her echocardiogram.      As always, it has been a pleasure to participate in your patient's care.      Sincerely,         HUSSEIN Negron

## 2021-02-19 ENCOUNTER — IMMUNIZATION (OUTPATIENT)
Dept: VACCINE CLINIC | Facility: HOSPITAL | Age: 76
End: 2021-02-19

## 2021-02-19 PROCEDURE — 91300 HC SARSCOV02 VAC 30MCG/0.3ML IM: CPT | Performed by: INTERNAL MEDICINE

## 2021-02-19 PROCEDURE — 0001A: CPT | Performed by: INTERNAL MEDICINE

## 2021-03-12 ENCOUNTER — IMMUNIZATION (OUTPATIENT)
Dept: VACCINE CLINIC | Facility: HOSPITAL | Age: 76
End: 2021-03-12

## 2021-03-12 PROCEDURE — 91300 HC SARSCOV02 VAC 30MCG/0.3ML IM: CPT | Performed by: INTERNAL MEDICINE

## 2021-03-12 PROCEDURE — 0002A: CPT | Performed by: INTERNAL MEDICINE

## 2021-03-25 ENCOUNTER — OFFICE VISIT (OUTPATIENT)
Dept: ORTHOPEDIC SURGERY | Facility: CLINIC | Age: 76
End: 2021-03-25

## 2021-03-25 DIAGNOSIS — G89.29 CHRONIC PAIN OF BOTH KNEES: Primary | ICD-10-CM

## 2021-03-25 DIAGNOSIS — M25.562 CHRONIC PAIN OF BOTH KNEES: Primary | ICD-10-CM

## 2021-03-25 DIAGNOSIS — M25.561 CHRONIC PAIN OF BOTH KNEES: Primary | ICD-10-CM

## 2021-03-25 PROCEDURE — 99213 OFFICE O/P EST LOW 20 MIN: CPT | Performed by: ORTHOPAEDIC SURGERY

## 2021-03-25 PROCEDURE — 73562 X-RAY EXAM OF KNEE 3: CPT | Performed by: ORTHOPAEDIC SURGERY

## 2021-03-25 PROCEDURE — 20610 DRAIN/INJ JOINT/BURSA W/O US: CPT | Performed by: ORTHOPAEDIC SURGERY

## 2021-03-25 RX ORDER — METHYLPREDNISOLONE ACETATE 80 MG/ML
80 INJECTION, SUSPENSION INTRA-ARTICULAR; INTRALESIONAL; INTRAMUSCULAR; SOFT TISSUE
Status: COMPLETED | OUTPATIENT
Start: 2021-03-25 | End: 2021-03-25

## 2021-03-25 RX ADMIN — METHYLPREDNISOLONE ACETATE 80 MG: 80 INJECTION, SUSPENSION INTRA-ARTICULAR; INTRALESIONAL; INTRAMUSCULAR; SOFT TISSUE at 18:05

## 2021-03-25 RX ADMIN — METHYLPREDNISOLONE ACETATE 80 MG: 80 INJECTION, SUSPENSION INTRA-ARTICULAR; INTRALESIONAL; INTRAMUSCULAR; SOFT TISSUE at 18:04

## 2021-03-25 NOTE — PROGRESS NOTES
Patient: Olivia Addison  YOB: 1945 75 y.o. female  Medical Record Number: [id number]    Chief Complaints:   Chief Complaint   Patient presents with   • Left Knee - Follow-up, Pain       History of Present Illness:Olivia Addison is a 75 y.o. female who presents for follow-up of bilateral knee pain.  She has right medial knee pain over the pes anserine bursa and left medial knee pain over the joint line.  Is fairly severe she describes a moderate ache that is limiting her activities    Allergies:   Allergies   Allergen Reactions   • Sulfa Antibiotics Itching and Rash     Hands turned red w/a rash       Medications:   Current Outpatient Medications   Medication Sig Dispense Refill   • acyclovir (ZOVIRAX) 400 MG tablet Take 400 mg by mouth 2 (two) times a day. Take no more than 5 doses a day.     • atorvastatin (LIPITOR) 40 MG tablet Take 40 mg by mouth daily.     • buPROPion XL (WELLBUTRIN XL) 300 MG 24 hr tablet Take 300 mg by mouth Daily.     • carvedilol (COREG) 25 MG tablet Take 25 mg by mouth 2 (two) times a day.     • furosemide (LASIX) 40 MG tablet TAKE ONE TABLET BY MOUTH DAILY 90 tablet 2   • Multiple Vitamins-Minerals (MULTIVITAMIN ADULT PO)      • sacubitril-valsartan (Entresto) 24-26 MG tablet Take 1 tablet by mouth 2 (Two) Times a Day. 60 tablet 0   • sertraline (ZOLOFT) 100 MG tablet Take 100 mg by mouth Daily.  2   • spironolactone (ALDACTONE) 25 MG tablet Take 1 tablet by mouth Daily. 90 tablet 3     No current facility-administered medications for this visit.         The following portions of the patient's history were reviewed and updated as appropriate: allergies, current medications, past family history, past medical history, past social history, past surgical history and problem list.    Review of Systems:   A 14 point review of systems was performed. All systems negative except pertinent positives/negative listed in HPI above    Physical Exam:   There were no vitals filed for  this visit.    General: A and O x 3, ASA, NAD    SCLERA:    Normal    DENTITION:   Normal  Right knee shows tenderness about the Pez anserine bursa left knee shows tenderness about the medial joint line she has overall good range of motion there is no effusion no instability she stands in neutral alignment walks with a nonantalgic gait.  The skin is normal.  She is intact light touch with palpable distal pulses    Radiology:  Xrays 3views both knees (ap,lateral, sunrise) were ordered and reviewed for evaluation of knee pain demonstratingmild joint space narrowing the medial joint space  todays xrays were compared to previous xrays and demonstrate no change    Assessment/Plan:  Right knee pes anserine bursitis the bursa was injected as below    Left knee OA I injected the left knee joint through a medial portal approach I will see her back as needed.  Large Joint Arthrocentesis: R knee  Date/Time: 3/25/2021 6:04 PM  Consent given by: patient  Site marked: site marked  Timeout: Immediately prior to procedure a time out was called to verify the correct patient, procedure, equipment, support staff and site/side marked as required   Supporting Documentation  Indications: pain and joint swelling   Procedure Details  Location: knee - R knee  Preparation: Patient was prepped and draped in the usual sterile fashion  Needle size: 22 G  Approach: medial  Medications administered: 80 mg methylPREDNISolone acetate 80 MG/ML; 2 mL lidocaine (cardiac)  Patient tolerance: patient tolerated the procedure well with no immediate complications    Large Joint Arthrocentesis: L knee  Date/Time: 3/25/2021 6:05 PM  Consent given by: patient  Site marked: site marked  Timeout: Immediately prior to procedure a time out was called to verify the correct patient, procedure, equipment, support staff and site/side marked as required   Supporting Documentation  Indications: pain and joint swelling   Procedure Details  Location: knee - L  knee  Preparation: Patient was prepped and draped in the usual sterile fashion  Needle size: 22 G  Approach: medial  Medications administered: 80 mg methylPREDNISolone acetate 80 MG/ML; 2 mL lidocaine (cardiac)  Patient tolerance: patient tolerated the procedure well with no immediate complications

## 2021-06-21 RX ORDER — SPIRONOLACTONE 25 MG/1
TABLET ORAL
Qty: 90 TABLET | Refills: 3 | Status: SHIPPED | OUTPATIENT
Start: 2021-06-21

## 2021-09-07 PROCEDURE — 93296 REM INTERROG EVL PM/IDS: CPT | Performed by: INTERNAL MEDICINE

## 2021-09-07 PROCEDURE — 93295 DEV INTERROG REMOTE 1/2/MLT: CPT | Performed by: INTERNAL MEDICINE

## 2021-09-13 ENCOUNTER — LAB REQUISITION (OUTPATIENT)
Dept: LAB | Facility: HOSPITAL | Age: 76
End: 2021-09-13

## 2021-09-13 DIAGNOSIS — Z00.00 ENCOUNTER FOR GENERAL ADULT MEDICAL EXAMINATION WITHOUT ABNORMAL FINDINGS: ICD-10-CM

## 2021-09-13 LAB — SARS-COV-2 ORF1AB RESP QL NAA+PROBE: NOT DETECTED

## 2021-09-13 PROCEDURE — U0005 INFEC AGEN DETEC AMPLI PROBE: HCPCS | Performed by: OPHTHALMOLOGY

## 2021-09-13 PROCEDURE — U0004 COV-19 TEST NON-CDC HGH THRU: HCPCS | Performed by: OPHTHALMOLOGY

## 2021-12-07 PROCEDURE — 93295 DEV INTERROG REMOTE 1/2/MLT: CPT | Performed by: INTERNAL MEDICINE

## 2021-12-07 PROCEDURE — 93296 REM INTERROG EVL PM/IDS: CPT | Performed by: INTERNAL MEDICINE

## 2022-02-24 ENCOUNTER — OFFICE VISIT (OUTPATIENT)
Dept: CARDIOLOGY | Facility: CLINIC | Age: 77
End: 2022-02-24

## 2022-02-24 ENCOUNTER — TELEPHONE (OUTPATIENT)
Dept: CARDIOLOGY | Facility: CLINIC | Age: 77
End: 2022-02-24

## 2022-02-24 VITALS
HEIGHT: 62 IN | OXYGEN SATURATION: 98 % | SYSTOLIC BLOOD PRESSURE: 138 MMHG | RESPIRATION RATE: 18 BRPM | BODY MASS INDEX: 32.76 KG/M2 | WEIGHT: 178 LBS | DIASTOLIC BLOOD PRESSURE: 88 MMHG | HEART RATE: 60 BPM

## 2022-02-24 DIAGNOSIS — I44.7 LBBB (LEFT BUNDLE BRANCH BLOCK): ICD-10-CM

## 2022-02-24 DIAGNOSIS — I42.8 CARDIOMYOPATHY, NONISCHEMIC: Primary | ICD-10-CM

## 2022-02-24 DIAGNOSIS — Z95.810 BIVENTRICULAR IMPLANTABLE CARDIOVERTER-DEFIBRILLATOR IN SITU: ICD-10-CM

## 2022-02-24 DIAGNOSIS — Z98.890 H/O TRICUSPID VALVE REPAIR: ICD-10-CM

## 2022-02-24 DIAGNOSIS — Z98.890 S/P MVR (MITRAL VALVE REPAIR): ICD-10-CM

## 2022-02-24 PROCEDURE — 99204 OFFICE O/P NEW MOD 45 MIN: CPT | Performed by: INTERNAL MEDICINE

## 2022-02-24 NOTE — TELEPHONE ENCOUNTER
Per Dr. Reda's request, can we have pt's remote device checks sent to us from now on please?    She has a MDT ICD previously managed by [facility].    Thank you,  Tita

## 2022-02-24 NOTE — PROGRESS NOTES
Subjective:     Encounter Date:02/24/2022      Patient ID: Olivia Addison is a 76 y.o. female.    Chief Complaint:  Chief Complaint   Patient presents with   • Establish Care   • Pacemaker Check     ICD Management   • Atrial Fibrillation   • Cardiomyopathy   • Cardiac Valve Problem       HPI:  Ms Addison is a 77 yo patient of Dr. Reyes who is seen as a new patient to establish care and followup of her ICD.  Her past medical history is significant for a NICM first diagnosed in 2012.  At that time she presented with symptoms of dyspnea and fatigue and was found to have an EF of <20%.  She had a mitral and tricuspid repair in 2012, as well as, implantation of a biventricular ICD.  She had significant clinical improvement with recovery of her EF.  In 2020 her LV lead fractured possibly as a result of playing with her grandkids.  Subsequently her EF dropped presumably due to loss of synchrony and chronic RV pacing.  Her device was reprogrammed to eliminate RV pacing.  She had clinical improvement again and her most recent echo 6/2021 showed an EF of 40-45%.    Her most recent cardiac evaluation has included a nuclear stress test 7/20221 which showed an EF of 60% with no ischemia.  The echo 6/2021 showed an EF of 40-45%, moderate RV enlargement but normal function, no significant MR and a mean gradient of 4mmHg.    Today, she states that she has been doing well and is able to carry out most of her usual daily activities.  She does get dyspneic with climbing stair.  She denies chest pain, PND, orthopnea, palpitations or ICD shock.        The following portions of the patient's history were reviewed and updated as appropriate: allergies, current medications, past family history, past medical history, past social history, past surgical history and problem list.    Problem List:  Patient Active Problem List   Diagnosis   • H/O tricuspid valve repair   • Abdominal pain   • Abdominal pain, left lower quadrant   • Acute  conjunctivitis   • Adjustment disorder with anxious mood   • Alcohol abuse   • Anxiety   • Attention deficit disorder   • Biventricular implantable cardioverter-defibrillator in situ   • Cardiomyopathy, nonischemic (HCC)   • Diverticulitis of colon   • Diverticulosis of intestine   • Flank pain   • H/O Estrellita-Biswas syndrome   • Hematuria   • Hypercholesterolemia   • Low back pain   • AMADO (obstructive sleep apnea)   • Posttraumatic stress disorder   • PTSD (post-traumatic stress disorder)   • Urinary tract infection   • S/P MVR (mitral valve repair)   • Diverticulitis   • Class 1 obesity in adult   • LBBB (left bundle branch block)   • CKD (chronic kidney disease) stage 3, GFR 30-59 ml/min ([facility])       Active Med List:    Current Outpatient Medications:   •  acyclovir (ZOVIRAX) 400 MG tablet, Take 400 mg by mouth 2 (two) times a day. Take no more than 5 doses a day., Disp: , Rfl:   •  atorvastatin (LIPITOR) 40 MG tablet, Take 40 mg by mouth daily., Disp: , Rfl:   •  buPROPion XL (WELLBUTRIN XL) 300 MG 24 hr tablet, Take 300 mg by mouth Daily., Disp: , Rfl:   •  carvedilol (COREG) 25 MG tablet, Take 25 mg by mouth 2 (two) times a day., Disp: , Rfl:   •  furosemide (LASIX) 40 MG tablet, TAKE ONE TABLET BY MOUTH DAILY, Disp: 90 tablet, Rfl: 2  •  sacubitril-valsartan (Entresto) 24-26 MG tablet, Take 1 tablet by mouth 2 (Two) Times a Day., Disp: 60 tablet, Rfl: 0  •  sertraline (ZOLOFT) 100 MG tablet, Take 100 mg by mouth Daily., Disp: , Rfl: 2  •  spironolactone (ALDACTONE) 25 MG tablet, TAKE 1 TABLET BY MOUTH EVERY DAY, Disp: 90 tablet, Rfl: 3     Past Medical History:  Past Medical History:   Diagnosis Date   • ADHD (attention deficit hyperactivity disorder)    • Adjustment disorder with anxiety    • Allergic rhinitis    • Arthritis    • Atherosclerosis    • Atrial fibrillation (HCC)    • Atrophic vaginitis    • Blepharitis of both eyes    • CAD (coronary artery disease)    • Cardiomyopathy (HCC)    • Cataract    •  CHF (congestive heart failure) ([facility])     CHRONIC SYSTOLIC   • CKD (chronic kidney disease) stage 3, GFR 30-59 ml/min ([facility])    • DDD (degenerative disc disease), cervical    • Depression    • Diaphoresis 05/23/2019    [facility]   • Diverticulitis 09/02/2019    [facility]   • Diverticulitis 11/15/2016   • Diverticulitis 09/07/2014   • Diverticulitis    • Diverticulitis large intestine 05/23/2017   • Diverticulitis of colon 08/22/2019    [facility]   • Dry eye syndrome    • Dyspnea    • Dysuria 06/2019   • Esophageal injury     ESTRELLITA-RAMOS SYNDROME   • Fatigue    • Hematuria 08/2019   • History of alcohol abuse    • Hypercholesterolemia    • Hyperlipidemia    • Hypertension    • Kidney cysts 07/2017    FOLLOWED BY DR. RITU AMOS   • LBBB (left bundle branch block)    • Estrellita-Ramos syndrome    • Memory loss    • NSVT (nonsustained ventricular tachycardia) ([facility])    • AMADO (obstructive sleep apnea)     USES MOUTHGUARDS with Bi-Pap   • Patent foramen ovale    • Pneumonia 05/25/2017   • Presbyopia    • PTSD (post-traumatic stress disorder)    • Reactive airway disease    • Regular astigmatism    • RLS (restless legs syndrome)    • S/P TVR (tricuspid valve repair)    • Seborrheic keratosis     FOLLOWED BY DR. HARMAN CÁRDENAS   • Segmental and somatic dysfunction of cervical region    • Segmental and somatic dysfunction of thoracic region    • Vitamin D deficiency    • VT (ventricular tachycardia) ([facility])        Past Surgical History:  Past Surgical History:   Procedure Laterality Date   • APPENDECTOMY N/A    • BIVENTRICULLAR IMPLANTABLE CARDIOVERTER DEFIBRILLATOR PLACEMENT N/A 04/10/2012    [employer], BI-V, DR. MICKEY MORALES AT [facility]   • BLADDER SUSPENSION N/A    • BREAST CYST ASPIRATION     • BREAST SURGERY Bilateral     REDUCTION MAMMAPLASTY   • CARDIAC CATHETERIZATION Bilateral 03/19/2012    Normal coronary arteries, severe mitral regurgitation and aortic regurgitation. EF was about 30-35%.; DR. MONTES  HEATHER AT [facility]   • CARDIAC ELECTROPHYSIOLOGY PROCEDURE Left 7/11/2016    Procedure: PPM battery change [employer]TR[employer];  Surgeon: Hunter Faust MD;  Location: [facility] INVASIVE LOCATION;  Service:    • CATARACT EXTRACTION     • COLONOSCOPY N/A 10/14/2014    PANDIVERTICULOSIS IN ENTIRE COLON, DR. BRANDEN RUIZ AT [facility]   • COLONOSCOPY N/A 11/13/2019    SCATTERED SMALL AND LARGE DIVERTICULA IN ENTIRE COLON, RESCOPE IN 10 YRS, DR. PERCY MUHAMMAD AT [facility]   • ENDOSCOPY N/A 06/06/2012    CHAD-RAMOS TEAR WITH ACTIVE BLEEDINGBLOOD THROUGHOUT STOMACH, BLOOD IN DUODENUM, DR. TARUN GILLIS AT [facility]   • HYSTERECTOMY N/A 1980    [facility] WITH BSO   • IMPLANTABLE CARDIOVERTER DEFIBRILLATOR LEAD REPLACEMENT/POCKET REVISION N/A 08/28/2012    DR. GISELLE BOOGIE AT [facility]   • MITRAL VALVE REPAIR/REPLACEMENT N/A 06/06/2012    REMODELING  ANULOPLASTY USING 24 MM BARAJAS ANULOPLASTY RING, MODEL 5200, SERIAL # [id number], DR. VINCE ZAMUDIO AT [facility]   • PATENT FORAMEN OVALE CLOSURE N/A 06/06/2012    DR. VINCE ZAMUDIO AT [facility]   • TONSILLECTOMY Bilateral    • TRICUSPID VALVE SURGERY N/A 06/06/2012    REMODELING ANULOPLASTY USING 26 MM BARAJAS ANULOPLASTY RING, MODEL 6200, SERIAL # [id number], DR. VINCE ZAMUDIO AT [facility]   • VAGINAL DELIVERY N/A 1974       Social History:  Social History     Socioeconomic History   • Marital status:    Tobacco Use   • Smoking status: Never Smoker   • Smokeless tobacco: Never Used   • Tobacco comment: caffine use   Substance and Sexual Activity   • Alcohol use: Yes     Alcohol/week: 1.0 standard drink     Types: 1 Glasses of wine per week   • Drug use: No   • Sexual activity: Defer     Birth control/protection: Post-menopausal, Surgical       Allergies:  Allergies   Allergen Reactions   • Sulfa Antibiotics Itching and Rash     Hands turned red w/a rash       Immunizations:  Immunization History   Administered Date(s) Administered   • COVID-19 (PFIZER) PURPLE CAP 02/19/2021, 03/12/2021, 09/16/2021   •  "Fluzone High Dose =>65 Years ([employer] ONLY) 10/27/2015, 10/13/2016, 10/26/2017, 11/30/2018   • Pneumococcal Conjugate 13-Valent (PCV13) 10/13/2016   • Pneumococcal Polysaccharide (PPSV23) 11/30/2018          Objective:         Review of Systems   Constitutional: Negative for fatigue.   Respiratory: Positive for shortness of breath.    Cardiovascular: Negative for chest pain, palpitations and leg swelling.   All other systems reviewed and are negative.       /88   Pulse 60   Resp 18   Ht 157.5 cm (62\")   Wt 80.7 kg (178 lb)   SpO2 98%   BMI 32.56 kg/m²     Constitutional:       General: Not in acute distress.     Appearance: Well-developed.   Eyes:      General: No scleral icterus.     Conjunctiva/sclera: Conjunctivae normal.      Pupils: Pupils are equal, round, and reactive to light.   HENT:      Head: Normocephalic and atraumatic.   Neck:      Thyroid: No thyromegaly.   Pulmonary:      Effort: Pulmonary effort is normal.      Breath sounds: Normal breath sounds.   Cardiovascular:      Normal rate. Regular rhythm.   Abdominal:      General: Bowel sounds are normal.      Palpations: Abdomen is soft.   Musculoskeletal: Normal range of motion.      Cervical back: Normal range of motion. Skin:     General: Skin is warm and dry.   Neurological:      Mental Status: Alert and oriented to person, place, and time.         In-Office Procedure(s):  Procedures  No orders to display      ICD eval interpreted by me  MDT [id number]  Battery 2.8 yrs to josue    P wave 1.4mv  Threshold 0.8V  Impedance 380 ohms    R wave 13mv  Threshold 0.8V  Impedance 456 ohms    LV - no capture at max output in any configuration  Impedance >3000 ohms    HV 39/54 ohms    Events - no AF, 1 NSVT      ASCVD RIsk Score::  The 10-year ASCVD risk score (Jenna ABRAHAN Jr., et al., 2013) is: 18.1%    Values used to calculate the score:      Age: 76 years      Sex: Female      Is Non- : No      Diabetic: No      Tobacco smoker: No   "    Systolic Blood Pressure: 138 mmHg      Is BP treated: No      HDL Cholesterol: 68 mg/dL      Total Cholesterol: 167 mg/dL    Recent Radiology:          Lab Review:       Recent labs reviewed and interpreted for clinical significance and application          Assessment:          Diagnosis Plan   1. Cardiomyopathy, nonischemic (HCC)  Adult Transthoracic Echo Complete W/ Color, Spectral and Contrast if Necessary Per Protocol   2. H/O tricuspid valve repair     3. S/P MVR (mitral valve repair)     4. LBBB (left bundle branch block)     5. Biventricular implantable cardioverter-defibrillator in situ            Plan:      1. ICD followup - LV lead is fractured, no capture at max output in any configuration. The LV lead is a Starfix making extraction difficulty and risky.  Normal device function otherwise.  She is doing well clinically, NYHA class 2, euvolemic. Since she is doing so well clinically at this time I would not recommend LV lead revision although we could attempt it when her device requires replacement in 2 years.  Will transfer [facility] to our office.    2. NICM - euvolemic, NYHA class 2, managed by Dr. Reyes    3. HLD - statin      Level of Care:                 Jerry Read MD  02/24/22

## 2022-02-25 ENCOUNTER — PATIENT ROUNDING (BHMG ONLY) (OUTPATIENT)
Dept: CARDIOLOGY | Facility: CLINIC | Age: 77
End: 2022-02-25

## 2022-02-25 NOTE — PROGRESS NOTES
"February 25, 2022    Hello, may I speak with Olivia Addison?    My name is Pilo Eli       I am  with [facility]  [address]  [facility] [address] [phone].    Before we get started may I verify your date of birth? 1945    I am calling to officially welcome you to our practice and ask about your recent visit. Is this a good time to talk? yes    Tell me about your visit with us. What things went well?  \"The major thing is that I feel so comfortable with Dr. Read, he was recommended very highly to me. I was very very impressed with him, his is kind, understanding, knowledgeable.\"        We're always looking for ways to make our patients' experiences even better. Do you have recommendations on ways we may improve?  yes \"Well I wish the  smiled at me\"     Overall were you satisfied with your first visit to our practice? yes       I appreciate you taking the time to speak with me today. Is there anything else I can do for you? no      Thank you, and have a great day.      "

## 2022-03-02 ENCOUNTER — PRE-ADMISSION TESTING (OUTPATIENT)
Dept: PREADMISSION TESTING | Facility: HOSPITAL | Age: 77
End: 2022-03-02

## 2022-03-02 VITALS
DIASTOLIC BLOOD PRESSURE: 78 MMHG | HEART RATE: 69 BPM | TEMPERATURE: 98 F | RESPIRATION RATE: 18 BRPM | WEIGHT: 179.4 LBS | HEIGHT: 62 IN | BODY MASS INDEX: 33.01 KG/M2 | SYSTOLIC BLOOD PRESSURE: 118 MMHG | OXYGEN SATURATION: 95 %

## 2022-03-02 LAB
ANION GAP SERPL CALCULATED.3IONS-SCNC: 12 MMOL/L (ref 5–15)
BUN SERPL-MCNC: 22 MG/DL (ref 8–23)
BUN/CREAT SERPL: 22 (ref 7–25)
CALCIUM SPEC-SCNC: 8.4 MG/DL (ref 8.6–10.5)
CHLORIDE SERPL-SCNC: 103 MMOL/L (ref 98–107)
CO2 SERPL-SCNC: 21 MMOL/L (ref 22–29)
CREAT SERPL-MCNC: 1 MG/DL (ref 0.57–1)
DEPRECATED RDW RBC AUTO: 46.1 FL (ref 37–54)
EGFRCR SERPLBLD CKD-EPI 2021: 58.9 ML/MIN/1.73
ERYTHROCYTE [DISTWIDTH] IN BLOOD BY AUTOMATED COUNT: 12.5 % (ref 12.3–15.4)
GLUCOSE SERPL-MCNC: 92 MG/DL (ref 65–99)
HCT VFR BLD AUTO: 35.8 % (ref 34–46.6)
HGB BLD-MCNC: 12 G/DL (ref 12–15.9)
MCH RBC QN AUTO: 33.4 PG (ref 26.6–33)
MCHC RBC AUTO-ENTMCNC: 33.5 G/DL (ref 31.5–35.7)
MCV RBC AUTO: 99.7 FL (ref 79–97)
PLATELET # BLD AUTO: 226 10*3/MM3 (ref 140–450)
PMV BLD AUTO: 9.6 FL (ref 6–12)
POTASSIUM SERPL-SCNC: 4.1 MMOL/L (ref 3.5–5.2)
QT INTERVAL: 503 MS
RBC # BLD AUTO: 3.59 10*6/MM3 (ref 3.77–5.28)
SARS-COV-2 RNA RESP QL NAA+PROBE: NOT DETECTED
SODIUM SERPL-SCNC: 136 MMOL/L (ref 136–145)
WBC NRBC COR # BLD: 5.25 10*3/MM3 (ref 3.4–10.8)

## 2022-03-02 PROCEDURE — 36415 COLL VENOUS BLD VENIPUNCTURE: CPT

## 2022-03-02 PROCEDURE — 93010 ELECTROCARDIOGRAM REPORT: CPT | Performed by: INTERNAL MEDICINE

## 2022-03-02 PROCEDURE — U0003 INFECTIOUS AGENT DETECTION BY NUCLEIC ACID (DNA OR RNA); SEVERE ACUTE RESPIRATORY SYNDROME CORONAVIRUS 2 (SARS-COV-2) (CORONAVIRUS DISEASE [COVID-19]), AMPLIFIED PROBE TECHNIQUE, MAKING USE OF HIGH THROUGHPUT TECHNOLOGIES AS DESCRIBED BY CMS-2020-01-R: HCPCS

## 2022-03-02 PROCEDURE — 93005 ELECTROCARDIOGRAM TRACING: CPT

## 2022-03-02 PROCEDURE — C9803 HOPD COVID-19 SPEC COLLECT: HCPCS

## 2022-03-02 PROCEDURE — U0005 INFEC AGEN DETEC AMPLI PROBE: HCPCS

## 2022-03-02 PROCEDURE — 85027 COMPLETE CBC AUTOMATED: CPT

## 2022-03-02 PROCEDURE — 80048 BASIC METABOLIC PNL TOTAL CA: CPT

## 2022-03-02 RX ORDER — MULTIVITAMIN
1 CAPSULE ORAL DAILY
COMMUNITY
End: 2022-03-03 | Stop reason: HOSPADM

## 2022-03-02 NOTE — DISCHARGE INSTRUCTIONS
Take the following medications the morning of surgery:  ENTRESTO, BUPROPION, CARVEDILOL, SERTRALINE    Arrive to hospital on your day of surgery at 8:00 AM.      If you are on prescription narcotic pain medication to control your pain you may also take that medication the morning of surgery.    General Instructions:  • Do not eat solid food after midnight the night before surgery.  • You may drink clear liquids day of surgery but must stop at least one hour before your hospital arrival time.  • It is beneficial for you to have a clear drink that contains carbohydrates the day of surgery.  We suggest a 12 to 20 ounce bottle of Gatorade or Powerade for non-diabetic patients or a 12 to 20 ounce bottle of G2 or Powerade Zero for diabetic patients. (Pediatric patients, are not advised to drink a 12 to 20 ounce carbohydrate drink)    Clear liquids are liquids you can see through.  Nothing red in color.     Plain water                               Sports drinks  Sodas                                   Gelatin (Jell-O)  Fruit juices without pulp such as white grape juice and apple juice  Popsicles that contain no fruit or yogurt  Tea or coffee (no cream or milk added)  Gatorade / Powerade  G2 / Powerade Zero    • Infants may have breast milk up to four hours before surgery.  • Infants drinking formula may drink formula up to six hours before surgery.   • Patients who avoid smoking, chewing tobacco and alcohol for 4 weeks prior to surgery have a reduced risk of post-operative complications.  Quit smoking as many days before surgery as you can.  • Do not smoke, use chewing tobacco or drink alcohol the day of surgery.   • If applicable bring your C-PAP/ BI-PAP machine.  • Bring any papers given to you in the doctor’s office.  • Wear clean comfortable clothes.  • Do not wear contact lenses, false eyelashes or make-up.  Bring a case for your glasses.   • Bring crutches or walker if applicable.  • Remove all piercings.  Leave  jewelry and any other valuables at home.  • Hair extensions with metal clips must be removed prior to surgery.  • The Pre-Admission Testing nurse will instruct you to bring medications if unable to obtain an accurate list in Pre-Admission Testing.        If you were given a blood bank ID arm band remember to bring it with you the day of surgery.    Preventing a Surgical Site Infection:  • For 2 to 3 days before surgery, avoid shaving with a razor because the razor can irritate skin and make it easier to develop an infection.    • Any areas of open skin can increase the risk of a post-operative wound infection by allowing bacteria to enter and travel throughout the body.  Notify your surgeon if you have any skin wounds / rashes even if it is not near the expected surgical site.  The area will need assessed to determine if surgery should be delayed until it is healed.  • The night prior to surgery shower using a fresh bar of anti-bacterial soap (such as Dial) and clean washcloth.  Sleep in a clean bed with clean clothing.  Do not allow pets to sleep with you.  • Shower on the morning of surgery using a fresh bar of anti-bacterial soap (such as Dial) and clean washcloth.  Dry with a clean towel and dress in clean clothing.  • Ask your surgeon if you will be receiving antibiotics prior to surgery.  • Make sure you, your family, and all healthcare providers clean their hands with soap and water or an alcohol based hand  before caring for you or your wound.    Day of surgery:  Your arrival time is approximately two hours before your scheduled surgery time.  Upon arrival, a Pre-op nurse and Anesthesiologist will review your health history, obtain vital signs, and answer questions you may have.  The only belongings needed at this time will be a list of your home medications and if applicable your C-PAP/BI-PAP machine.  A Pre-op nurse will start an IV and you may receive medication in preparation for surgery,  including something to help you relax.     Please be aware that surgery does come with discomfort.  We want to make every effort to control your discomfort so please discuss any uncontrolled symptoms with your nurse.   Your doctor will most likely have prescribed pain medications.      If you are going home after surgery you will receive individualized written care instructions before being discharged.  A responsible adult must drive you to and from the hospital on the day of your surgery and stay with you for 24 hours.  Discharge prescriptions can be filled by the hospital pharmacy during regular pharmacy hours.  If you are having surgery late in the day/evening your prescription may be e-prescribed to your pharmacy.  Please verify your pharmacy hours or chose a 24 hour pharmacy to avoid not having access to your prescription because your pharmacy has closed for the day.    If you are staying overnight following surgery, you will be transported to your hospital room following the recovery period.  [facility] has all private rooms.    If you have any questions please call Pre-Admission Testing at [phone].  Deductibles and co-payments are collected on the day of service. Please be prepared to pay the required co-pay, deductible or deposit on the day of service as defined by your plan.    Patient Education for Self-Quarantine Process    • Following your COVID testing, we strongly recommend that you wear a mask when you are with other people and practice social distancing.   • Limit your activities to only required outings.  • Wash your hands with soap and water frequently for at least 20 seconds.   • Avoid touching your eyes, nose and mouth with unwashed hands.  • Do not share anything - utensils, drinking glasses, food from the same bowl.   • Sanitize household surfaces daily. Include all high touch areas (door handles, light switches, phones, countertops, etc.)    Call your surgeon immediately  if you experience any of the following symptoms:  • Sore Throat  • Shortness of Breath or difficulty breathing  • Cough  • Chills  • Body soreness or muscle pain  • Headache  • Fever  • New loss of taste or smell  • Do not arrive for your surgery ill.  Your procedure will need to be rescheduled to another time.  You will need to call your physician before the day of surgery to avoid any unnecessary exposure to hospital staff as well as other patients.

## 2022-03-03 ENCOUNTER — HOSPITAL ENCOUNTER (OUTPATIENT)
Facility: HOSPITAL | Age: 77
Setting detail: HOSPITAL OUTPATIENT SURGERY
Discharge: HOME | End: 2022-03-03
Attending: OPHTHALMOLOGY | Admitting: OPHTHALMOLOGY

## 2022-03-03 ENCOUNTER — ANESTHESIA (OUTPATIENT)
Dept: PERIOP | Facility: HOSPITAL | Age: 77
End: 2022-03-03

## 2022-03-03 ENCOUNTER — ANESTHESIA EVENT (OUTPATIENT)
Dept: PERIOP | Facility: HOSPITAL | Age: 77
End: 2022-03-03

## 2022-03-03 VITALS
HEART RATE: 67 BPM | TEMPERATURE: 97.3 F | DIASTOLIC BLOOD PRESSURE: 70 MMHG | RESPIRATION RATE: 16 BRPM | SYSTOLIC BLOOD PRESSURE: 122 MMHG | OXYGEN SATURATION: 93 %

## 2022-03-03 PROCEDURE — 25010000002 FENTANYL CITRATE (PF) 50 MCG/ML SOLUTION: Performed by: NURSE ANESTHETIST, CERTIFIED REGISTERED

## 2022-03-03 PROCEDURE — 25010000002 MIDAZOLAM PER 1 MG: Performed by: ANESTHESIOLOGY

## 2022-03-03 PROCEDURE — 25010000002 DEXAMETHASONE PER 1 MG: Performed by: NURSE ANESTHETIST, CERTIFIED REGISTERED

## 2022-03-03 PROCEDURE — 25010000002 ONDANSETRON PER 1 MG: Performed by: NURSE ANESTHETIST, CERTIFIED REGISTERED

## 2022-03-03 PROCEDURE — 25010000002 PROPOFOL 10 MG/ML EMULSION: Performed by: NURSE ANESTHETIST, CERTIFIED REGISTERED

## 2022-03-03 PROCEDURE — 25010000002 FENTANYL CITRATE (PF) 50 MCG/ML SOLUTION: Performed by: ANESTHESIOLOGY

## 2022-03-03 PROCEDURE — 25010000002 PHENYLEPHRINE 10 MG/ML SOLUTION: Performed by: NURSE ANESTHETIST, CERTIFIED REGISTERED

## 2022-03-03 RX ORDER — PROMETHAZINE HYDROCHLORIDE 25 MG/1
25 TABLET ORAL ONCE AS NEEDED
Status: DISCONTINUED | OUTPATIENT
Start: 2022-03-03 | End: 2022-03-03 | Stop reason: HOSPADM

## 2022-03-03 RX ORDER — ONDANSETRON 2 MG/ML
4 INJECTION INTRAMUSCULAR; INTRAVENOUS ONCE AS NEEDED
Status: DISCONTINUED | OUTPATIENT
Start: 2022-03-03 | End: 2022-03-03 | Stop reason: HOSPADM

## 2022-03-03 RX ORDER — NALBUPHINE HCL 10 MG/ML
10 AMPUL (ML) INJECTION EVERY 4 HOURS PRN
Status: DISCONTINUED | OUTPATIENT
Start: 2022-03-03 | End: 2022-03-03 | Stop reason: HOSPADM

## 2022-03-03 RX ORDER — ONDANSETRON 2 MG/ML
INJECTION INTRAMUSCULAR; INTRAVENOUS AS NEEDED
Status: DISCONTINUED | OUTPATIENT
Start: 2022-03-03 | End: 2022-03-03 | Stop reason: SURG

## 2022-03-03 RX ORDER — SODIUM CHLORIDE, SODIUM LACTATE, POTASSIUM CHLORIDE, CALCIUM CHLORIDE 600; 310; 30; 20 MG/100ML; MG/100ML; MG/100ML; MG/100ML
9 INJECTION, SOLUTION INTRAVENOUS CONTINUOUS
Status: DISCONTINUED | OUTPATIENT
Start: 2022-03-03 | End: 2022-03-03 | Stop reason: HOSPADM

## 2022-03-03 RX ORDER — DIPHENHYDRAMINE HYDROCHLORIDE 50 MG/ML
12.5 INJECTION INTRAMUSCULAR; INTRAVENOUS
Status: DISCONTINUED | OUTPATIENT
Start: 2022-03-03 | End: 2022-03-03 | Stop reason: HOSPADM

## 2022-03-03 RX ORDER — LIDOCAINE HYDROCHLORIDE 10 MG/ML
0.5 INJECTION, SOLUTION EPIDURAL; INFILTRATION; INTRACAUDAL; PERINEURAL ONCE AS NEEDED
Status: DISCONTINUED | OUTPATIENT
Start: 2022-03-03 | End: 2022-03-03 | Stop reason: HOSPADM

## 2022-03-03 RX ORDER — ACETAMINOPHEN 500 MG
500 TABLET ORAL ONCE
Status: COMPLETED | OUTPATIENT
Start: 2022-03-03 | End: 2022-03-03

## 2022-03-03 RX ORDER — MIDAZOLAM HYDROCHLORIDE 1 MG/ML
0.5 INJECTION INTRAMUSCULAR; INTRAVENOUS
Status: DISCONTINUED | OUTPATIENT
Start: 2022-03-03 | End: 2022-03-03 | Stop reason: HOSPADM

## 2022-03-03 RX ORDER — ERYTHROMYCIN 5 MG/G
OINTMENT OPHTHALMIC AS NEEDED
Status: DISCONTINUED | OUTPATIENT
Start: 2022-03-03 | End: 2022-03-03 | Stop reason: HOSPADM

## 2022-03-03 RX ORDER — PROPOFOL 10 MG/ML
VIAL (ML) INTRAVENOUS AS NEEDED
Status: DISCONTINUED | OUTPATIENT
Start: 2022-03-03 | End: 2022-03-03 | Stop reason: SURG

## 2022-03-03 RX ORDER — FENTANYL CITRATE 50 UG/ML
50 INJECTION, SOLUTION INTRAMUSCULAR; INTRAVENOUS
Status: DISCONTINUED | OUTPATIENT
Start: 2022-03-03 | End: 2022-03-03 | Stop reason: HOSPADM

## 2022-03-03 RX ORDER — ERYTHROMYCIN 5 MG/G
OINTMENT OPHTHALMIC 2 TIMES DAILY
Qty: 3.5 G | Refills: 0 | Status: SHIPPED | OUTPATIENT
Start: 2022-03-03 | End: 2022-03-10

## 2022-03-03 RX ORDER — PHENYLEPHRINE HYDROCHLORIDE 10 MG/ML
INJECTION INTRAVENOUS AS NEEDED
Status: DISCONTINUED | OUTPATIENT
Start: 2022-03-03 | End: 2022-03-03 | Stop reason: SURG

## 2022-03-03 RX ORDER — FENTANYL CITRATE 50 UG/ML
INJECTION, SOLUTION INTRAMUSCULAR; INTRAVENOUS AS NEEDED
Status: DISCONTINUED | OUTPATIENT
Start: 2022-03-03 | End: 2022-03-03 | Stop reason: SURG

## 2022-03-03 RX ORDER — HYDROMORPHONE HYDROCHLORIDE 1 MG/ML
0.25 INJECTION, SOLUTION INTRAMUSCULAR; INTRAVENOUS; SUBCUTANEOUS
Status: DISCONTINUED | OUTPATIENT
Start: 2022-03-03 | End: 2022-03-03 | Stop reason: HOSPADM

## 2022-03-03 RX ORDER — HYDROCODONE BITARTRATE AND ACETAMINOPHEN 5; 325 MG/1; MG/1
1 TABLET ORAL ONCE AS NEEDED
Status: COMPLETED | OUTPATIENT
Start: 2022-03-03 | End: 2022-03-03

## 2022-03-03 RX ORDER — GLYCOPYRROLATE 0.2 MG/ML
INJECTION INTRAMUSCULAR; INTRAVENOUS AS NEEDED
Status: DISCONTINUED | OUTPATIENT
Start: 2022-03-03 | End: 2022-03-03 | Stop reason: SURG

## 2022-03-03 RX ORDER — NALOXONE HCL 0.4 MG/ML
0.4 VIAL (ML) INJECTION AS NEEDED
Status: DISCONTINUED | OUTPATIENT
Start: 2022-03-03 | End: 2022-03-03 | Stop reason: HOSPADM

## 2022-03-03 RX ORDER — HYDRALAZINE HYDROCHLORIDE 20 MG/ML
5 INJECTION INTRAMUSCULAR; INTRAVENOUS
Status: DISCONTINUED | OUTPATIENT
Start: 2022-03-03 | End: 2022-03-03 | Stop reason: HOSPADM

## 2022-03-03 RX ORDER — EPHEDRINE SULFATE 50 MG/ML
INJECTION, SOLUTION INTRAVENOUS AS NEEDED
Status: DISCONTINUED | OUTPATIENT
Start: 2022-03-03 | End: 2022-03-03 | Stop reason: SURG

## 2022-03-03 RX ORDER — SODIUM CHLORIDE 0.9 % (FLUSH) 0.9 %
10 SYRINGE (ML) INJECTION AS NEEDED
Status: DISCONTINUED | OUTPATIENT
Start: 2022-03-03 | End: 2022-03-03 | Stop reason: HOSPADM

## 2022-03-03 RX ORDER — SODIUM CHLORIDE 0.9 % (FLUSH) 0.9 %
10 SYRINGE (ML) INJECTION EVERY 12 HOURS SCHEDULED
Status: DISCONTINUED | OUTPATIENT
Start: 2022-03-03 | End: 2022-03-03 | Stop reason: HOSPADM

## 2022-03-03 RX ORDER — HYDROCODONE BITARTRATE AND ACETAMINOPHEN 5; 325 MG/1; MG/1
1 TABLET ORAL EVERY 6 HOURS PRN
Qty: 15 TABLET | Refills: 0 | Status: SHIPPED | OUTPATIENT
Start: 2022-03-03 | End: 2022-03-07

## 2022-03-03 RX ORDER — LIDOCAINE HYDROCHLORIDE 20 MG/ML
INJECTION, SOLUTION INFILTRATION; PERINEURAL AS NEEDED
Status: DISCONTINUED | OUTPATIENT
Start: 2022-03-03 | End: 2022-03-03 | Stop reason: SURG

## 2022-03-03 RX ORDER — NALBUPHINE HCL 10 MG/ML
2 AMPUL (ML) INJECTION EVERY 4 HOURS PRN
Status: DISCONTINUED | OUTPATIENT
Start: 2022-03-03 | End: 2022-03-03 | Stop reason: HOSPADM

## 2022-03-03 RX ORDER — MAGNESIUM HYDROXIDE 1200 MG/15ML
LIQUID ORAL AS NEEDED
Status: DISCONTINUED | OUTPATIENT
Start: 2022-03-03 | End: 2022-03-03 | Stop reason: HOSPADM

## 2022-03-03 RX ORDER — DEXAMETHASONE SODIUM PHOSPHATE 10 MG/ML
INJECTION INTRAMUSCULAR; INTRAVENOUS AS NEEDED
Status: DISCONTINUED | OUTPATIENT
Start: 2022-03-03 | End: 2022-03-03 | Stop reason: SURG

## 2022-03-03 RX ADMIN — ACETAMINOPHEN 500 MG: 500 TABLET ORAL at 09:40

## 2022-03-03 RX ADMIN — PHENYLEPHRINE HYDROCHLORIDE 100 MCG: 10 INJECTION, SOLUTION INTRAVENOUS at 11:19

## 2022-03-03 RX ADMIN — FENTANYL CITRATE 50 MCG: 50 INJECTION INTRAMUSCULAR; INTRAVENOUS at 13:15

## 2022-03-03 RX ADMIN — HYDROCODONE BITARTRATE AND ACETAMINOPHEN 1 TABLET: 5; 325 TABLET ORAL at 13:34

## 2022-03-03 RX ADMIN — ONDANSETRON 4 MG: 2 INJECTION INTRAMUSCULAR; INTRAVENOUS at 12:44

## 2022-03-03 RX ADMIN — PHENYLEPHRINE HYDROCHLORIDE 100 MCG: 10 INJECTION, SOLUTION INTRAVENOUS at 11:24

## 2022-03-03 RX ADMIN — FENTANYL CITRATE 25 MCG: 50 INJECTION INTRAMUSCULAR; INTRAVENOUS at 10:49

## 2022-03-03 RX ADMIN — FENTANYL CITRATE 25 MCG: 50 INJECTION INTRAMUSCULAR; INTRAVENOUS at 12:53

## 2022-03-03 RX ADMIN — FENTANYL CITRATE 25 MCG: 50 INJECTION INTRAMUSCULAR; INTRAVENOUS at 12:30

## 2022-03-03 RX ADMIN — FENTANYL CITRATE 25 MCG: 50 INJECTION INTRAMUSCULAR; INTRAVENOUS at 13:00

## 2022-03-03 RX ADMIN — SODIUM CHLORIDE, POTASSIUM CHLORIDE, SODIUM LACTATE AND CALCIUM CHLORIDE 9 ML/HR: 600; 310; 30; 20 INJECTION, SOLUTION INTRAVENOUS at 09:03

## 2022-03-03 RX ADMIN — PHENYLEPHRINE HYDROCHLORIDE 100 MCG: 10 INJECTION, SOLUTION INTRAVENOUS at 11:32

## 2022-03-03 RX ADMIN — EPHEDRINE SULFATE 5 MG: 50 INJECTION INTRAVENOUS at 10:55

## 2022-03-03 RX ADMIN — MIDAZOLAM 0.5 MG: 1 INJECTION INTRAMUSCULAR; INTRAVENOUS at 09:24

## 2022-03-03 RX ADMIN — DEXAMETHASONE SODIUM PHOSPHATE 6 MG: 10 INJECTION INTRAMUSCULAR; INTRAVENOUS at 10:57

## 2022-03-03 RX ADMIN — LIDOCAINE HYDROCHLORIDE 50 MG: 20 INJECTION, SOLUTION INFILTRATION; PERINEURAL at 10:51

## 2022-03-03 RX ADMIN — PHENYLEPHRINE HYDROCHLORIDE 100 MCG: 10 INJECTION, SOLUTION INTRAVENOUS at 11:28

## 2022-03-03 RX ADMIN — FENTANYL CITRATE 50 MCG: 50 INJECTION INTRAMUSCULAR; INTRAVENOUS at 13:31

## 2022-03-03 RX ADMIN — EPHEDRINE SULFATE 10 MG: 50 INJECTION INTRAVENOUS at 10:59

## 2022-03-03 RX ADMIN — GLYCOPYRROLATE 0.2 MG: 0.2 INJECTION INTRAMUSCULAR; INTRAVENOUS at 10:57

## 2022-03-03 RX ADMIN — PROPOFOL 100 MG: 10 INJECTION, EMULSION INTRAVENOUS at 10:52

## 2022-03-03 RX ADMIN — PHENYLEPHRINE HYDROCHLORIDE 100 MCG: 10 INJECTION, SOLUTION INTRAVENOUS at 11:03

## 2022-03-03 NOTE — ANESTHESIA PREPROCEDURE EVALUATION
Anesthesia Evaluation     NPO Solid Status: > 8 hours             Airway   Mallampati: II  TM distance: >3 FB  Neck ROM: full  No difficulty expected  Dental - normal exam     Pulmonary - normal exam   (+) pneumonia , shortness of breath, sleep apnea,   Cardiovascular     Rhythm: regular    (+) pacemaker ICD interrogated <1 month ago, hypertension, CAD, dysrhythmias Paroxysmal Atrial Fib, hyperlipidemia,   (-) murmur, carotid bruits    ROS comment: Hx of cardiomyopathy w low EF-> now improved to 60%  ICD  Valvular repair   No AS noted    Neuro/Psych  GI/Hepatic/Renal/Endo    (+)  GI bleeding , renal disease CRI,     Musculoskeletal     Abdominal    Substance History      OB/GYN          Other                        Anesthesia Plan    ASA 3   (  D/W R&B of GA including but not limited to: heart, lung, liver, kidney, neurologic problems, positioning injuries, dental damage, corneal abrasion and TMJ.  .)  intravenous induction     Anesthetic plan, all risks, benefits, and alternatives have been provided, discussed and informed consent has been obtained with: patient.        CODE STATUS:

## 2022-03-03 NOTE — H&P
History & Physical       Patient: Olivia Addison    Date of Admission: No admission date for patient encounter.    YOB: 1945    Medical Record Number: [id number]      Chief Complaints: droopy eyelids obstructing vision      History of Present Illness: 76 y.o. female presents with joshua brow ptosis, bul ptosis. No new meds/health problems since office visit      Allergies:   Allergies   Allergen Reactions   • Sulfa Antibiotics Itching and Rash     Hands turned red w/a rash       10 point review of systems negative, except pertaining to the HPI    Medications:   Home Medications:  No current facility-administered medications on file prior to encounter.     Current Outpatient Medications on File Prior to Encounter   Medication Sig   • acyclovir (ZOVIRAX) 400 MG tablet Take 400 mg by mouth 2 (two) times a day. Take no more than 5 doses a day.   • atorvastatin (LIPITOR) 40 MG tablet Take 40 mg by mouth daily.   • buPROPion XL (WELLBUTRIN XL) 300 MG 24 hr tablet Take 300 mg by mouth Daily.   • carvedilol (COREG) 25 MG tablet Take 25 mg by mouth 2 (two) times a day.   • furosemide (LASIX) 40 MG tablet TAKE ONE TABLET BY MOUTH DAILY (Patient taking differently: Take 20-40 mg by mouth Daily. 20 MG DAILY, UNLESS GAINS 2-3 POUNDS OVERNIGHT OR 5 POUNDS WEEKLY TO TAKE 40 MG)   • sacubitril-valsartan (Entresto) 24-26 MG tablet Take 1 tablet by mouth 2 (Two) Times a Day.   • sertraline (ZOLOFT) 100 MG tablet Take 100 mg by mouth Daily.   • spironolactone (ALDACTONE) 25 MG tablet TAKE 1 TABLET BY MOUTH EVERY DAY (Patient taking differently: Take 25 mg by mouth Daily.)     Current Medications:  Scheduled Meds:  Continuous Infusions:No current facility-administered medications for this encounter.    PRN Meds:.    Past Medical History:   Diagnosis Date   • ADHD (attention deficit hyperactivity disorder)    • Adjustment disorder with anxiety    • Allergic rhinitis    • Anxiety and depression    • Arthritis    •  Atherosclerosis    • Atrial fibrillation (HCC)     HX   • Atrophic vaginitis    • Blepharitis of both eyes    • CAD (coronary artery disease)    • Cardiomyopathy ([facility])     NON ISCHEMIC    • Cataract    • CHF (congestive heart failure) ([facility])     CHRONIC SYSTOLIC   • CKD (chronic kidney disease) stage 3, GFR 30-59 ml/min ([facility])    • DDD (degenerative disc disease), cervical    • Diaphoresis 05/23/2019    SEEN AT [facility]   • Diverticulitis 09/02/2019    SEEN AT [facility]   • Diverticulitis 11/15/2016   • Diverticulitis 09/07/2014   • Diverticulitis    • Diverticulitis large intestine 05/23/2017   • Diverticulitis of colon 08/22/2019    ADMITTED TO [facility]   • Dry eye syndrome    • Dyspnea    • Dysuria 06/2019   • Esophageal injury     ESTRELLITA-BISWAS SYNDROME   • Fatigue    • Heart failure ([facility])    • Hematuria 08/2019   • History of frequent urinary tract infections    • History of mitral valve repair    • History of sepsis     FROM UTI   • Hypercholesterolemia    • Hyperlipidemia    • Hypertension    • Kidney cysts 07/2017    FOLLOWED BY DR. 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 HARMAN CÁRDENAS   • Segmental and somatic dysfunction of cervical region    • Segmental and somatic dysfunction of thoracic region    • SOB (shortness of breath) on exertion     R/T HEART   • Vitamin D deficiency    • VT (ventricular tachycardia) ([facility])         Past Surgical History:   Procedure Laterality Date   • APPENDECTOMY  N/A    • BIVENTRICULLAR IMPLANTABLE CARDIOVERTER DEFIBRILLATOR PLACEMENT N/A 04/10/2012    MEDTRONIC, BI-V, DR. MICKEY MORALES AT [facility]   • BLADDER SUSPENSION N/A    • BREAST CYST ASPIRATION     • BREAST SURGERY Bilateral     REDUCTION MAMMAPLASTY   • CARDIAC CATHETERIZATION Bilateral 03/19/2012    Normal coronary arteries, severe mitral regurgitation and aortic regurgitation. EF was about 30-35%.; DR. JYOTI ROMERO AT [facility]   • CARDIAC ELECTROPHYSIOLOGY PROCEDURE Left 7/11/2016    Procedure: PPM battery change MEDTRONIC;  Surgeon: Hunter Faust MD;  Location: [facility] INVASIVE LOCATION;  Service:    • CATARACT EXTRACTION Bilateral    • COLONOSCOPY N/A 10/14/2014    PANDIVERTICULOSIS IN ENTIRE COLON, DR. BRANDEN RUIZ AT [facility]   • COLONOSCOPY N/A 11/13/2019    SCATTERED SMALL AND LARGE DIVERTICULA IN ENTIRE COLON, RESCOPE IN 10 YRS, DR. PERCY MUHAMMAD AT [facility]   • ENDOSCOPY N/A 06/06/2012    CHAD-RAMOS TEAR WITH ACTIVE BLEEDINGBLOOD THROUGHOUT STOMACH, BLOOD IN DUODENUM, DR. TARUN GILLIS AT [facility]   • ESOPHAGUS SURGERY      TEAR DURING HEART SURGERY, AT BOTTOM OF ESOPHAGUS   • HYSTERECTOMY N/A 1980    OLIVIA WITH BSO   • IMPLANTABLE CARDIOVERTER DEFIBRILLATOR LEAD REPLACEMENT/POCKET REVISION N/A 08/28/2012    DR. GISELLE BOOGIE AT [facility]   • MITRAL VALVE REPAIR/REPLACEMENT N/A 06/06/2012    REMODELING  ANULOPLASTY USING 24 MM BARAJAS ANULOPLASTY RING, MODEL 5200, SERIAL # [id number], DR. VINCE ZAMUDIO AT [facility]   • PATENT FORAMEN OVALE CLOSURE N/A 06/06/2012    DR. VINCE ZAMUDIO AT [facility]   • TONSILLECTOMY Bilateral    • TRICUSPID VALVE SURGERY N/A 06/06/2012    REMODELING ANULOPLASTY USING 26 MM BARAJAS ANULOPLASTY RING, MODEL 6200, SERIAL # [id number], DR. VINCE ZAMUDIO AT [facility]   • VAGINAL DELIVERY N/A 1974        Social History     Occupational History   • Not on file   Tobacco Use   • Smoking status: Never Smoker   • Smokeless tobacco: Never Used   Vaping Use   • Vaping Use: Never used   Substance and Sexual  Activity   • Alcohol use: Yes     Comment: WINE OCCASIONAL   • Drug use: No   • Sexual activity: Defer     Birth control/protection: Post-menopausal, Surgical      Social History     Social History Narrative   • Not on file        Family History   Problem Relation Age of Onset   • Diabetes Mother    • Hypertension Mother    • Emphysema Father    • Anxiety disorder Father    • Depression Father    • Hypertension Father    • Stroke Sister    • No Known Problems Brother    • Heart attack Maternal Grandmother    • Sleep apnea Maternal Grandmother    • No Known Problems Maternal Grandfather    • No Known Problems Paternal Grandmother    • No Known Problems Paternal Grandfather    • No Known Problems Brother    • No Known Problems Daughter    • Malig Hyperthermia Neg Hx            Physical Exam   There were no vitals taken for this visit.  Constitutional: Alert, cooperative, in no acute distress    Head: Normocephalic.   Eyes:   bul ptosis, joshua brow ptosis  Neck: Normal range of motion.   Cardiovascular: Normal rate.    Pulmonary/Chest: Effort normal.   Neurological: Alert.   Skin: Skin is warm.   Psychiatric: Normal mood and affect.       Assessment/Plan:  The patient voiced understanding of the risks, benefits, and alternative forms of treatment that were discussed and the patient consents to proceed with BILATERAL UPPER EYELID BLEPHAROPLASTY, BILATERAL INTERNAL BROWPEXY, BILATERAL LOWER LID BLEPHAROPLASTY.       Edu Power MD

## 2022-03-03 NOTE — OP NOTE
OPERATIVE NOTE    Patient Identification:  Name: Olivia Addison  Age: 76 y.o.  Sex: female  :  1945  MRN: [id number]                                               Preoperative diagnosis: Bilateral upper eyelid dermatochalasis and bilateral brow ptosis and lower lid steatochalasis  Postoperative diagnosis: same  Procedure: Bilateral upper eyelid blepharoplasty and bilateral brow pexy, bilateral lower eyelid blepharoplasty  Surgeon: Dr. Power who was present and scrubbed throughout all critical portions of the operation  Assistants: MD Prerna Villeda MS4  Anesthesia: General  EBL: less than 50cc  Specimens:  * No orders in the log *      Description of the procedure: The patient was taken to the operating room and placed on the table in the supine position, where anesthesia was induced. 2% lidocaine with epinephrine and 0.5% marcaine in a 1:1 fashion was injected over the surgical site, and the patient was prepped and draped in the usual manner for orbitofacial surgery.     Corneal protectors were placed in both eyes.    A 15 Bard-Carlos blade incision was made 8 mm from the eyelash margin, across the entire horizontal extent of the right upper eyelid. A second incision was made 12 mm inferior to the junction of the brow and eyelid, and a pinch test was used to ensure the amount of skin excision was appropriate. The intervening tissue was excised with a 15 Bard-Carlos blade and Shahriar scissors. Excessive orbicularis tissue was removed. The orbital septum was opened horizontally, and excessive fatty tissue was also removed. Bleeding was controlled with electrocauterization.     Next, through this same incision the brow was undermined superiorly to identify the superior orbital rim and expose the periosteum.  A 4-0 prolene suture was passed through the periosteum 9 mm superior to the superior orbital rim at the lateral brow, and then passed through the deep side of the skin muscle flap, again 9 mm  from the cut edge.  A second 4-0 prolene suture was passed in the same fashion 10 mm lateral to the first  A third 4-0 prolene was passed 10mm lateral to the second in similar fashion..  The brow was observed to be in good position. The skin was then closed with 5-0 fast absorbing suture in an interrupted and running fashion. The lid position and contour were examined and found to be satisfactory.     The exact same procedure was then performed over the contralateral upper lid and brow.     We then moved to the lower lid blepharoplasty  A 15 Bard-Carlos blade incision was made at the right lateral canthus, and sharp dissection was carried laterally a distance of 5 mm. A second incision was made 2 mm below the eyelash margin, across the entire horizontal extent of the eyelid. A sharp dissection plane was carried out below the orbicularis muscle layer until the entire lower lid was undermined. The orbital septum was opened horizontally, and herniated fatty tissue was removed. The remaining fatty tissue was recontoured to provide the optimal eyelid configuration. Bleeding was controlled with electrocauterization. The skin muscle flap was then advanced superiorly and temporally. Excessive skin muscle tissue was excised above the lateral canthal incision line. The skin muscle flap was anchored superiorly with a 5-0 vicryl suture anchored to the lateral orbital rim periosteum. The skin was then closed with 5-0 fast absorbing suture.     The exact same procedure was performed on the contralateral lower lid.     The corneal protectors were removed and antibiotic ophthalmic ointment was placed over the surgical site.     The patient was then awakened and taken from the operating room in good condition, having tolerated the procedure well. There were no complications, and the estimated blood loss was less than 50 cc.

## 2022-03-03 NOTE — ANESTHESIA POSTPROCEDURE EVALUATION
Patient: Olivia Addison    Procedure Summary     Date: 03/03/22 Room / Location:  [facility]  /  [facility]    Anesthesia Start: 1042 Anesthesia Stop: 1256    Procedures:       BILATERAL UPPER LID BLEPHAROPLASTY (Bilateral Eye)      BILATERAL LATERAL BROWPEXY (Bilateral Face)      BILATERAL LOWER LID BLEPHAROPLASTY (Bilateral Eye) Diagnosis:     Surgeons: Edu Power MD Provider: Shannon, Sterling Downing MD    Anesthesia Type: Not recorded ASA Status: 3          Anesthesia Type: No value filed.    Vitals  Vitals Value Taken Time   /82 03/03/22 1346   Temp 36.3 °C (97.3 °F) 03/03/22 1300   Pulse 66 03/03/22 1401   Resp 16 03/03/22 1345   SpO2 91 % 03/03/22 1401   Vitals shown include unvalidated device data.        Post Anesthesia Care and Evaluation    Patient location during evaluation: bedside  Patient participation: complete - patient participated  Level of consciousness: awake and alert  Pain management: adequate  Airway patency: patent  Anesthetic complications: No anesthetic complications    Cardiovascular status: acceptable  Respiratory status: acceptable  Hydration status: acceptable    Comments: /70 (BP Location: Left arm, Patient Position: Sitting)   Pulse 67   Temp 36.3 °C (97.3 °F) (Temporal)   Resp 16   SpO2 93%

## 2022-03-03 NOTE — ANESTHESIA PROCEDURE NOTES
Airway  Urgency: elective    Date/Time: 3/3/2022 10:53 AM  Airway not difficult    General Information and Staff    Patient location during procedure: OR  Anesthesiologist: Shannon, Sterling Ray, MD  CRNA: Taylor Kaplan CRNA    Indications and Patient Condition  Indications for airway management: airway protection    Preoxygenated: yes  MILS maintained throughout  Mask difficulty assessment: 0 - not attempted    Final Airway Details  Final airway type: supraglottic airway      Successful airway: classic  Size 4    Number of attempts at approach: 1  Assessment: lips, teeth, and gum same as pre-op    Additional Comments  Placed with ease. Vent with ease. Teeth as pre-op. Secured to face.

## 2022-03-03 NOTE — NURSING NOTE
Contacted Dr. Ortega to ask if the patients AICD needs to be interrogated after having a magnet on during the surgery. He said it does not need to be done. No new orders.

## 2022-03-08 PROCEDURE — 93295 DEV INTERROG REMOTE 1/2/MLT: CPT | Performed by: INTERNAL MEDICINE

## 2022-03-08 PROCEDURE — 93296 REM INTERROG EVL PM/IDS: CPT | Performed by: INTERNAL MEDICINE

## 2022-07-22 RX ORDER — SPIRONOLACTONE 25 MG/1
TABLET ORAL
Qty: 90 TABLET | Refills: 3 | OUTPATIENT
Start: 2022-07-22

## 2022-07-22 NOTE — TELEPHONE ENCOUNTER
It appears she has transitioned care to another cardiology group.  If that is the case, they will need to take over her refills.  Please clarify with the patient.

## 2022-08-17 ENCOUNTER — TELEPHONE (OUTPATIENT)
Dept: ORTHOPEDIC SURGERY | Facility: CLINIC | Age: 77
End: 2022-08-17

## 2022-08-23 ENCOUNTER — OFFICE VISIT (OUTPATIENT)
Dept: ORTHOPEDIC SURGERY | Facility: CLINIC | Age: 77
End: 2022-08-23

## 2022-08-23 VITALS — BODY MASS INDEX: 32.2 KG/M2 | WEIGHT: 175 LBS | HEIGHT: 62 IN | RESPIRATION RATE: 16 BRPM | TEMPERATURE: 97.8 F

## 2022-08-23 DIAGNOSIS — R52 PAIN: Primary | ICD-10-CM

## 2022-08-23 DIAGNOSIS — M54.16 LUMBAR RADICULOPATHY: ICD-10-CM

## 2022-08-23 DIAGNOSIS — M17.12 PRIMARY OSTEOARTHRITIS OF LEFT KNEE: ICD-10-CM

## 2022-08-23 PROCEDURE — 99214 OFFICE O/P EST MOD 30 MIN: CPT | Performed by: NURSE PRACTITIONER

## 2022-08-23 PROCEDURE — 20610 DRAIN/INJ JOINT/BURSA W/O US: CPT | Performed by: NURSE PRACTITIONER

## 2022-08-23 PROCEDURE — 73562 X-RAY EXAM OF KNEE 3: CPT | Performed by: NURSE PRACTITIONER

## 2022-08-23 RX ORDER — METHYLPREDNISOLONE ACETATE 80 MG/ML
80 INJECTION, SUSPENSION INTRA-ARTICULAR; INTRALESIONAL; INTRAMUSCULAR; SOFT TISSUE
Status: COMPLETED | OUTPATIENT
Start: 2022-08-23 | End: 2022-08-23

## 2022-08-23 RX ORDER — METHYLPREDNISOLONE 4 MG/1
TABLET ORAL
Qty: 21 TABLET | Refills: 0 | OUTPATIENT
Start: 2022-08-23 | End: 2023-02-02

## 2022-08-23 RX ADMIN — METHYLPREDNISOLONE ACETATE 80 MG: 80 INJECTION, SUSPENSION INTRA-ARTICULAR; INTRALESIONAL; INTRAMUSCULAR; SOFT TISSUE at 12:49

## 2022-08-23 NOTE — PROGRESS NOTES
Patient: Olivia Addison  YOB: 1945 77 y.o. female  Medical Record Number: [id number]    Chief Complaints:   Chief Complaint   Patient presents with   • Left Knee - Follow-up   • Right Hip - Pain, Initial Evaluation       History of Present Illness:Olivia Addiosn is a 77 y.o. female who presents for follow-up of left knee that is chronic in nature as well as new onset of right hip pain.  In regards to the patient's left knee this is a chronic issue which we have been treating her conservatively off and on for a few years for left knee osteoarthritis.  Patient states the pain is located to the medial aspect of her knee and describes it as a moderate to severe ache.  Patient states the pain is approximately a 6 or 7 out of 10 and it is constant on a daily basis.  Patient denies any new injury to her knee.  She states she is having difficulty with going up and down steps.  In regards to the patient's right hip/lumbar pain, she states this started about 3 weeks ago with no known injury.  Patient states the pain radiates down to her foot and ankle.  Denies any new injury to her hip.  She denies any groin pain.  She denies any signs or symptoms of infection in general she is without any other significant complaints today.    Allergies:   Allergies   Allergen Reactions   • Sulfa Antibiotics Itching and Rash     Hands turned red w/a rash       Medications:   Current Outpatient Medications   Medication Sig Dispense Refill   • acyclovir (ZOVIRAX) 400 MG tablet Take 400 mg by mouth 2 (two) times a day. Take no more than 5 doses a day.     • atorvastatin (LIPITOR) 40 MG tablet Take 40 mg by mouth daily.     • buPROPion XL (WELLBUTRIN XL) 300 MG 24 hr tablet Take 300 mg by mouth Daily.     • carvedilol (COREG) 25 MG tablet Take 25 mg by mouth 2 (two) times a day.     • furosemide (LASIX) 40 MG tablet TAKE ONE TABLET BY MOUTH DAILY (Patient taking differently: Take 20-40 mg by mouth Daily. 20 MG DAILY, UNLESS  "GAINS 2-3 POUNDS OVERNIGHT OR 5 POUNDS WEEKLY TO TAKE 40 MG) 90 tablet 2   • sacubitril-valsartan (Entresto) 24-26 MG tablet Take 1 tablet by mouth 2 (Two) Times a Day. 60 tablet 0   • sertraline (ZOLOFT) 100 MG tablet Take 100 mg by mouth Daily.  2   • spironolactone (ALDACTONE) 25 MG tablet TAKE 1 TABLET BY MOUTH EVERY DAY (Patient taking differently: Take 25 mg by mouth Daily.) 90 tablet 3   • methylPREDNISolone (MEDROL) 4 MG dose pack Use as directed by package instructions 21 tablet 0     No current facility-administered medications for this visit.         The following portions of the patient's history were reviewed and updated as appropriate: allergies, current medications, past family history, past medical history, past social history, past surgical history and problem list.    Review of Systems:   A 14 point review of systems was performed. All systems negative except pertinent positives/negative listed in HPI above    Physical Exam:   Vitals:    08/23/22 1130   Resp: 16   Temp: 97.8 °F (36.6 °C)   Weight: 79.4 kg (175 lb)   Height: 157.5 cm (62\")       General: A and O x 3, ASA, NAD    SCLERA:    Normal    DENTITION:   Normal    Knee:  left    ALIGNMENT:     Varus  ,   Patella  tracks  midline    GAIT:    Antalgic    SKIN:    No abnormality    RANGE OF MOTION:   3  -  120   DEG    STRENGTH:   4  / 5    LIGAMENTS:    No varus / valgus instability.   Negative  Lachman.    MENISCUS:     Negative   Marta       DISTAL PULSES:    Paplable    DISTAL SENSATION :   Intact    LYMPHATICS:     No   lymphadenopathy    OTHER:          - Positive   effusion      - Crepitance with ROM     Hip:  right    LEG ALIGNMENT:     Neutral   ,    equal leg lengths    GAIT:     Nonantalgic    SKIN:     No abnormality    RANGE OF MOTION:      Full without joint irritability    STRENGTH:     5 / 5    hip flexion and abduction    DISTAL PULSES:    Paplable    DISTAL SENSATION :   Intact    LYMPHATICS:     No   " lymphadenopathy    OTHER:          - Negative Stinchfeld test      - Negative log roll      - No Tenderness to palpation trochanteric bursa      - Neg FADIR      - Neg ESTRELLA      - No SI tenderness         Radiology:  Xrays 3views (ap,lateral, sunrise) were ordered and reviewed for evaluation of knee pain demonstrating advanced varus osteoarthritis with near bone on bone articulation, subchondral cysts, and periarticular osteophytes as well as patellofemoral osteoarthritis.  todays xrays were compared to previous xrays and demonstrate progression of the disease process    Assessment/Plan: Primary osteoarthritis left knee/lumbar radiculopathy  Treatment option as well as imaging results were discussed in detail with the patient.  In regards to the patient's knee we will still proceed forward with conservative measures.  We will going to give her an intra-articular joint injection as she has had previous luck in the past with these.  In regards into her right hip, we will get a give her a prescription for physical therapy of the lumbar spine as well as a Medrol Dosepak.  I have instructed the patient should she not be improved in the next 6 to 8 weeks in regards to her right hip, then we would may need to order an MRI of her lumbar spine.     Large Joint Arthrocentesis: L knee  Date/Time: 8/23/2022 12:49 PM  Consent given by: patient  Site marked: site marked  Timeout: Immediately prior to procedure a time out was called to verify the correct patient, procedure, equipment, support staff and site/side marked as required   Supporting Documentation  Indications: pain and joint swelling   Procedure Details  Location: knee - L knee  Preparation: Patient was prepped and draped in the usual sterile fashion  Needle size: 22 G  Approach: anterolateral  Medications administered: 80 mg methylPREDNISolone acetate 80 MG/ML; 2 mL lidocaine (cardiac)  Patient tolerance: patient tolerated the procedure well with no immediate  complications            Kwabena Mancilla, APRN

## 2022-09-06 PROCEDURE — 93296 REM INTERROG EVL PM/IDS: CPT | Performed by: INTERNAL MEDICINE

## 2022-09-06 PROCEDURE — 93295 DEV INTERROG REMOTE 1/2/MLT: CPT | Performed by: INTERNAL MEDICINE

## 2022-09-23 ENCOUNTER — APPOINTMENT (OUTPATIENT)
Dept: PHYSICAL THERAPY | Facility: HOSPITAL | Age: 77
End: 2022-09-23

## 2022-12-06 PROCEDURE — 93296 REM INTERROG EVL PM/IDS: CPT | Performed by: INTERNAL MEDICINE

## 2022-12-06 PROCEDURE — 93295 DEV INTERROG REMOTE 1/2/MLT: CPT | Performed by: INTERNAL MEDICINE

## 2023-01-03 ENCOUNTER — TELEPHONE (OUTPATIENT)
Dept: ORTHOPEDIC SURGERY | Facility: CLINIC | Age: 78
End: 2023-01-03

## 2023-01-03 NOTE — TELEPHONE ENCOUNTER
Caller: HOMER     Relationship to patient: SELF     Best call back number: [phone]    Type of visit:LEFT KNEE INJECTION

## 2023-01-19 ENCOUNTER — CLINICAL SUPPORT (OUTPATIENT)
Dept: ORTHOPEDIC SURGERY | Facility: CLINIC | Age: 78
End: 2023-01-19
Payer: MEDICARE

## 2023-01-19 VITALS — WEIGHT: 175 LBS | TEMPERATURE: 98.6 F | BODY MASS INDEX: 32.2 KG/M2 | RESPIRATION RATE: 16 BRPM | HEIGHT: 62 IN

## 2023-01-19 DIAGNOSIS — M17.12 PRIMARY OSTEOARTHRITIS OF LEFT KNEE: Primary | ICD-10-CM

## 2023-01-19 PROCEDURE — 73562 X-RAY EXAM OF KNEE 3: CPT | Performed by: NURSE PRACTITIONER

## 2023-01-19 PROCEDURE — 20610 DRAIN/INJ JOINT/BURSA W/O US: CPT | Performed by: NURSE PRACTITIONER

## 2023-01-19 RX ORDER — METHYLPREDNISOLONE ACETATE 80 MG/ML
80 INJECTION, SUSPENSION INTRA-ARTICULAR; INTRALESIONAL; INTRAMUSCULAR; SOFT TISSUE
Status: COMPLETED | OUTPATIENT
Start: 2023-01-19 | End: 2023-01-19

## 2023-01-19 RX ORDER — ESCITALOPRAM OXALATE 10 MG/1
10 TABLET ORAL DAILY
COMMUNITY
Start: 2023-01-12

## 2023-01-19 RX ADMIN — METHYLPREDNISOLONE ACETATE 80 MG: 80 INJECTION, SUSPENSION INTRA-ARTICULAR; INTRALESIONAL; INTRAMUSCULAR; SOFT TISSUE at 16:40

## 2023-01-19 NOTE — PROGRESS NOTES
1/19/2023    Olivia Addison is here today for worsening knee pain. Pt has undergone injection of the knee in the past with good resolution of symptoms. Pt is requesting a repeat injection.     KNEE Injection Procedure Note:    Large Joint Arthrocentesis: L knee  Date/Time: 1/19/2023 4:40 PM  Consent given by: patient  Site marked: site marked  Timeout: Immediately prior to procedure a time out was called to verify the correct patient, procedure, equipment, support staff and site/side marked as required   Supporting Documentation  Indications: pain and joint swelling   Procedure Details  Location: knee - L knee  Preparation: Patient was prepped and draped in the usual sterile fashion  Needle size: 22 G  Approach: anterolateral  Medications administered: 80 mg methylPREDNISolone acetate 80 MG/ML; 3 mL lidocaine (cardiac)  Patient tolerance: patient tolerated the procedure well with no immediate complications        Images: 3V left knee (AP, Lateral, and sunrise views) were ordered and reviewed for evaluation of left knee pain.  Patient has advanced varus deformity with bone-on-bone articulation, subchondral sclerosis, and periarticular osteophytes present.  In comparison to previous x-rays patient patient does not demonstrate significant arthritic changes.    At the conclusion of the injection I discussed the importance of continued quad strengthening exercises on a daily basis. I will see the patient back if the symptoms should fail to improve or worsen.    Kwabena Mancilla, APRN  1/19/2023

## 2023-01-29 ENCOUNTER — HOSPITAL ENCOUNTER (EMERGENCY)
Facility: HOSPITAL | Age: 78
Discharge: HOME | End: 2023-01-29
Attending: EMERGENCY MEDICINE | Admitting: EMERGENCY MEDICINE
Payer: MEDICARE

## 2023-01-29 VITALS
OXYGEN SATURATION: 97 % | HEART RATE: 66 BPM | DIASTOLIC BLOOD PRESSURE: 85 MMHG | RESPIRATION RATE: 18 BRPM | WEIGHT: 171 LBS | BODY MASS INDEX: 31.47 KG/M2 | HEIGHT: 62 IN | SYSTOLIC BLOOD PRESSURE: 147 MMHG | TEMPERATURE: 98.1 F

## 2023-01-29 DIAGNOSIS — R19.7 DIARRHEA, UNSPECIFIED TYPE: Primary | ICD-10-CM

## 2023-01-29 LAB
ALBUMIN SERPL-MCNC: 4.4 G/DL (ref 3.5–5.2)
ALBUMIN/GLOB SERPL: 1.7 G/DL
ALP SERPL-CCNC: 78 U/L (ref 39–117)
ALT SERPL W P-5'-P-CCNC: 17 U/L (ref 1–33)
ANION GAP SERPL CALCULATED.3IONS-SCNC: 9.5 MMOL/L (ref 5–15)
AST SERPL-CCNC: 15 U/L (ref 1–32)
BASOPHILS # BLD AUTO: 0.02 10*3/MM3 (ref 0–0.2)
BASOPHILS NFR BLD AUTO: 0.3 % (ref 0–1.5)
BILIRUB SERPL-MCNC: 0.3 MG/DL (ref 0–1.2)
BUN SERPL-MCNC: 17 MG/DL (ref 8–23)
BUN/CREAT SERPL: 17.2 (ref 7–25)
CALCIUM SPEC-SCNC: 9 MG/DL (ref 8.6–10.5)
CHLORIDE SERPL-SCNC: 104 MMOL/L (ref 98–107)
CO2 SERPL-SCNC: 22.5 MMOL/L (ref 22–29)
CREAT SERPL-MCNC: 0.99 MG/DL (ref 0.57–1)
DEPRECATED RDW RBC AUTO: 43.4 FL (ref 37–54)
EGFRCR SERPLBLD CKD-EPI 2021: 59.2 ML/MIN/1.73
EOSINOPHIL # BLD AUTO: 0.14 10*3/MM3 (ref 0–0.4)
EOSINOPHIL NFR BLD AUTO: 2.3 % (ref 0.3–6.2)
ERYTHROCYTE [DISTWIDTH] IN BLOOD BY AUTOMATED COUNT: 12 % (ref 12.3–15.4)
GLOBULIN UR ELPH-MCNC: 2.6 GM/DL
GLUCOSE SERPL-MCNC: 94 MG/DL (ref 65–99)
HCT VFR BLD AUTO: 38 % (ref 34–46.6)
HGB BLD-MCNC: 12.5 G/DL (ref 12–15.9)
IMM GRANULOCYTES # BLD AUTO: 0.02 10*3/MM3 (ref 0–0.05)
IMM GRANULOCYTES NFR BLD AUTO: 0.3 % (ref 0–0.5)
LYMPHOCYTES # BLD AUTO: 1.33 10*3/MM3 (ref 0.7–3.1)
LYMPHOCYTES NFR BLD AUTO: 21.9 % (ref 19.6–45.3)
MCH RBC QN AUTO: 32.3 PG (ref 26.6–33)
MCHC RBC AUTO-ENTMCNC: 32.9 G/DL (ref 31.5–35.7)
MCV RBC AUTO: 98.2 FL (ref 79–97)
MONOCYTES # BLD AUTO: 0.64 10*3/MM3 (ref 0.1–0.9)
MONOCYTES NFR BLD AUTO: 10.6 % (ref 5–12)
NEUTROPHILS NFR BLD AUTO: 3.91 10*3/MM3 (ref 1.7–7)
NEUTROPHILS NFR BLD AUTO: 64.6 % (ref 42.7–76)
NRBC BLD AUTO-RTO: 0 /100 WBC (ref 0–0.2)
PLATELET # BLD AUTO: 229 10*3/MM3 (ref 140–450)
PMV BLD AUTO: 9.2 FL (ref 6–12)
POTASSIUM SERPL-SCNC: 4.8 MMOL/L (ref 3.5–5.2)
PROT SERPL-MCNC: 7 G/DL (ref 6–8.5)
RBC # BLD AUTO: 3.87 10*6/MM3 (ref 3.77–5.28)
SODIUM SERPL-SCNC: 136 MMOL/L (ref 136–145)
WBC NRBC COR # BLD: 6.06 10*3/MM3 (ref 3.4–10.8)

## 2023-01-29 PROCEDURE — 85025 COMPLETE CBC W/AUTO DIFF WBC: CPT | Performed by: PHYSICIAN ASSISTANT

## 2023-01-29 PROCEDURE — 80053 COMPREHEN METABOLIC PANEL: CPT | Performed by: PHYSICIAN ASSISTANT

## 2023-01-29 PROCEDURE — 99283 EMERGENCY DEPT VISIT LOW MDM: CPT

## 2023-01-29 RX ADMIN — SODIUM CHLORIDE 500 ML: 9 INJECTION, SOLUTION INTRAVENOUS at 16:21

## 2023-02-02 ENCOUNTER — HOSPITAL ENCOUNTER (EMERGENCY)
Facility: HOSPITAL | Age: 78
Discharge: HOME | End: 2023-02-02
Attending: EMERGENCY MEDICINE | Admitting: EMERGENCY MEDICINE
Payer: MEDICARE

## 2023-02-02 VITALS
HEART RATE: 64 BPM | DIASTOLIC BLOOD PRESSURE: 83 MMHG | OXYGEN SATURATION: 96 % | RESPIRATION RATE: 16 BRPM | TEMPERATURE: 98.4 F | SYSTOLIC BLOOD PRESSURE: 137 MMHG

## 2023-02-02 DIAGNOSIS — E87.1 HYPONATREMIA: ICD-10-CM

## 2023-02-02 DIAGNOSIS — R19.7 DIARRHEA, UNSPECIFIED TYPE: Primary | ICD-10-CM

## 2023-02-02 LAB
ABO GROUP BLD: NORMAL
ALBUMIN SERPL-MCNC: 4.5 G/DL (ref 3.5–5.2)
ALBUMIN/GLOB SERPL: 1.7 G/DL
ALP SERPL-CCNC: 85 U/L (ref 39–117)
ALT SERPL W P-5'-P-CCNC: 18 U/L (ref 1–33)
ANION GAP SERPL CALCULATED.3IONS-SCNC: 10 MMOL/L (ref 5–15)
AST SERPL-CCNC: 20 U/L (ref 1–32)
BACTERIA UR QL AUTO: NORMAL /HPF
BASOPHILS # BLD AUTO: 0.03 10*3/MM3 (ref 0–0.2)
BASOPHILS NFR BLD AUTO: 0.4 % (ref 0–1.5)
BILIRUB SERPL-MCNC: 0.5 MG/DL (ref 0–1.2)
BILIRUB UR QL STRIP: NEGATIVE
BLD GP AB SCN SERPL QL: NEGATIVE
BUN SERPL-MCNC: 19 MG/DL (ref 8–23)
BUN/CREAT SERPL: 17.8 (ref 7–25)
CALCIUM SPEC-SCNC: 9.6 MG/DL (ref 8.6–10.5)
CHLORIDE SERPL-SCNC: 99 MMOL/L (ref 98–107)
CLARITY UR: CLEAR
CO2 SERPL-SCNC: 23 MMOL/L (ref 22–29)
COLOR UR: YELLOW
CREAT SERPL-MCNC: 1.07 MG/DL (ref 0.57–1)
DEPRECATED RDW RBC AUTO: 40.6 FL (ref 37–54)
EGFRCR SERPLBLD CKD-EPI 2021: 53.9 ML/MIN/1.73
EOSINOPHIL # BLD AUTO: 0.1 10*3/MM3 (ref 0–0.4)
EOSINOPHIL NFR BLD AUTO: 1.4 % (ref 0.3–6.2)
ERYTHROCYTE [DISTWIDTH] IN BLOOD BY AUTOMATED COUNT: 11.8 % (ref 12.3–15.4)
EXPIRATION DATE: NORMAL
FECAL OCCULT BLOOD SCREEN, POC: NEGATIVE
GLOBULIN UR ELPH-MCNC: 2.6 GM/DL
GLUCOSE SERPL-MCNC: 111 MG/DL (ref 65–99)
GLUCOSE UR STRIP-MCNC: NEGATIVE MG/DL
HCT VFR BLD AUTO: 38.7 % (ref 34–46.6)
HGB BLD-MCNC: 13.2 G/DL (ref 12–15.9)
HGB UR QL STRIP.AUTO: ABNORMAL
HOLD SPECIMEN: NORMAL
HOLD SPECIMEN: NORMAL
HYALINE CASTS UR QL AUTO: NORMAL /LPF
IMM GRANULOCYTES # BLD AUTO: 0.03 10*3/MM3 (ref 0–0.05)
IMM GRANULOCYTES NFR BLD AUTO: 0.4 % (ref 0–0.5)
INR PPP: 1.04 (ref 0.9–1.1)
KETONES UR QL STRIP: NEGATIVE
LEUKOCYTE ESTERASE UR QL STRIP.AUTO: NEGATIVE
LYMPHOCYTES # BLD AUTO: 1.29 10*3/MM3 (ref 0.7–3.1)
LYMPHOCYTES NFR BLD AUTO: 17.9 % (ref 19.6–45.3)
Lab: 204
MCH RBC QN AUTO: 32.3 PG (ref 26.6–33)
MCHC RBC AUTO-ENTMCNC: 34.1 G/DL (ref 31.5–35.7)
MCV RBC AUTO: 94.6 FL (ref 79–97)
MONOCYTES # BLD AUTO: 0.72 10*3/MM3 (ref 0.1–0.9)
MONOCYTES NFR BLD AUTO: 10 % (ref 5–12)
NEGATIVE CONTROL: NEGATIVE
NEUTROPHILS NFR BLD AUTO: 5.05 10*3/MM3 (ref 1.7–7)
NEUTROPHILS NFR BLD AUTO: 69.9 % (ref 42.7–76)
NITRITE UR QL STRIP: NEGATIVE
NRBC BLD AUTO-RTO: 0 /100 WBC (ref 0–0.2)
PH UR STRIP.AUTO: 5.5 [PH] (ref 5–8)
PLATELET # BLD AUTO: 256 10*3/MM3 (ref 140–450)
PMV BLD AUTO: 9 FL (ref 6–12)
POSITIVE CONTROL: POSITIVE
POTASSIUM SERPL-SCNC: 4.8 MMOL/L (ref 3.5–5.2)
PROT SERPL-MCNC: 7.1 G/DL (ref 6–8.5)
PROT UR QL STRIP: NEGATIVE
PROTHROMBIN TIME: 13.8 SECONDS (ref 11.7–14.2)
RBC # BLD AUTO: 4.09 10*6/MM3 (ref 3.77–5.28)
RBC # UR STRIP: NORMAL /HPF
REF LAB TEST METHOD: NORMAL
RH BLD: POSITIVE
SODIUM SERPL-SCNC: 132 MMOL/L (ref 136–145)
SP GR UR STRIP: 1.01 (ref 1–1.03)
SQUAMOUS #/AREA URNS HPF: NORMAL /HPF
T&S EXPIRATION DATE: NORMAL
UROBILINOGEN UR QL STRIP: ABNORMAL
WBC # UR STRIP: NORMAL /HPF
WBC NRBC COR # BLD: 7.22 10*3/MM3 (ref 3.4–10.8)
WHOLE BLOOD HOLD COAG: NORMAL
WHOLE BLOOD HOLD SPECIMEN: NORMAL

## 2023-02-02 PROCEDURE — 99283 EMERGENCY DEPT VISIT LOW MDM: CPT

## 2023-02-02 PROCEDURE — 81001 URINALYSIS AUTO W/SCOPE: CPT | Performed by: EMERGENCY MEDICINE

## 2023-02-02 PROCEDURE — 86900 BLOOD TYPING SEROLOGIC ABO: CPT | Performed by: EMERGENCY MEDICINE

## 2023-02-02 PROCEDURE — 85025 COMPLETE CBC W/AUTO DIFF WBC: CPT | Performed by: EMERGENCY MEDICINE

## 2023-02-02 PROCEDURE — 99284 EMERGENCY DEPT VISIT MOD MDM: CPT

## 2023-02-02 PROCEDURE — 82270 OCCULT BLOOD FECES: CPT | Performed by: EMERGENCY MEDICINE

## 2023-02-02 PROCEDURE — 86901 BLOOD TYPING SEROLOGIC RH(D): CPT | Performed by: EMERGENCY MEDICINE

## 2023-02-02 PROCEDURE — 85610 PROTHROMBIN TIME: CPT | Performed by: EMERGENCY MEDICINE

## 2023-02-02 PROCEDURE — 86850 RBC ANTIBODY SCREEN: CPT | Performed by: EMERGENCY MEDICINE

## 2023-02-02 PROCEDURE — 80053 COMPREHEN METABOLIC PANEL: CPT | Performed by: EMERGENCY MEDICINE

## 2023-02-02 RX ORDER — CIPROFLOXACIN 500 MG/1
500 TABLET, FILM COATED ORAL 2 TIMES DAILY
Qty: 14 TABLET | Refills: 0 | Status: SHIPPED | OUTPATIENT
Start: 2023-02-02

## 2023-02-02 RX ORDER — CIPROFLOXACIN 500 MG/1
500 TABLET, FILM COATED ORAL ONCE
Status: COMPLETED | OUTPATIENT
Start: 2023-02-02 | End: 2023-02-02

## 2023-02-02 RX ORDER — SODIUM CHLORIDE 0.9 % (FLUSH) 0.9 %
10 SYRINGE (ML) INJECTION AS NEEDED
Status: DISCONTINUED | OUTPATIENT
Start: 2023-02-02 | End: 2023-02-02 | Stop reason: HOSPADM

## 2023-02-02 RX ADMIN — CIPROFLOXACIN 500 MG: 500 TABLET, FILM COATED ORAL at 19:35

## 2023-02-02 NOTE — DISCHARGE INSTRUCTIONS
You are advised to follow closely with Dr. Lynne in 2-3 days and Dr. Lenard Jacob or GI specialist of your choice in 1 to 2 weeks for recheck, final results of lab work and imaging testing, and further testing/treatment as needed.    Discontinue ciprofloxacin 750mg.    Start taking ciprofloxacin 500mg twice daily    Follow with Dr Lynne for your urine results.    Drink plenty of fluids-at least 64 ounces of fluids daily.  Take probiotics daily.  Fermented foods such as kombucha can provide probiotics for gut health.  Please ensure some of your fluids have electrolytes in them given your sodium needs to be replenished.    Please return to the emergency department immediately with chest pain different than usual for you, shortness of air, abdominal pain, persistent vomiting/fever, blood in emesis or stool, lightheadedness/fainting, problems with speech, one sided weakness/numbness, new incontinence, problems with vision,  or for worsening of symptoms or other concerns.

## 2023-02-02 NOTE — ED TRIAGE NOTES
Pt has had black stool x 2 days.  She has had diarrhea since January 7.  She has abd pain    Patient was placed in face mask during first look triage.  Patient was wearing a face mask throughout encounter.  I wore personal protective equipment throughout the encounter.  Hand hygiene was performed before and after patient encounter.

## 2023-02-02 NOTE — ED PROVIDER NOTES
EMERGENCY DEPARTMENT ENCOUNTER    CHIEF COMPLAINT  Chief Complaint: Black stool/diarrhea  History given by: Patient  History limited by: Nothing  Room Number: 23/23  PMD: Danuta Lynne MD      HPI:  Pt is a 77 y.o. female presents reporting she has had 3 and half weeks of diarrhea, with 2 episodes of black stool within the past 48 hours.  Patient reports she feels fatigued with no energy, denies abdominal pain, nausea/vomiting, fevers, chest pain, shortness of air, cough, changes to urinary habits.  Patient reports she was put on ciprofloxacin 2 days prior to arrival for GI infection by her PCP.      Aggravating Factors: Eating or drinking  Alleviating Factors: Nothing  Treatment before arrival: Seen by PCP, had stool cultures, placed on ciprofloxacin 2 days prior to arrival  Chronic or social conditions impacting care: None known    Additional sources:  Discussed/ obtained information from independent historians: Patient and sister at bedside    External (non-ED) record review:   Patient seen in Danuta Weston's office 1/26/2023 with stool cultures done that showed heavy growth of Pseudomonas aeruginosa.  No C. difficile, Giardia, Cryptosporidium, Salmonella, Shigella, Campylobacter.        PAST MEDICAL HISTORY  Active Ambulatory Problems     Diagnosis Date Noted   • H/O tricuspid valve repair 10/14/2016   • Abdominal pain 10/18/2017   • Abdominal pain, left lower quadrant 10/08/2014   • Acute conjunctivitis 06/13/2016   • Adjustment disorder with anxious mood 10/18/2017   • Alcohol abuse 10/18/2017   • Anxiety 10/18/2017   • Attention deficit disorder 10/18/2017   • Biventricular implantable cardioverter-defibrillator in situ 06/18/2013   • Cardiomyopathy, nonischemic (HCC) 06/18/2013   • Diverticulitis of colon 10/18/2017   • Diverticulosis of intestine 10/18/2017   • Flank pain 10/18/2017   • 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   • Hematuria 10/18/2017   • Hypercholesterolemia 10/18/2017   • Low  back pain 10/18/2017   • AMADO (obstructive sleep apnea) 09/29/2016   • Posttraumatic stress disorder 10/18/2017   • PTSD (post-traumatic stress disorder) 11/19/2014   • Urinary tract infection 10/18/2017   • S/P MVR (mitral valve repair) 10/18/2017   • Diverticulitis 08/23/2019   • Class 1 obesity in adult 08/23/2019   • LBBB (left bundle branch block)    • CKD (chronic kidney disease) stage 3, GFR 30-59 ml/min ([facility])      Resolved Ambulatory Problems     Diagnosis Date Noted   • Atrial fibrillation ([facility]) 06/18/2013   • Valvular disease 11/08/2017   • HTN (hypertension) 02/16/2018   • CHF (congestive heart failure) ([facility]) 02/16/2018     Past Medical History:   Diagnosis Date   • ADHD (attention deficit hyperactivity disorder)    • Adjustment disorder with anxiety    • Allergic rhinitis    • Anxiety and depression    • Arthritis    • Atherosclerosis    • Atrophic vaginitis    • Blepharitis of both eyes    • CAD (coronary artery disease)    • Cardiomyopathy ([facility])    • Cataract    • DDD (degenerative disc disease), cervical    • Diaphoresis 05/23/2019   • Diverticulitis large intestine 05/23/2017   • Dry eye syndrome    • Dyspnea    • Dysuria 06/2019   • Esophageal injury    • Fatigue    • Heart failure ([facility])    • History of frequent urinary tract infections    • History of mitral valve repair    • History of sepsis    • Hyperlipidemia    • Hypertension    • Kidney cysts 07/2017   • Estrellita-Biswas syndrome    • Memory loss    • NSVT (nonsustained ventricular tachycardia)    • Osteoporosis    • Patent foramen ovale    • Pneumonia 05/25/2017   • Presbyopia    • Presence of cardiac defibrillator    • Reactive airway disease    • Regular astigmatism    • RLS (restless legs syndrome)    • S/P TVR (tricuspid valve repair)    • Seborrheic keratosis    • Segmental and somatic dysfunction of cervical region    • Segmental and somatic dysfunction of thoracic region    • SOB (shortness of breath) on exertion    • Vitamin D deficiency     • VT (ventricular tachycardia)        PAST SURGICAL HISTORY  Past Surgical History:   Procedure Laterality Date   • APPENDECTOMY N/A    • BIVENTRICULLAR IMPLANTABLE CARDIOVERTER DEFIBRILLATOR PLACEMENT N/A 04/10/2012    [employer], BI-V, DR. MICKEY MORALES AT [facility]   • BLADDER SUSPENSION N/A    • BLEPHAROPLASTY Bilateral 3/3/2022    Procedure: BILATERAL UPPER LID BLEPHAROPLASTY;  Surgeon: Edu Power MD;  Location:  [facility];  Service: Ophthalmology;  Laterality: Bilateral;   • BLEPHAROPLASTY Bilateral 3/3/2022    Procedure: BILATERAL LOWER LID BLEPHAROPLASTY;  Surgeon: Edu Power MD;  Location:  [facility];  Service: New Image;  Laterality: Bilateral;   • BREAST CYST ASPIRATION     • BREAST SURGERY Bilateral     REDUCTION MAMMAPLASTY   • BROW LIFT Bilateral 3/3/2022    Procedure: BILATERAL LATERAL BROWPEXY;  Surgeon: Edu Power MD;  [place]  [facility];  Service: Ophthalmology;  Laterality: Bilateral;   • CARDIAC CATHETERIZATION Bilateral 03/19/2012    Normal coronary arteries, severe mitral regurgitation and aortic regurgitation. EF was about 30-35%.; DR. JYOTI ROMERO AT [facility]   • CARDIAC ELECTROPHYSIOLOGY PROCEDURE Left 7/11/2016    Procedure: PPM battery change [employer];  Surgeon: Hunter Faust MD;  Location: [facility] CATH INVASIVE LOC[facility];  Service:    • CATARACT EXTRACTION Bilateral    • COLONOSCOPY N/A 10/14/2014    PANDIVERTICULOSIS IN ENTIRE COLON, DR. BRANDEN RUIZ AT [facility]   • COLONOSCOPY N/A 11/13/2019    SCATTERED SMALL AND LARGE DIVERTICULA IN ENTIRE COLON, RESCOPE IN 10 YRS, DR. PERCY MUHAMMAD AT [facility]   • ENDOSCOPY N/A 06/06/2012    CHAD-RAMOS TEAR WITH ACTIVE BLEEDINGBLOOD THROUGHOUT STOMACH, BLOOD IN DUODENUM, DR. TARUN GILLIS AT [facility]   • ESOPHAGUS SURGERY      TEAR DURING HEART SURGERY, AT BOTTOM OF ESOPHAGUS   • HYSTERECTOMY N/A 1980    OLIVIA WITH BSO   • IMPLANTABLE CARDIOVERTER DEFIBRILLATOR LEAD REPLACEMENT/POCKET REVISION N/A 08/28/2012     DR. GISELLE BOOGIE AT [facility]   • MITRAL VALVE REPAIR/REPLACEMENT N/A 06/06/2012    REMODELING  ANULOPLASTY USING 24 MM BARAJAS ANULOPLASTY RING, MODEL 5200, SERIAL # [id number], DR. VINCE ZAMUDIO AT [facility]   • PATENT FORAMEN OVALE CLOSURE N/A 06/06/2012    DR. VINCE ZAMUDIO AT [facility]   • TONSILLECTOMY Bilateral    • TRICUSPID VALVE SURGERY N/A 06/06/2012    REMODELING ANULOPLASTY USING 26 MM BARAJAS ANULOPLASTY RING, MODEL 6200, SERIAL # [id number], DR. VINCE ZAMUDIO AT [facility]   • VAGINAL DELIVERY N/A 1974       FAMILY HISTORY  Family History   Problem Relation Age of Onset   • Diabetes Mother    • Hypertension Mother    • Emphysema Father    • Anxiety disorder Father    • Depression Father    • Hypertension Father    • Stroke Sister    • No Known Problems Brother    • Heart attack Maternal Grandmother    • Sleep apnea Maternal Grandmother    • No Known Problems Maternal Grandfather    • No Known Problems Paternal Grandmother    • No Known Problems Paternal Grandfather    • No Known Problems Brother    • No Known Problems Daughter    • Malig Hyperthermia Neg Hx        SOCIAL HISTORY  Social History     Socioeconomic History   • Marital status:    Tobacco Use   • Smoking status: Never   • Smokeless tobacco: Never   Vaping Use   • Vaping Use: Never used   Substance and Sexual Activity   • Alcohol use: Yes     Comment: WINE OCCASIONAL   • Drug use: No   • Sexual activity: Defer     Birth control/protection: Post-menopausal, Surgical       ALLERGIES  Sulfa antibiotics    REVIEW OF SYSTEMS  Review of Systems    PHYSICAL EXAM  ED Triage Vitals   Temp Heart Rate Resp BP SpO2   02/02/23 1648 02/02/23 1648 02/02/23 1648 02/02/23 1706 02/02/23 1648   98.4 °F (36.9 °C) 73 16 124/74 93 %      Temp src Heart Rate Source Patient Position BP Location FiO2 (%)   02/02/23 1648 02/02/23 1648 -- -- --   Tympanic Monitor          Physical Exam  Vitals and nursing note reviewed.   Constitutional:       General: She is not in acute  distress.     Appearance: Normal appearance. She is not ill-appearing or toxic-appearing.   HENT:      Head: Normocephalic and atraumatic.   Cardiovascular:      Rate and Rhythm: Normal rate and regular rhythm.      Pulses:           Posterior tibial pulses are 2+ on the right side and 2+ on the left side.      Heart sounds: Normal heart sounds. No murmur heard.  Pulmonary:      Effort: Pulmonary effort is normal. No respiratory distress.      Breath sounds: Normal breath sounds.   Abdominal:      General: Bowel sounds are normal.      Palpations: Abdomen is soft.      Tenderness: There is no abdominal tenderness. There is no guarding or rebound.   Genitourinary:     Rectum: Guaiac result negative.   Musculoskeletal:         General: Normal range of motion.      Cervical back: Normal range of motion and neck supple.   Skin:     General: Skin is warm and dry.   Neurological:      Mental Status: She is alert and oriented to person, place, and time.   Psychiatric:         Mood and Affect: Mood and affect normal.         LAB RESULTS  Recent Results (from the past 24 hour(s))   Comprehensive Metabolic Panel    Collection Time: 02/02/23  5:03 PM    Specimen: Blood   Result Value Ref Range    Glucose 111 (H) 65 - 99 mg/dL    BUN 19 8 - 23 mg/dL    Creatinine 1.07 (H) 0.57 - 1.00 mg/dL    Sodium 132 (L) 136 - 145 mmol/L    Potassium 4.8 3.5 - 5.2 mmol/L    Chloride 99 98 - 107 mmol/L    CO2 23.0 22.0 - 29.0 mmol/L    Calcium 9.6 8.6 - 10.5 mg/dL    Total Protein 7.1 6.0 - 8.5 g/dL    Albumin 4.5 3.5 - 5.2 g/dL    ALT (SGPT) 18 1 - 33 U/L    AST (SGOT) 20 1 - 32 U/L    Alkaline Phosphatase 85 39 - 117 U/L    Total Bilirubin 0.5 0.0 - 1.2 mg/dL    Globulin 2.6 gm/dL    A/G Ratio 1.7 g/dL    BUN/Creatinine Ratio 17.8 7.0 - 25.0    Anion Gap 10.0 5.0 - 15.0 mmol/L    eGFR 53.9 (L) >60.0 mL/min/1.73   Protime-INR    Collection Time: 02/02/23  5:03 PM    Specimen: Blood   Result Value Ref Range    Protime 13.8 11.7 - 14.2 Seconds     INR 1.04 0.90 - 1.10   Type & Screen    Collection Time: 02/02/23  5:03 PM    Specimen: Blood   Result Value Ref Range    ABO Type A     RH type Positive     Antibody Screen Negative     T&S Expiration Date 2/5/2023 11:59:59 PM    Green Top (Gel)    Collection Time: 02/02/23  5:03 PM   Result Value Ref Range    Extra Tube Hold for add-ons.    Lavender Top    Collection Time: 02/02/23  5:03 PM   Result Value Ref Range    Extra Tube hold for add-on    Gold Top - SST    Collection Time: 02/02/23  5:03 PM   Result Value Ref Range    Extra Tube Hold for add-ons.    Light Blue Top    Collection Time: 02/02/23  5:03 PM   Result Value Ref Range    Extra Tube Hold for add-ons.    CBC Auto Differential    Collection Time: 02/02/23  5:03 PM    Specimen: Blood   Result Value Ref Range    WBC 7.22 3.40 - 10.80 10*3/mm3    RBC 4.09 3.77 - 5.28 10*6/mm3    Hemoglobin 13.2 12.0 - 15.9 g/dL    Hematocrit 38.7 34.0 - 46.6 %    MCV 94.6 79.0 - 97.0 fL    MCH 32.3 26.6 - 33.0 pg    MCHC 34.1 31.5 - 35.7 g/dL    RDW 11.8 (L) 12.3 - 15.4 %    RDW-SD 40.6 37.0 - 54.0 fl    MPV 9.0 6.0 - 12.0 fL    Platelets 256 140 - 450 10*3/mm3    Neutrophil % 69.9 42.7 - 76.0 %    Lymphocyte % 17.9 (L) 19.6 - 45.3 %    Monocyte % 10.0 5.0 - 12.0 %    Eosinophil % 1.4 0.3 - 6.2 %    Basophil % 0.4 0.0 - 1.5 %    Immature Grans % 0.4 0.0 - 0.5 %    Neutrophils, Absolute 5.05 1.70 - 7.00 10*3/mm3    Lymphocytes, Absolute 1.29 0.70 - 3.10 10*3/mm3    Monocytes, Absolute 0.72 0.10 - 0.90 10*3/mm3    Eosinophils, Absolute 0.10 0.00 - 0.40 10*3/mm3    Basophils, Absolute 0.03 0.00 - 0.20 10*3/mm3    Immature Grans, Absolute 0.03 0.00 - 0.05 10*3/mm3    nRBC 0.0 0.0 - 0.2 /100 WBC   POC Occult Blood Stool    Collection Time: 02/02/23  6:27 PM    Specimen: Per Rectum; Stool   Result Value Ref Range    Fecal Occult Blood Negative Negative    Lot Number [id number]     Expiration Date 7/31/2023     Positive Control Positive Positive    Negative Control Negative  Negative   Urinalysis With Microscopic If Indicated (No Culture) - Urine, Clean Catch    Collection Time: 02/02/23  6:46 PM    Specimen: Urine, Clean Catch   Result Value Ref Range    Color, UA Yellow Yellow, Straw    Appearance, UA Clear Clear    pH, UA 5.5 5.0 - 8.0    Specific Gravity, UA 1.006 1.005 - 1.030    Glucose, UA Negative Negative    Ketones, UA Negative Negative    Bilirubin, UA Negative Negative    Blood, UA Trace (A) Negative    Protein, UA Negative Negative    Leuk Esterase, UA Negative Negative    Nitrite, UA Negative Negative    Urobilinogen, UA 0.2 E.U./dL 0.2 - 1.0 E.U./dL   Urinalysis, Microscopic Only - Urine, Clean Catch    Collection Time: 02/02/23  6:46 PM    Specimen: Urine, Clean Catch   Result Value Ref Range    RBC, UA 0-2 None Seen, 0-2 /HPF    WBC, UA 0-2 None Seen, 0-2 /HPF    Bacteria, UA None Seen None Seen /HPF    Squamous Epithelial Cells, UA 0-2 None Seen, 0-2 /HPF    Hyaline Casts, UA 0-2 None Seen /LPF    Methodology Automated Microscopy        I ordered the above labs and reviewed the results          PROCEDURES  Procedures      MEDICATIONS GIVEN IN THE EMERGENCY DEPARTMENT  Medications   ciprofloxacin (CIPRO) tablet 500 mg (500 mg Oral Given 2/2/23 1935)           MEDICAL DECISION MAKING  Results were reviewed/discussed with the patient and they were also made aware of online access. Pt also made aware that some labs, such as cultures, will not be resulted during ER visit and followup with PMD is necessary.   EKGs and labs independently viewed and interpreted by me.  Discussion below represents my analysis of pertinent findings related to patient's condition, differential diagnosis, treatment plan and final disposition.      Independent interpretation of labs, radiology studies, and discussions with consultants:         Shared decision making: Patient voices understanding of need to follow with GI, request Dr. Lenard schultz.  Patient given his information.  Patient reports she  is taking ciprofloxacin for previous stool culture which is significant for Pseudomonas patient voices understanding of need to modify ciprofloxacin to twice daily and that this prescription will be available at her pharmacy today.  She voices understanding of need to take probiotics, hydrate well, have adequate calorie intake and to return to the ER with lightheadedness, passing out, worsening abdominal pain fever, vomiting or other concerns.        MDM       DIAGNOSIS  Final diagnoses:   Diarrhea, unspecified type   Hyponatremia       DISPOSITION  DISCHARGE    Patient discharged in stable condition.    Reviewed implications of results, diagnosis, meds, responsibility to follow up, warning signs and symptoms of possible worsening, potential complications and reasons to return to ER    Patient/Family voiced understanding of above instructions.    Discussed plan for discharge, as there is no emergent indication for admission. Patient referred to primary care provider for BP management due to today's BP. Pt/family is agreeable and understands need for follow up and repeat testing.  Pt is aware that discharge does not mean that nothing is wrong but it indicates no emergency is present that requires admission and they must continue care with follow-up as given below or physician of their choice.     FOLLOW-UP  Danuta Lynne MD  [address]  [address]  [place] [id number] [phone]    Schedule an appointment as soon as possible for a visit in 3 days      Lenard Jacob MD  [address]  [place] [id number] [phone]    Schedule an appointment as soon as possible for a visit in 1 week  EVEN IF WELL         Medication List      New Prescriptions    ciprofloxacin 500 MG tablet  Commonly known as: Cipro  Take 1 tablet by mouth 2 (Two) Times a Day.        Changed    furosemide 40 MG tablet  Commonly known as: LASIX  TAKE ONE TABLET BY MOUTH DAILY  What changed:   · how much to  take  · additional instructions        Stop    methylPREDNISolone 4 MG dose pack  Commonly known as: MEDROL           Where to Get Your Medications      These medications were sent to [facility]/pharmacy #[id number] - [place], [place] - [address] - [phone]  - [phone] FX  [address], [place] [id number]    Phone: [phone]   · ciprofloxacin 500 MG tablet           Latest Documented Vital Signs:  As of 22:00 EST  BP- 137/83 HR- 64 Temp- 98.4 °F (36.9 °C) (Tympanic) O2 sat- 96%    --  Full protective equipment was worn throughout this patient encounter including a face mask, eye protection and gloves. Hand hygiene was performed before donning protective equipment and after removal when leaving the room.     Please note that portions of this note were completed with a voice recognition program.       Note Disclaimer: At [facility], we believe that sharing information builds trust and better relationships. You are receiving this note because you are receiving care at [facility] or recently visited. It is possible you will see health information before a provider has talked with you about it. This kind of information can be easy to misunderstand. To help you fully understand what it means for your health, we urge you to discuss this note with your provider.     Maddy Cervantes MD  02/02/23 0631

## 2023-02-09 ENCOUNTER — APPOINTMENT (OUTPATIENT)
Dept: GENERAL RADIOLOGY | Facility: HOSPITAL | Age: 78
End: 2023-02-09
Payer: MEDICARE

## 2023-02-09 ENCOUNTER — HOSPITAL ENCOUNTER (OUTPATIENT)
Facility: HOSPITAL | Age: 78
Discharge: HOME | End: 2023-02-09
Attending: EMERGENCY MEDICINE | Admitting: EMERGENCY MEDICINE
Payer: MEDICARE

## 2023-02-09 ENCOUNTER — APPOINTMENT (OUTPATIENT)
Dept: CT IMAGING | Facility: HOSPITAL | Age: 78
End: 2023-02-09
Payer: MEDICARE

## 2023-02-09 ENCOUNTER — APPOINTMENT (OUTPATIENT)
Dept: CARDIOLOGY | Facility: HOSPITAL | Age: 78
End: 2023-02-09
Payer: MEDICARE

## 2023-02-09 VITALS
TEMPERATURE: 98.1 F | HEIGHT: 62 IN | WEIGHT: 174.3 LBS | DIASTOLIC BLOOD PRESSURE: 82 MMHG | OXYGEN SATURATION: 99 % | SYSTOLIC BLOOD PRESSURE: 120 MMHG | BODY MASS INDEX: 32.07 KG/M2 | HEART RATE: 67 BPM | RESPIRATION RATE: 16 BRPM

## 2023-02-09 DIAGNOSIS — R20.2 PARESTHESIA AND PAIN OF RIGHT EXTREMITY: ICD-10-CM

## 2023-02-09 DIAGNOSIS — M21.331 RIGHT WRIST DROP: ICD-10-CM

## 2023-02-09 DIAGNOSIS — R29.898 WEAKNESS OF RIGHT UPPER EXTREMITY: ICD-10-CM

## 2023-02-09 DIAGNOSIS — R29.90 STROKE-LIKE SYMPTOMS: Primary | ICD-10-CM

## 2023-02-09 DIAGNOSIS — M79.609 PARESTHESIA AND PAIN OF RIGHT EXTREMITY: ICD-10-CM

## 2023-02-09 DIAGNOSIS — M54.41 CHRONIC RIGHT-SIDED LOW BACK PAIN WITH RIGHT-SIDED SCIATICA: ICD-10-CM

## 2023-02-09 DIAGNOSIS — G89.29 CHRONIC RIGHT-SIDED LOW BACK PAIN WITH RIGHT-SIDED SCIATICA: ICD-10-CM

## 2023-02-09 LAB
ABO GROUP BLD: NORMAL
ALBUMIN SERPL-MCNC: 4.6 G/DL (ref 3.5–5.2)
ALBUMIN/GLOB SERPL: 1.8 G/DL
ALP SERPL-CCNC: 71 U/L (ref 39–117)
ALT SERPL W P-5'-P-CCNC: 18 U/L (ref 1–33)
ANION GAP SERPL CALCULATED.3IONS-SCNC: 6.7 MMOL/L (ref 5–15)
APTT PPP: 25.6 SECONDS (ref 22.7–35.4)
AST SERPL-CCNC: 14 U/L (ref 1–32)
BASOPHILS # BLD AUTO: 0.03 10*3/MM3 (ref 0–0.2)
BASOPHILS NFR BLD AUTO: 0.5 % (ref 0–1.5)
BH CV UPPER ARTERIAL LEFT 2ND DIGIT SYS MAX: 127
BH CV UPPER ARTERIAL LEFT FBI 2ND DIGIT RATIO: 1.02
BH CV UPPER ARTERIAL LEFT WBI RATIO: 1.12
BH CV UPPER ARTERIAL RIGHT 2ND DIGIT SYS MAX: 127
BH CV UPPER ARTERIAL RIGHT FBI 2ND DIGIT RATIO: 1.02
BH CV UPPER ARTERIAL RIGHT WBI RATIO: 1.18
BILIRUB SERPL-MCNC: 0.4 MG/DL (ref 0–1.2)
BLD GP AB SCN SERPL QL: NEGATIVE
BUN SERPL-MCNC: 27 MG/DL (ref 8–23)
BUN/CREAT SERPL: 31.8 (ref 7–25)
CALCIUM SPEC-SCNC: 9.3 MG/DL (ref 8.6–10.5)
CHLORIDE SERPL-SCNC: 105 MMOL/L (ref 98–107)
CO2 SERPL-SCNC: 25.3 MMOL/L (ref 22–29)
CREAT SERPL-MCNC: 0.85 MG/DL (ref 0.57–1)
DEPRECATED RDW RBC AUTO: 42.7 FL (ref 37–54)
EGFRCR SERPLBLD CKD-EPI 2021: 71.1 ML/MIN/1.73
EOSINOPHIL # BLD AUTO: 0.17 10*3/MM3 (ref 0–0.4)
EOSINOPHIL NFR BLD AUTO: 3.1 % (ref 0.3–6.2)
ERYTHROCYTE [DISTWIDTH] IN BLOOD BY AUTOMATED COUNT: 12.1 % (ref 12.3–15.4)
GEN 5 2HR TROPONIN T REFLEX: <6 NG/L
GLOBULIN UR ELPH-MCNC: 2.6 GM/DL
GLUCOSE SERPL-MCNC: 115 MG/DL (ref 65–99)
HCT VFR BLD AUTO: 39.4 % (ref 34–46.6)
HGB BLD-MCNC: 13 G/DL (ref 12–15.9)
HOLD SPECIMEN: NORMAL
HOLD SPECIMEN: NORMAL
IMM GRANULOCYTES # BLD AUTO: 0.02 10*3/MM3 (ref 0–0.05)
IMM GRANULOCYTES NFR BLD AUTO: 0.4 % (ref 0–0.5)
INR PPP: 0.94 (ref 0.9–1.1)
LYMPHOCYTES # BLD AUTO: 1.24 10*3/MM3 (ref 0.7–3.1)
LYMPHOCYTES NFR BLD AUTO: 22.3 % (ref 19.6–45.3)
MAGNESIUM SERPL-MCNC: 2.2 MG/DL (ref 1.6–2.4)
MAXIMAL PREDICTED HEART RATE: 144 BPM
MCH RBC QN AUTO: 31.9 PG (ref 26.6–33)
MCHC RBC AUTO-ENTMCNC: 33 G/DL (ref 31.5–35.7)
MCV RBC AUTO: 96.6 FL (ref 79–97)
MONOCYTES # BLD AUTO: 0.58 10*3/MM3 (ref 0.1–0.9)
MONOCYTES NFR BLD AUTO: 10.4 % (ref 5–12)
NEUTROPHILS NFR BLD AUTO: 3.53 10*3/MM3 (ref 1.7–7)
NEUTROPHILS NFR BLD AUTO: 63.3 % (ref 42.7–76)
NRBC BLD AUTO-RTO: 0 /100 WBC (ref 0–0.2)
PLATELET # BLD AUTO: 236 10*3/MM3 (ref 140–450)
PMV BLD AUTO: 9 FL (ref 6–12)
POTASSIUM SERPL-SCNC: 4.6 MMOL/L (ref 3.5–5.2)
PROT SERPL-MCNC: 7.2 G/DL (ref 6–8.5)
PROTHROMBIN TIME: 12.7 SECONDS (ref 11.7–14.2)
QT INTERVAL: 473 MS
RBC # BLD AUTO: 4.08 10*6/MM3 (ref 3.77–5.28)
RH BLD: POSITIVE
SODIUM SERPL-SCNC: 137 MMOL/L (ref 136–145)
STRESS TARGET HR: 122 BPM
T&S EXPIRATION DATE: NORMAL
TROPONIN T DELTA: NORMAL
TROPONIN T SERPL HS-MCNC: 11 NG/L
UPPER ARTERIAL LEFT ARM BRACHIAL SYS MAX: 120 MMHG
UPPER ARTERIAL LEFT ARM RADIAL SYS MAX: 139 MMHG
UPPER ARTERIAL LEFT ARM ULNAR SYS MAX: 132 MMHG
UPPER ARTERIAL RIGHT ARM BRACHIAL SYS MAX: 124 MMHG
UPPER ARTERIAL RIGHT ARM RADIAL SYS MAX: 146 MMHG
UPPER ARTERIAL RIGHT ARM ULNAR SYS MAX: 144 MMHG
WBC NRBC COR # BLD: 5.57 10*3/MM3 (ref 3.4–10.8)
WHOLE BLOOD HOLD COAG: NORMAL
WHOLE BLOOD HOLD SPECIMEN: NORMAL

## 2023-02-09 PROCEDURE — 86900 BLOOD TYPING SEROLOGIC ABO: CPT | Performed by: NURSE PRACTITIONER

## 2023-02-09 PROCEDURE — 86901 BLOOD TYPING SEROLOGIC RH(D): CPT | Performed by: NURSE PRACTITIONER

## 2023-02-09 PROCEDURE — 99204 OFFICE O/P NEW MOD 45 MIN: CPT | Performed by: PSYCHIATRY & NEUROLOGY

## 2023-02-09 PROCEDURE — 63710000001 CARVEDILOL 25 MG TABLET: Performed by: STUDENT IN AN ORGANIZED HEALTH CARE EDUCATION/TRAINING PROGRAM

## 2023-02-09 PROCEDURE — 85730 THROMBOPLASTIN TIME PARTIAL: CPT | Performed by: NURSE PRACTITIONER

## 2023-02-09 PROCEDURE — 93922 UPR/L XTREMITY ART 2 LEVELS: CPT

## 2023-02-09 PROCEDURE — 70496 CT ANGIOGRAPHY HEAD: CPT

## 2023-02-09 PROCEDURE — G0378 HOSPITAL OBSERVATION PER HR: HCPCS

## 2023-02-09 PROCEDURE — 80053 COMPREHEN METABOLIC PANEL: CPT | Performed by: NURSE PRACTITIONER

## 2023-02-09 PROCEDURE — A9270 NON-COVERED ITEM OR SERVICE: HCPCS | Performed by: STUDENT IN AN ORGANIZED HEALTH CARE EDUCATION/TRAINING PROGRAM

## 2023-02-09 PROCEDURE — 70498 CT ANGIOGRAPHY NECK: CPT

## 2023-02-09 PROCEDURE — 63710000001 CIPROFLOXACIN 500 MG TABLET: Performed by: STUDENT IN AN ORGANIZED HEALTH CARE EDUCATION/TRAINING PROGRAM

## 2023-02-09 PROCEDURE — 99284 EMERGENCY DEPT VISIT MOD MDM: CPT

## 2023-02-09 PROCEDURE — 71045 X-RAY EXAM CHEST 1 VIEW: CPT

## 2023-02-09 PROCEDURE — 63710000001 BUPROPION XL 150 MG TABLET SUSTAINED-RELEASE 24 HOUR: Performed by: STUDENT IN AN ORGANIZED HEALTH CARE EDUCATION/TRAINING PROGRAM

## 2023-02-09 PROCEDURE — 85025 COMPLETE CBC W/AUTO DIFF WBC: CPT | Performed by: NURSE PRACTITIONER

## 2023-02-09 PROCEDURE — 93010 ELECTROCARDIOGRAM REPORT: CPT | Performed by: STUDENT IN AN ORGANIZED HEALTH CARE EDUCATION/TRAINING PROGRAM

## 2023-02-09 PROCEDURE — 0 IOPAMIDOL PER 1 ML: Performed by: EMERGENCY MEDICINE

## 2023-02-09 PROCEDURE — 84484 ASSAY OF TROPONIN QUANT: CPT | Performed by: NURSE PRACTITIONER

## 2023-02-09 PROCEDURE — 86850 RBC ANTIBODY SCREEN: CPT | Performed by: NURSE PRACTITIONER

## 2023-02-09 PROCEDURE — 93005 ELECTROCARDIOGRAM TRACING: CPT | Performed by: NURSE PRACTITIONER

## 2023-02-09 PROCEDURE — 83735 ASSAY OF MAGNESIUM: CPT | Performed by: NURSE PRACTITIONER

## 2023-02-09 PROCEDURE — 85610 PROTHROMBIN TIME: CPT | Performed by: NURSE PRACTITIONER

## 2023-02-09 PROCEDURE — 63710000001 ATORVASTATIN 20 MG TABLET: Performed by: STUDENT IN AN ORGANIZED HEALTH CARE EDUCATION/TRAINING PROGRAM

## 2023-02-09 RX ORDER — SODIUM CHLORIDE 0.9 % (FLUSH) 0.9 %
10 SYRINGE (ML) INJECTION AS NEEDED
Status: DISCONTINUED | OUTPATIENT
Start: 2023-02-09 | End: 2023-02-10 | Stop reason: HOSPADM

## 2023-02-09 RX ORDER — SODIUM CHLORIDE 0.9 % (FLUSH) 0.9 %
10 SYRINGE (ML) INJECTION EVERY 12 HOURS SCHEDULED
Status: DISCONTINUED | OUTPATIENT
Start: 2023-02-09 | End: 2023-02-10 | Stop reason: HOSPADM

## 2023-02-09 RX ORDER — NITROGLYCERIN 0.4 MG/1
0.4 TABLET SUBLINGUAL
Status: DISCONTINUED | OUTPATIENT
Start: 2023-02-09 | End: 2023-02-10 | Stop reason: HOSPADM

## 2023-02-09 RX ORDER — ONDANSETRON 4 MG/1
4 TABLET, FILM COATED ORAL EVERY 6 HOURS PRN
Status: DISCONTINUED | OUTPATIENT
Start: 2023-02-09 | End: 2023-02-10 | Stop reason: HOSPADM

## 2023-02-09 RX ORDER — SODIUM CHLORIDE 9 MG/ML
40 INJECTION, SOLUTION INTRAVENOUS AS NEEDED
Status: DISCONTINUED | OUTPATIENT
Start: 2023-02-09 | End: 2023-02-10 | Stop reason: HOSPADM

## 2023-02-09 RX ORDER — ACETAMINOPHEN 325 MG/1
650 TABLET ORAL EVERY 4 HOURS PRN
Status: DISCONTINUED | OUTPATIENT
Start: 2023-02-09 | End: 2023-02-10 | Stop reason: HOSPADM

## 2023-02-09 RX ORDER — BUPROPION HYDROCHLORIDE 150 MG/1
300 TABLET ORAL DAILY
Status: DISCONTINUED | OUTPATIENT
Start: 2023-02-09 | End: 2023-02-10 | Stop reason: HOSPADM

## 2023-02-09 RX ORDER — CARVEDILOL 25 MG/1
25 TABLET ORAL 2 TIMES DAILY WITH MEALS
Status: DISCONTINUED | OUTPATIENT
Start: 2023-02-09 | End: 2023-02-10 | Stop reason: HOSPADM

## 2023-02-09 RX ORDER — ESCITALOPRAM OXALATE 10 MG/1
10 TABLET ORAL DAILY
Status: DISCONTINUED | OUTPATIENT
Start: 2023-02-09 | End: 2023-02-10 | Stop reason: HOSPADM

## 2023-02-09 RX ORDER — CIPROFLOXACIN 500 MG/1
500 TABLET, FILM COATED ORAL 2 TIMES DAILY
Status: DISCONTINUED | OUTPATIENT
Start: 2023-02-09 | End: 2023-02-10 | Stop reason: HOSPADM

## 2023-02-09 RX ORDER — SPIRONOLACTONE 25 MG/1
25 TABLET ORAL DAILY
Status: DISCONTINUED | OUTPATIENT
Start: 2023-02-09 | End: 2023-02-10 | Stop reason: HOSPADM

## 2023-02-09 RX ORDER — ATORVASTATIN CALCIUM 20 MG/1
40 TABLET, FILM COATED ORAL DAILY
Status: DISCONTINUED | OUTPATIENT
Start: 2023-02-09 | End: 2023-02-10 | Stop reason: HOSPADM

## 2023-02-09 RX ORDER — CHOLECALCIFEROL (VITAMIN D3) 125 MCG
5 CAPSULE ORAL NIGHTLY PRN
Status: DISCONTINUED | OUTPATIENT
Start: 2023-02-09 | End: 2023-02-10 | Stop reason: HOSPADM

## 2023-02-09 RX ORDER — ONDANSETRON 2 MG/ML
4 INJECTION INTRAMUSCULAR; INTRAVENOUS EVERY 6 HOURS PRN
Status: DISCONTINUED | OUTPATIENT
Start: 2023-02-09 | End: 2023-02-10 | Stop reason: HOSPADM

## 2023-02-09 RX ADMIN — ATORVASTATIN CALCIUM 40 MG: 20 TABLET, FILM COATED ORAL at 17:55

## 2023-02-09 RX ADMIN — Medication 10 ML: at 15:01

## 2023-02-09 RX ADMIN — BUPROPION HYDROCHLORIDE 300 MG: 150 TABLET, EXTENDED RELEASE ORAL at 17:56

## 2023-02-09 RX ADMIN — CIPROFLOXACIN 500 MG: 500 TABLET, FILM COATED ORAL at 17:55

## 2023-02-09 RX ADMIN — CARVEDILOL 25 MG: 25 TABLET, FILM COATED ORAL at 17:55

## 2023-02-09 RX ADMIN — IOPAMIDOL 95 ML: 755 INJECTION, SOLUTION INTRAVENOUS at 16:16

## 2023-02-09 NOTE — H&P
[facility]   HISTORY AND PHYSICAL    Patient Name: Olivia Addison  : 1945  MRN: [id number]  Primary Care Physician:  Danuta Lynne MD  Date of admission: 2023    Subjective   Subjective     Chief Complaint:   Chief Complaint   Patient presents with   • Numbness     Right hand numbness          HPI:    Olivia Addison is a 77 y.o. female with a history of paroxysmal atrial fibrillation, systolic CHF, history of V. tach with pacemaker in place, hypertension, hyperlipidemia, and sleep apnea on CPAP who presents to [facility] ER with numbness and tingling and weakness of the right upper extremity.  Patient states she woke up this morning and noted she had some numbness and tingling in her right hand.  She states that she had difficulty moving her hand and feels that she cannot control her wrist movements.  She states the weakness she feels goes all the way up to her elbow.  She states that she did not note sleeping on it strangely last night.  She states she has never had something like this happen before.  She denies any headache or visual changes.  Denies any difficulty with her speech or swallowing.  Denies chest pain or shortness of breath.  Denies abdominal pain and nausea.  Denies fevers and chills.  Patient states that she follows with Dr. Randall and has previously discussed starting anticoagulation however is not interested in starting on a blood thinner at this time.    In the ER, chest x-ray revealed no acute findings.  Bilateral arterial Doppler showed normal arterial pressures with normal digital pressures.  Neurology was consulted recommending further evaluation with neuroimaging.    Review of Systems   All systems were reviewed and negative except for: What is mentioned above in HPI.    Personal History     Past Medical History:   Diagnosis Date   • ADHD (attention deficit hyperactivity disorder)    • Adjustment disorder with anxiety    • Allergic rhinitis    •  Anxiety and depression    • Arthritis    • Atherosclerosis    • Atrial fibrillation (HCC)     HX   • Atrophic vaginitis    • Blepharitis of both eyes    • CAD (coronary artery disease)    • Cardiomyopathy ([facility])     NON ISCHEMIC    • Cataract    • CHF (congestive heart failure) ([facility])     CHRONIC SYSTOLIC   • CKD (chronic kidney disease) stage 3, GFR 30-59 ml/min ([facility])    • DDD (degenerative disc disease), cervical    • Diaphoresis 05/23/2019    SEEN AT [facility] UC   • Diverticulitis 09/02/2019    SEEN AT [facility] ER   • Diverticulitis 11/15/2016   • Diverticulitis 09/07/2014   • Diverticulitis    • Diverticulitis large intestine 05/23/2017   • Diverticulitis of colon 08/22/2019    ADMITTED TO [facility]   • Dry eye syndrome    • Dyspnea    • Dysuria 06/2019   • Esophageal injury     ESTRELLITA-BISWAS SYNDROME   • Fatigue    • Heart failure ([facility])    • Hematuria 08/2019   • History of frequent urinary tract infections    • History of mitral valve repair    • History of sepsis     FROM UTI   • Hypercholesterolemia    • Hyperlipidemia    • Hypertension    • Kidney cysts 07/2017    FOLLOWED BY DR. 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 HARMAN CÁRDENAS   • Segmental and somatic dysfunction of cervical region    • Segmental and somatic dysfunction of thoracic region    • SOB (shortness of breath) on exertion     R/T HEART   • Vitamin D deficiency    • VT (ventricular tachycardia)        Past Surgical History:   Procedure  Laterality Date   • APPENDECTOMY N/A    • BIVENTRICULLAR IMPLANTABLE CARDIOVERTER DEFIBRILLATOR PLACEMENT N/A 04/10/2012    [employer]TR[employer], BI-V, DR. MICKEY MORALES AT [facility]   • BLADDER SUSPENSION N/A    • BLEPHAROPLASTY Bilateral 3/3/2022    Procedure: BILATERAL UPPER LID BLEPHAROPLASTY;  Surgeon: Edu Power MD;  Location:  [facility];  Service: Ophthalmology;  Laterality: Bilateral;   • BLEPHAROPLASTY Bilateral 3/3/2022    Procedure: BILATERAL LOWER LID BLEPHAROPLASTY;  Surgeon: Edu Power MD;  Location:  [facility];  Service: New Image;  Laterality: Bilateral;   • BREAST CYST ASPIRATION     • BREAST SURGERY Bilateral     REDUCTION MAMMAPLASTY   • BROW LIFT Bilateral 3/3/2022    Procedure: BILATERAL LATERAL BROWPEXY;  Surgeon: Edu Power MD;  Location:  [facility];  Service: Ophthalmology;  Laterality: Bilateral;   • CARDIAC CATHETERIZATION Bilateral 03/19/2012    Normal coronary arteries, severe mitral regurgitation and aortic regurgitation. EF was about 30-35%.; DR. JYOTI ROMERO AT [facility]   • CARDIAC ELECTROPHYSIOLOGY PROCEDURE Left 7/11/2016    Procedure: PPM battery change [employer];  Surgeon: Hunter Faust MD;  Location: [facility] CATH INVASIVE LOCATION;  Service:    • CATARACT EXTRACTION Bilateral    • COLONOSCOPY N/A 10/14/2014    PANDIVERTICULOSIS IN ENTIRE COLON, DR. BRANDEN RUIZ AT [facility]   • COLONOSCOPY N/A 11/13/2019    SCATTERED SMALL AND LARGE DIVERTICULA IN ENTIRE COLON, RESCOPE IN 10 YRS, DR. PERCY MUHAMMAD AT [facility]   • ENDOSCOPY N/A 06/06/2012    CHAD-RAMOS TEAR WITH ACTIVE BLEEDINGBLOOD THROUGHOUT STOMACH, BLOOD IN DUODENUM, DR. TARUN GILLIS AT [facility]   • ESOPHAGUS SURGERY      TEAR DURING HEART SURGERY, AT BOTTOM OF ESOPHAGUS   • HYSTERECTOMY N/A 1980    OLIVIA WITH BSO   • IMPLANTABLE CARDIOVERTER DEFIBRILLATOR LEAD REPLACEMENT/POCKET REVISION N/A 08/28/2012    DR. GISELLE BOOGIE AT [facility]   • MITRAL VALVE REPAIR/REPLACEMENT N/A 06/06/2012    REMODELING   ANULOPLASTY USING 24 MM BARAJAS ANULOPLASTY RING, MODEL 5200, SERIAL # [id number], DR. VINCE ZAMUDIO AT [facility]   • PATENT FORAMEN OVALE CLOSURE N/A 06/06/2012    DR. VINCE ZAMUDIO AT [facility]   • TONSILLECTOMY Bilateral    • TRICUSPID VALVE SURGERY N/A 06/06/2012    REMODELING ANULOPLASTY USING 26 MM BARAJAS ANULOPLASTY RING, MODEL 6200, SERIAL # [id number], DR. VINCE ZAMUDIO AT [facility]   • VAGINAL DELIVERY N/A 1974       Family History: family history includes Anxiety disorder in her father; Depression in her father; Diabetes in her mother; Emphysema in her father; Heart attack in her maternal grandmother; Hypertension in her father and mother; No Known Problems in her brother, brother, daughter, maternal grandfather, paternal grandfather, and paternal grandmother; Sleep apnea in her maternal grandmother; Stroke in her sister. Otherwise pertinent FHx was reviewed and not pertinent to current issue.    Social History:  reports that she has never smoked. She has never used smokeless tobacco. She reports current alcohol use. She reports that she does not use drugs.    Home Medications:  acyclovir, atorvastatin, buPROPion XL, carvedilol, ciprofloxacin, escitalopram, sacubitril-valsartan, and spironolactone    Allergies:  Allergies   Allergen Reactions   • Sulfa Antibiotics Itching and Rash     Hands turned red w/a rash       Objective   Objective     Vitals:   Temp:  [97.6 °F (36.4 °C)-98.1 °F (36.7 °C)] 98.1 °F (36.7 °C)  Heart Rate:  [60-78] 69  Resp:  [16] 16  BP: (131-155)/(71-90) 131/71  Physical Exam    Constitutional: 77-year-old female, well-nourished, no acute distress on room air   Eyes: PERRLA, sclerae anicteric, no conjunctival injection   HENT: NCAT, mucous membranes moist   Neck: Supple, no thyromegaly, no lymphadenopathy, trachea midline   Respiratory: Clear to auscultation bilaterally, nonlabored respirations    Cardiovascular: RRR, no murmurs, rubs, or gallops, palpable pedal pulses bilaterally   Gastrointestinal:  Positive bowel sounds, soft, nontender, nondistended   Musculoskeletal: no bilateral ankle edema, no clubbing or cyanosis to extremities   Psychiatric: Appropriate affect, cooperative   Neurologic: Oriented x 3, Cranial Nerves grossly intact to confrontation, speech clear, no facial droop, positive right wrist drop, weak right hand , weakness of abduction and adduction of fingers and wrist, decreased strength of flexion of right elbow when compared to left   Skin: No rashes     Result Review    Result Review:  I have personally reviewed the results from the time of this admission to 2023 17:06 EST and agree with these findings:  [x]  Laboratory list / accordion  []  Microbiology  [x]  Radiology  [x]  EKG/Telemetry   []  Cardiology/Vascular   []  Pathology  [x]  Old records  []  Other:  Most notable findings include:     XR Chest 1 View    Result Date: 2023  XR CHEST 1 VW-  HISTORY: Female who is 77 years-old, stroke  TECHNIQUE: Frontal views of the chest  COMPARISON: 2020  FINDINGS: The heart size is borderline. Left-sided pacemaker, cardiac leads, sternotomy wires, cardiac valve marker noted. Pulmonary vasculature is unremarkable. Linear likely scarring or atelectasis in the right midlung. No focal pulmonary consolidation, pleural effusion, or pneumothorax. No acute osseous process.      No focal pulmonary consolidation. Borderline heart size. Follow-up as clinical indications persist.  This report was finalized on 2023 1:06 PM by Dr. Magno Herrera M.D.      XR Chest 2Vw    Result Date: 2023  REVIEWING YOUR TEST RESULTS IN [facility] IS NOT A SUBSTITUTE FOR DISCUSSING THOSE RESULTS WITH YOUR HEALTH CARE PROVIDER. PLEASE CONTACT YOUR PROVIDER VIA [facility] TO DISCUSS ANY QUESTIONS OR CONCERNS YOU MAY HAVE REGARDING THESE TEST RESULTS.  RADIOLOGY REPORT FACILITY:  [facility]/AGE/GENDER: [email or web address]  OP      AGE:77 Y          SEX:F PATIENT NAME/:  PAMELA  NEERAJ CORONEL    1945 UNIT NUMBER:  [id number] ACCOUNT NUMBER:  [id number] ACCESSION NUMBER:  [id number] VIEWS OF THE CHEST HISTORY: chest pain COMPARISON: [place] 3, 2019 TECHNIQUE: PA and lateral views were performed of the chest FINDINGS: Surgical changes are present from prior median sternotomy and cardiac valve repair. A multilead left-sided cardiac pacemaker/fibrillator is unchanged. The heart size is stable The lungs are clear. There is no pleural effusion or pneumothorax.    IMPRESSION: 1.No evidence of acute cardiopulmonary abnormality. Dictated by: Verna Hendrix M.D. Images and Report reviewed and interpreted by: Verna Hendrix M.D. <PS><Electronically signed by: Verna Hendrix M.D.> 01/25/2023 1356 D: 01/25/2023 1356 T: 01/25/2023 1356    Doppler Arterial Single Level Upper Extremity - Bilateral CAR    Result Date: 2/9/2023  •  Normal arterial pressures on the right. Normal digital pressures noted on the right. •  Normal arterial pressures on the left. Normal digital pressures noted on the left.       Assessment & Plan   Assessment / Plan     Brief Patient Summary:  Olivia Addison is a 77 y.o. female who is being admitted to the observation unit with weakness of the right upper extremity with plan for neuroimaging and neurology consultation.    Active Hospital Problems:  Active Hospital Problems    Diagnosis    • **Weakness of right upper extremity      Plan:     Weakness of the right upper extremity  -Right wrist drop  -Right wrist brace in place  -Patient's pacemaker is not compatible with MRI  -CTA of the head and neck pending  -Neurology consulted, neuroimaging negative we will plan for outpatient EMG  -Neurochecks every 4 hours  -Telemetry monitoring    Paroxysmal atrial fibrillation  -Sinus rhythm on exam  -Continue carvedilol  -Patient follows with Dr. Randall outpatient, advised her she will need to follow-up with him to discuss anticoagulation.  She states that she is not  interested in starting anticoagulation unless she has some sort of event to require it    Systolic heart failure  Hypertension  -Euvolemic on exam  -Continue carvedilol, spironolactone, Entresto    Hyperlipidemia  -Continue statin    Obstructive sleep apnea  -Patient uses CPAP with sleep  -Nocturnal O2 as needed    DVT prophylaxis:  Mechanical DVT prophylaxis orders are present.    CODE STATUS:    Level Of Support Discussed With: Patient  Code Status (Patient has no pulse and is not breathing): No CPR (Do Not Attempt to Resuscitate)  Medical Interventions (Patient has pulse or is breathing): Full Support  Release to patient: Routine Release    Admission Status:  I believe this patient meets observation status.    85 minutes have been spent by [facility] providers in the care of this patient while under observation status.    I have discussed plan of care with patient including advance care plan and/or surrogate decision maker.  Patient endorses understanding and desire for DNR.  Patient advises that their daughter, Tisha Felton will be their primary surrogate decision maker    I wore an face mask, eye protection, and gloves during this patient encounter. Patient also wearing a surgical mask. Hand hygeine performed before and after seeing the patient.      Electronically signed by Meghana Pérez PA-C, 02/09/23, 5:06 PM EST.

## 2023-02-09 NOTE — ED NOTES
Woke up this morning and could not move her right hand, she states it felt numb at first. Patient is able to hold her right arm up, but is not able to control her right hand. She states that she can feel her hand but it is very tingly.

## 2023-02-09 NOTE — PROGRESS NOTES
Patient sent for an upper extremity arterial doppler. Scan completed and preliminary report of within normal limits called to HUSSEIN Mosley.

## 2023-02-09 NOTE — ED PROVIDER NOTES
MD ATTESTATION NOTE    The MORA and I have discussed this patient's history, physical exam, and treatment plan.  I have reviewed the documentation and personally had a face to face interaction with the patient. I affirm the documentation and agree with the treatment and plan.  The attached note describes my personal findings.    I provided a substantive portion of the care of this patient. I personally performed the physical exam, in its entirety.    Independent Historians: Patient, neighbor, family    A complete HPI/ROS/PMH/PSH/SH/FH are unobtainable due to: Nothing    Chronic or social conditions impacting patient care (social determinants of health): None    Olivia Addison is a 77 y.o. female who presents to the ED c/o right-sided arm weakness.  She reports she woke up this morning with right-sided weakness.  She states she has never had similar episodes in the past.  She denies any trauma.  Nothing makes it worse or better.  She does reports that she recently had diarrhea and was placed on Cipro for diarrheal illness.      Review of prior external notes (non-ED): Cardiology note dated 2/24/2022 with nonischemic cardiomyopathy, tricuspid valve repair, mitral valve repair, left bundle branch block    Review of prior external test results outside of this encounter: CT of the head dated 1/2/2020 showed no acute findings.  Last set of chemistries was on 2/2/2023 and showed a mildly elevated creatinine of 1.07.    On exam:  GENERAL: Awake, alert, no acute distress  SKIN: Warm, dry  HENT: Normocephalic, atraumatic  EYES: no scleral icterus  CV: regular rhythm, regular rate  RESPIRATORY: normal effort, lungs clear  ABDOMEN: soft, nontender, nondistended  MUSCULOSKELETAL: no deformity  NEURO: alert, moves all extremities, follows commands.  Right upper extremity with right wrist drop.  No other findings    Labs  Recent Results (from the past 24 hour(s))   Comprehensive Metabolic Panel    Collection Time: 02/09/23 10:08  AM    Specimen: Blood   Result Value Ref Range    Glucose 115 (H) 65 - 99 mg/dL    BUN 27 (H) 8 - 23 mg/dL    Creatinine 0.85 0.57 - 1.00 mg/dL    Sodium 137 136 - 145 mmol/L    Potassium 4.6 3.5 - 5.2 mmol/L    Chloride 105 98 - 107 mmol/L    CO2 25.3 22.0 - 29.0 mmol/L    Calcium 9.3 8.6 - 10.5 mg/dL    Total Protein 7.2 6.0 - 8.5 g/dL    Albumin 4.6 3.5 - 5.2 g/dL    ALT (SGPT) 18 1 - 33 U/L    AST (SGOT) 14 1 - 32 U/L    Alkaline Phosphatase 71 39 - 117 U/L    Total Bilirubin 0.4 0.0 - 1.2 mg/dL    Globulin 2.6 gm/dL    A/G Ratio 1.8 g/dL    BUN/Creatinine Ratio 31.8 (H) 7.0 - 25.0    Anion Gap 6.7 5.0 - 15.0 mmol/L    eGFR 71.1 >60.0 mL/min/1.73   Protime-INR    Collection Time: 02/09/23 10:08 AM    Specimen: Blood   Result Value Ref Range    Protime 12.7 11.7 - 14.2 Seconds    INR 0.94 0.90 - 1.10   aPTT    Collection Time: 02/09/23 10:08 AM    Specimen: Blood   Result Value Ref Range    PTT 25.6 22.7 - 35.4 seconds   High Sensitivity Troponin T    Collection Time: 02/09/23 10:08 AM    Specimen: Blood   Result Value Ref Range    HS Troponin T 11 (H) <10 ng/L   Type & Screen    Collection Time: 02/09/23 10:08 AM    Specimen: Blood   Result Value Ref Range    ABO Type A     RH type Positive     Antibody Screen Negative     T&S Expiration Date 2/12/2023 11:59:59 PM    Green Top (Gel)    Collection Time: 02/09/23 10:08 AM   Result Value Ref Range    Extra Tube Hold for add-ons.    Lavender Top    Collection Time: 02/09/23 10:08 AM   Result Value Ref Range    Extra Tube hold for add-on    Gold Top - SST    Collection Time: 02/09/23 10:08 AM   Result Value Ref Range    Extra Tube Hold for add-ons.    Light Blue Top    Collection Time: 02/09/23 10:08 AM   Result Value Ref Range    Extra Tube Hold for add-ons.    CBC Auto Differential    Collection Time: 02/09/23 10:08 AM    Specimen: Blood   Result Value Ref Range    WBC 5.57 3.40 - 10.80 10*3/mm3    RBC 4.08 3.77 - 5.28 10*6/mm3    Hemoglobin 13.0 12.0 - 15.9 g/dL     Hematocrit 39.4 34.0 - 46.6 %    MCV 96.6 79.0 - 97.0 fL    MCH 31.9 26.6 - 33.0 pg    MCHC 33.0 31.5 - 35.7 g/dL    RDW 12.1 (L) 12.3 - 15.4 %    RDW-SD 42.7 37.0 - 54.0 fl    MPV 9.0 6.0 - 12.0 fL    Platelets 236 140 - 450 10*3/mm3    Neutrophil % 63.3 42.7 - 76.0 %    Lymphocyte % 22.3 19.6 - 45.3 %    Monocyte % 10.4 5.0 - 12.0 %    Eosinophil % 3.1 0.3 - 6.2 %    Basophil % 0.5 0.0 - 1.5 %    Immature Grans % 0.4 0.0 - 0.5 %    Neutrophils, Absolute 3.53 1.70 - 7.00 10*3/mm3    Lymphocytes, Absolute 1.24 0.70 - 3.10 10*3/mm3    Monocytes, Absolute 0.58 0.10 - 0.90 10*3/mm3    Eosinophils, Absolute 0.17 0.00 - 0.40 10*3/mm3    Basophils, Absolute 0.03 0.00 - 0.20 10*3/mm3    Immature Grans, Absolute 0.02 0.00 - 0.05 10*3/mm3    nRBC 0.0 0.0 - 0.2 /100 WBC   Magnesium    Collection Time: 02/09/23 10:08 AM    Specimen: Blood   Result Value Ref Range    Magnesium 2.2 1.6 - 2.4 mg/dL   Doppler Arterial Single Level Upper Extremity - Bilateral CAR    Collection Time: 02/09/23 11:33 AM   Result Value Ref Range    Target HR (85%) 122 bpm    Max. Pred. HR (100%) 144 bpm    Upper arterial right arm brachial sys max 124 mmHg    Upper arterial left arm brachial sys max 120 mmHg    Upper arterial right arm radial sys max 146 mmHg    Upper arterial left arm radial sys max 139 mmHg    Upper arterial right arm ulnar sys max 144 mmHg    Upper arterial left arm ulnar sys max 132 mmHg    RIGHT 2ND DIGIT SYS      LEFT 2ND DIGIT SYS      RIGHT WBI RATIO 1.18     LEFT WBI RATIO 1.12     RIGHT FBI 2ND DIGIT RATIO 1.02     LEFT FBI 2ND DIGIT RATIO 1.02    ECG 12 Lead Stroke Evaluation    Collection Time: 02/09/23 12:16 PM   Result Value Ref Range    QT Interval 473 ms   High Sensitivity Troponin T 2Hr    Collection Time: 02/09/23  3:00 PM    Specimen: Blood   Result Value Ref Range    HS Troponin T <6 <10 ng/L    Troponin T Delta         Radiology  XR Chest 1 View    Result Date: 2/9/2023  XR CHEST 1 VW-  HISTORY:  Female who is 77 years-old, stroke  TECHNIQUE: Frontal views of the chest  COMPARISON: 01/02/2020  FINDINGS: The heart size is borderline. Left-sided pacemaker, cardiac leads, sternotomy wires, cardiac valve marker noted. Pulmonary vasculature is unremarkable. Linear likely scarring or atelectasis in the right midlung. No focal pulmonary consolidation, pleural effusion, or pneumothorax. No acute osseous process.      No focal pulmonary consolidation. Borderline heart size. Follow-up as clinical indications persist.  This report was finalized on 2/9/2023 1:06 PM by Dr. Magno Herrera M.D.      Doppler Arterial Single Level Upper Extremity - Bilateral CAR    Result Date: 2/9/2023  •  Normal arterial pressures on the right. Normal digital pressures noted on the right. •  Normal arterial pressures on the left. Normal digital pressures noted on the left.       Medical Decision Making:  ED Course as of 02/09/23 1849   Thu Feb 09, 2023   1011 Discussed with Dr. Marie with stroke neurology, he will evaluate the patient and give further recommendations.  [JG]   1020 Dr. Marie agrees with current ED workup and recommends admission to observation for MRI and further workup.  [JG]   1134 Per vascular tech, WNL on RUE duplex.  [JG]   1144 HS Troponin T(!): 11 [JG]   1204 Spoke with MORA Ramos who accepts patient to obs unit for further work up.  [JG]   1205 Discussed ED workup and findings with patient and plan for admission for MRI and further work up. All questions and concerns addressed.  [JG]   1228 EKG          EKG time: 1216  Rhythm/Rate: Normal sinus, rate 59  P waves and CO: Normal P, normal CO  QRS, axis: Left bundle branch block  ST and T waves: No acute    Interpreted Contemporaneously by me, independently viewed  Not significantly changed compared to prior 3/2/2022   [TR]      ED Course User Index  [JG] Genia Bush APRN  [TR] Christopher Rodríguez MD       Plan to speak with neurology.  She has intact  pulses of her right upper extremity.  We will check chemistries and blood counts.  Her symptoms seem to be as a peripheral neuropathy.  I do not suspect acute stroke.    Procedures:  Procedures      PPE: The patient wore a mask and I wore an N95 mask throughout the entire patient encounter.      The patient has started, but not completed, their COVID-19 vaccination series.    Diagnosis  Final diagnoses:   Stroke-like symptoms   Right wrist drop   Paresthesia and pain of right extremity       Note Disclaimer: At [facility], we believe that sharing information builds trust and better relationships. You are receiving this note because you recently visited [facility]. It is possible you will see health information before a provider has talked with you about it. This kind of information can be easy to misunderstand. To help you fully understand what it means for your health, we urge you to discuss this note with your provider.     Christopher Rodrgíuez MD  02/09/23 [id number]

## 2023-02-09 NOTE — CONSULTS
Neurology Consult Note    Consult Date: 2/9/2023    Referring MD: Dr. Angel    Reason for Consult I have been asked to see the patient in neurological consultation to render advice and opinion regarding right hand weakness    Olivia Addison is a 77 y.o. female with reported history of A-fib not on anticoagulation, CHF, CKD, AMADO noncompliant with BiPAP, no prior history of stroke.  She woke up this morning with a weak and clumsy right hand.  She describes a wrist drop and numbness in the distal aspect of the arm past the elbow.  Symptoms have not improved since onset.  No associated facial droop or slurred speech or ataxia.    Past Medical History:   Diagnosis Date   • ADHD (attention deficit hyperactivity disorder)    • Adjustment disorder with anxiety    • Allergic rhinitis    • Anxiety and depression    • Arthritis    • Atherosclerosis    • Atrial fibrillation (HCC)     HX   • Atrophic vaginitis    • Blepharitis of both eyes    • CAD (coronary artery disease)    • Cardiomyopathy (Formerly [facility])     NON ISCHEMIC    • Cataract    • CHF (congestive heart failure) (Formerly [facility])     CHRONIC SYSTOLIC   • CKD (chronic kidney disease) stage 3, GFR 30-59 ml/min (Formerly [facility])    • DDD (degenerative disc disease), cervical    • Diaphoresis 05/23/2019    SEEN AT [facility]   • Diverticulitis 09/02/2019    SEEN AT [facility] ER   • Diverticulitis 11/15/2016   • Diverticulitis 09/07/2014   • Diverticulitis    • Diverticulitis large intestine 05/23/2017   • Diverticulitis of colon 08/22/2019    ADMITTED TO [facility]   • Dry eye syndrome    • Dyspnea    • Dysuria 06/2019   • Esophageal injury     CHAD-RAMOS SYNDROME   • Fatigue    • Heart failure (HCC)    • Hematuria 08/2019   • History of frequent urinary tract infections    • History of mitral valve repair    • History of sepsis     FROM UTI   • Hypercholesterolemia    • Hyperlipidemia    • Hypertension    • Kidney cysts 07/2017    FOLLOWED BY DR. 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 HARMAN CÁRDENAS   • Segmental and somatic dysfunction of cervical region    • Segmental and somatic dysfunction of thoracic region    • SOB (shortness of breath) on exertion     R/T HEART   • Vitamin D deficiency    • VT (ventricular tachycardia)        Exam  /90   Pulse 78   Temp 97.6 °F (36.4 °C)   Resp 16   Ht 157.5 cm (62\")   Wt 78 kg (172 lb)   SpO2 95%   BMI 31.46 kg/m²   Gen: NAD, vitals reviewed  MS: oriented x3, recent/remote memory intact, normal attention/concentration, language intact, no neglect.  CN: visual acuity grossly normal, PERRL, EOMI, no facial droop, no dysarthria  Motor: 4+/5 right shoulder abduction, arm extension and flexion, 3/5 right wrist extension, 4/5 other hand movements, 5/5 left upper and lower extremity, 5/5 right lower extremity  Sensory: Mildly diminished to cold temperature distal right right upper extremity in multiple nerve and root distributions    DATA:    Lab Results   Component Value Date    GLUCOSE 111 (H) 02/02/2023    CALCIUM 9.6 02/02/2023     (L) 02/02/2023    K 4.8 02/02/2023    CO2 23.0 02/02/2023    CL 99 02/02/2023    BUN 19 02/02/2023    CREATININE 1.07 (H) 02/02/2023    EGFRIFNONA 63 10/23/2020    BCR 17.8 02/02/2023    ANIONGAP 10.0 02/02/2023     Lab Results   Component Value Date    WBC 5.57 02/09/2023    HGB 13.0 02/09/2023    HCT 39.4 02/09/2023    MCV 96.6 02/09/2023     02/09/2023       Lab review: CBC normal    Imaging review: " MRI brain ordered    Diagnoses:  Right hand weakness  Paroxysmal atrial fibrillation  Obstructive sleep apnea    Pre-stroke MRS: 0    Comment: Acute right clumsy hand.  Exam is overall most suggestive of radial neuropathy, however there there are some findings on the sensory exam that are more difficult to explain and she does have A-fib not currently on anticoagulation.    PLAN:  Would recommend MRI of the brain to exclude acute stroke  Likely will need outpatient EMG of the right upper extremity    Management discussed with Dr. Angel    Addendum: Unable to get MRI due to pacemaker compatibility.  We will check a CTA of the head and neck.  If negative patient can be discharged and we will arrange for outpatient EMG of the right upper extremity

## 2023-02-09 NOTE — ED PROVIDER NOTES
EMERGENCY DEPARTMENT ENCOUNTER    Room Number:  24/24  Date seen:  2/9/2023  PCP: Dnauta Lynne MD  Historian: self, neighbor      HPI:  Chief Complaint: right arm weakness  A complete HPI/ROS/PMH/PSH/SH/FH are unobtainable due to: n/a  Context: Olivia Addison is a 77 y.o. female who presents to the ED c/o right arm weakness that she noticed when she woke up this morning.  Patient states that she went to bed between 11 and 12 last night and when she woke up she was having weakness, numbness, and tingling to her right forearm and wrist.  She denies any headache, changes in her vision, dizziness, confusion, difficulty with speech or ambulation, chest pain, shortness of breath, abdominal pain, urinary complaints.  She denies any neck pain, injury, trauma, falls.  She states that she woke up laying on her back with her arm across her chest this morning.  Additionally, she states that she has been having chronic diarrhea and is currently on Cipro for a diarrheal illness and is scheduled to follow-up with GI soon.  She is not on any anticoagulation.  She does have a pacemaker and has been told that she has A-fib in the past.        PAST MEDICAL HISTORY  Active Ambulatory Problems     Diagnosis Date Noted   • H/O tricuspid valve repair 10/14/2016   • Abdominal pain 10/18/2017   • Abdominal pain, left lower quadrant 10/08/2014   • Acute conjunctivitis 06/13/2016   • Adjustment disorder with anxious mood 10/18/2017   • Alcohol abuse 10/18/2017   • Anxiety 10/18/2017   • Attention deficit disorder 10/18/2017   • Biventricular implantable cardioverter-defibrillator in situ 06/18/2013   • Cardiomyopathy, nonischemic (HCC) 06/18/2013   • Diverticulitis of colon 10/18/2017   • Diverticulosis of intestine 10/18/2017   • Flank pain 10/18/2017   • 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   • Hematuria 10/18/2017   • Hypercholesterolemia 10/18/2017   • Low back pain 10/18/2017   • AMADO (obstructive sleep apnea) 09/29/2016    • Posttraumatic stress disorder 10/18/2017   • PTSD (post-traumatic stress disorder) 11/19/2014   • Urinary tract infection 10/18/2017   • S/P MVR (mitral valve repair) 10/18/2017   • Diverticulitis 08/23/2019   • Class 1 obesity in adult 08/23/2019   • LBBB (left bundle branch block)    • CKD (chronic kidney disease) stage 3, GFR 30-59 ml/min (Formerly [facility])      Resolved Ambulatory Problems     Diagnosis Date Noted   • Atrial fibrillation (Formerly [facility]) 06/18/2013   • Valvular disease 11/08/2017   • HTN (hypertension) 02/16/2018   • CHF (congestive heart failure) (Formerly [facility]) 02/16/2018     Past Medical History:   Diagnosis Date   • ADHD (attention deficit hyperactivity disorder)    • Adjustment disorder with anxiety    • Allergic rhinitis    • Anxiety and depression    • Arthritis    • Atherosclerosis    • Atrophic vaginitis    • Blepharitis of both eyes    • CAD (coronary artery disease)    • Cardiomyopathy (Formerly [facility])    • Cataract    • DDD (degenerative disc disease), cervical    • Diaphoresis 05/23/2019   • Diverticulitis large intestine 05/23/2017   • Dry eye syndrome    • Dyspnea    • Dysuria 06/2019   • Esophageal injury    • Fatigue    • Heart failure (Former[facility])    • History of frequent urinary tract infections    • History of mitral valve repair    • History of sepsis    • Hyperlipidemia    • Hypertension    • Kidney cysts 07/2017   • Estrellita-Biswas syndrome    • Memory loss    • NSVT (nonsustained ventricular tachycardia)    • Osteoporosis    • Patent foramen ovale    • Pneumonia 05/25/2017   • Presbyopia    • Presence of cardiac defibrillator    • Reactive airway disease    • Regular astigmatism    • RLS (restless legs syndrome)    • S/P TVR (tricuspid valve repair)    • Seborrheic keratosis    • Segmental and somatic dysfunction of cervical region    • Segmental and somatic dysfunction of thoracic region    • SOB (shortness of breath) on exertion    • Vitamin D deficiency    • VT (ventricular tachycardia)          PAST SURGICAL  HISTORY  Past Surgical History:   Procedure Laterality Date   • APPENDECTOMY N/A    • BIVENTRICULLAR IMPLANTABLE CARDIOVERTER DEFIBRILLATOR PLACEMENT N/A 04/10/2012    [employer], BI-V, DR. MICKEY MORALES AT [facility]   • BLADDER SUSPENSION N/A    • BLEPHAROPLASTY Bilateral 3/3/2022    Procedure: BILATERAL UPPER LID BLEPHAROPLASTY;  Surgeon: Edu Power MD;  Location:  [facility];  Service: Ophthalmology;  Laterality: Bilateral;   • BLEPHAROPLASTY Bilateral 3/3/2022    Procedure: BILATERAL LOWER LID BLEPHAROPLASTY;  Surgeon: Edu Power MD;  Location:  [facility];  Service: New Image;  Laterality: Bilateral;   • BREAST CYST ASPIRATION     • BREAST SURGERY Bilateral     REDUCTION MAMMAPLASTY   • BROW LIFT Bilateral 3/3/2022    Procedure: BILATERAL LATERAL BROWPEXY;  Surgeon: Edu Power MD;  Location:  [facility];  Service: Ophthalmology;  Laterality: Bilateral;   • CARDIAC CATHETERIZATION Bilateral 03/19/2012    Normal coronary arteries, severe mitral regurgitation and aortic regurgitation. EF was about 30-35%.; DR. JYOTI ROMERO AT [facility]   • CARDIAC ELECTROPHYSIOLOGY PROCEDURE Left 7/11/2016    Procedure: PPM battery change [employer];  Surgeon: Hunter Faust MD;  Location: [facility] CATH INVASIVE LOCATION;  Service:    • CATARACT EXTRACTION Bilateral    • COLONOSCOPY N/A 10/14/2014    PANDIVERTICULOSIS IN ENTIRE COLON, DR. BRANDEN RUIZ AT [facility]   • COLONOSCOPY N/A 11/13/2019    SCATTERED SMALL AND LARGE DIVERTICULA IN ENTIRE COLON, RESCOPE IN 10 YRS, DR. PERCY MUHAMMAD AT [facility]   • ENDOSCOPY N/A 06/06/2012    CHAD-RAMOS TEAR WITH ACTIVE BLEEDINGBLOOD THROUGHOUT STOMACH, BLOOD IN DUODENUM, DR. TARUN GILLIS AT [facility]   • ESOPHAGUS SURGERY      TEAR DURING HEART SURGERY, AT BOTTOM OF ESOPHAGUS   • HYSTERECTOMY N/A 1980    OLIVIA WITH BSO   • IMPLANTABLE CARDIOVERTER DEFIBRILLATOR LEAD REPLACEMENT/POCKET REVISION N/A 08/28/2012    DR. GISELLE BOOGIE AT [facility]   • MITRAL VALVE  REPAIR/REPLACEMENT N/A 06/06/2012    REMODELING  ANULOPLASTY USING 24 MM BARAJAS ANULOPLASTY RING, MODEL 5200, SERIAL # [id number], DR. VINCE ZAMUDIO AT [facility]   • PATENT FORAMEN OVALE CLOSURE N/A 06/06/2012    DR. VINEC ZAMUDIO AT [facility]   • TONSILLECTOMY Bilateral    • TRICUSPID VALVE SURGERY N/A 06/06/2012    REMODELING ANULOPLASTY USING 26 MM BARAJAS ANULOPLASTY RING, MODEL 6200, SERIAL # [id number], DR. VINCE ZAMUDIO AT [facility]   • VAGINAL DELIVERY N/A 1974         FAMILY HISTORY  Family History   Problem Relation Age of Onset   • Diabetes Mother    • Hypertension Mother    • Emphysema Father    • Anxiety disorder Father    • Depression Father    • Hypertension Father    • Stroke Sister    • No Known Problems Brother    • Heart attack Maternal Grandmother    • Sleep apnea Maternal Grandmother    • No Known Problems Maternal Grandfather    • No Known Problems Paternal Grandmother    • No Known Problems Paternal Grandfather    • No Known Problems Brother    • No Known Problems Daughter    • Malig Hyperthermia Neg Hx          SOCIAL HISTORY  Social History     Socioeconomic History   • Marital status:    Tobacco Use   • Smoking status: Never   • Smokeless tobacco: Never   Vaping Use   • Vaping Use: Never used   Substance and Sexual Activity   • Alcohol use: Yes     Comment: WINE OCCASIONAL   • Drug use: No   • Sexual activity: Defer     Birth control/protection: Post-menopausal, Surgical         ALLERGIES  Sulfa antibiotics        REVIEW OF SYSTEMS  Per HPI, otherwise negative.       PHYSICAL EXAM  ED Triage Vitals   Temp Heart Rate Resp BP SpO2   -- 02/09/23 0950 02/09/23 0953 -- 02/09/23 0950    78 16  95 %      Temp src Heart Rate Source Patient Position BP Location FiO2 (%)   -- -- -- -- --              Physical Exam  Vitals and nursing note reviewed.   Constitutional:       Appearance: Normal appearance.   HENT:      Head: Normocephalic and atraumatic.   Cardiovascular:      Rate and Rhythm: Normal rate and  regular rhythm.      Pulses: Normal pulses.      Heart sounds: Normal heart sounds.   Pulmonary:      Effort: Pulmonary effort is normal.      Breath sounds: Normal breath sounds.   Abdominal:      General: Bowel sounds are normal.      Palpations: Abdomen is soft.      Tenderness: There is no abdominal tenderness. There is no guarding.   Musculoskeletal:         General: Normal range of motion.      Right wrist: Normal pulse.      Left wrist: Normal pulse.      Comments: Right wrist weakness and decreased sensation to right forearm and most consistent with radial nerve palsy.    Skin:     General: Skin is warm.      Capillary Refill: Capillary refill takes less than 2 seconds.   Neurological:      Mental Status: She is alert and oriented to person, place, and time. Mental status is at baseline.      Comments: Interval: baseline  1a. Level of Consciousness: 0-->Alert, keenly responsive  1b. LOC Questions: 0-->Answers both questions correctly  1c. LOC Commands: 0-->Performs both tasks correctly  2. Best Gaze: 0-->Normal  3. Visual: 0-->No visual loss  4. Facial Palsy: 1-->Minor paralysis (flattened nasolabial fold, asymmetry on smiling)  5a. Motor Arm, Left: 0-->No drift, limb holds 90 (or 45) degrees for full 10 secs  5b. Motor Arm, Right: 0-->No drift, limb holds 90 (or 45) degrees for full 10 secs  6a. Motor Leg, Left: 0-->No drift, leg holds 30 degree position for full 5 secs  6b. Motor Leg, Right: 0-->No drift, leg holds 30 degree position for full 5 secs   7. Limb Ataxia: 0-->Absent  8. Sensory: 1-->Mild-to-moderate sensory loss, patient feels pinprick is less sharp or is dull on the affected side, or there is a loss of superficial pain with pinprick, but patient is aware of being touched  9. Best Language: 0-->No aphasia, normal  10. Dysarthria: 0-->Normal  11. Extinction and Inattention (formerly Neglect): 0-->No abnormality    Total (NIH Stroke Scale): 2    Psychiatric:         Mood and Affect: Mood normal.                LAB RESULTS  Recent Results (from the past 24 hour(s))   Comprehensive Metabolic Panel    Collection Time: 02/09/23 10:08 AM    Specimen: Blood   Result Value Ref Range    Glucose 115 (H) 65 - 99 mg/dL    BUN 27 (H) 8 - 23 mg/dL    Creatinine 0.85 0.57 - 1.00 mg/dL    Sodium 137 136 - 145 mmol/L    Potassium 4.6 3.5 - 5.2 mmol/L    Chloride 105 98 - 107 mmol/L    CO2 25.3 22.0 - 29.0 mmol/L    Calcium 9.3 8.6 - 10.5 mg/dL    Total Protein 7.2 6.0 - 8.5 g/dL    Albumin 4.6 3.5 - 5.2 g/dL    ALT (SGPT) 18 1 - 33 U/L    AST (SGOT) 14 1 - 32 U/L    Alkaline Phosphatase 71 39 - 117 U/L    Total Bilirubin 0.4 0.0 - 1.2 mg/dL    Globulin 2.6 gm/dL    A/G Ratio 1.8 g/dL    BUN/Creatinine Ratio 31.8 (H) 7.0 - 25.0    Anion Gap 6.7 5.0 - 15.0 mmol/L    eGFR 71.1 >60.0 mL/min/1.73   Protime-INR    Collection Time: 02/09/23 10:08 AM    Specimen: Blood   Result Value Ref Range    Protime 12.7 11.7 - 14.2 Seconds    INR 0.94 0.90 - 1.10   aPTT    Collection Time: 02/09/23 10:08 AM    Specimen: Blood   Result Value Ref Range    PTT 25.6 22.7 - 35.4 seconds   High Sensitivity Troponin T    Collection Time: 02/09/23 10:08 AM    Specimen: Blood   Result Value Ref Range    HS Troponin T 11 (H) <10 ng/L   Type & Screen    Collection Time: 02/09/23 10:08 AM    Specimen: Blood   Result Value Ref Range    ABO Type A     RH type Positive     Antibody Screen Negative     T&S Expiration Date 2/12/2023 11:59:59 PM    Green Top (Gel)    Collection Time: 02/09/23 10:08 AM   Result Value Ref Range    Extra Tube Hold for add-ons.    Lavender Top    Collection Time: 02/09/23 10:08 AM   Result Value Ref Range    Extra Tube hold for add-on    Gold Top - SST    Collection Time: 02/09/23 10:08 AM   Result Value Ref Range    Extra Tube Hold for add-ons.    Light Blue Top    Collection Time: 02/09/23 10:08 AM   Result Value Ref Range    Extra Tube Hold for add-ons.    CBC Auto Differential    Collection Time: 02/09/23 10:08 AM    Specimen:  Blood   Result Value Ref Range    WBC 5.57 3.40 - 10.80 10*3/mm3    RBC 4.08 3.77 - 5.28 10*6/mm3    Hemoglobin 13.0 12.0 - 15.9 g/dL    Hematocrit 39.4 34.0 - 46.6 %    MCV 96.6 79.0 - 97.0 fL    MCH 31.9 26.6 - 33.0 pg    MCHC 33.0 31.5 - 35.7 g/dL    RDW 12.1 (L) 12.3 - 15.4 %    RDW-SD 42.7 37.0 - 54.0 fl    MPV 9.0 6.0 - 12.0 fL    Platelets 236 140 - 450 10*3/mm3    Neutrophil % 63.3 42.7 - 76.0 %    Lymphocyte % 22.3 19.6 - 45.3 %    Monocyte % 10.4 5.0 - 12.0 %    Eosinophil % 3.1 0.3 - 6.2 %    Basophil % 0.5 0.0 - 1.5 %    Immature Grans % 0.4 0.0 - 0.5 %    Neutrophils, Absolute 3.53 1.70 - 7.00 10*3/mm3    Lymphocytes, Absolute 1.24 0.70 - 3.10 10*3/mm3    Monocytes, Absolute 0.58 0.10 - 0.90 10*3/mm3    Eosinophils, Absolute 0.17 0.00 - 0.40 10*3/mm3    Basophils, Absolute 0.03 0.00 - 0.20 10*3/mm3    Immature Grans, Absolute 0.02 0.00 - 0.05 10*3/mm3    nRBC 0.0 0.0 - 0.2 /100 WBC   Magnesium    Collection Time: 02/09/23 10:08 AM    Specimen: Blood   Result Value Ref Range    Magnesium 2.2 1.6 - 2.4 mg/dL   Doppler Arterial Single Level Upper Extremity - Bilateral CAR    Collection Time: 02/09/23 11:33 AM   Result Value Ref Range    Target HR (85%) 122 bpm    Max. Pred. HR (100%) 144 bpm    Upper arterial right arm brachial sys max 124 mmHg    Upper arterial left arm brachial sys max 120 mmHg    Upper arterial right arm radial sys max 146 mmHg    Upper arterial left arm radial sys max 139 mmHg    Upper arterial right arm ulnar sys max 144 mmHg    Upper arterial left arm ulnar sys max 132 mmHg    RIGHT 2ND DIGIT SYS      LEFT 2ND DIGIT SYS      RIGHT WBI RATIO 1.18     LEFT WBI RATIO 1.12     RIGHT FBI 2ND DIGIT RATIO 1.02     LEFT FBI 2ND DIGIT RATIO 1.02        Ordered the above labs and reviewed the results.        RADIOLOGY  Doppler Arterial Single Level Upper Extremity - Bilateral CAR    Result Date: 2/9/2023  •  Normal arterial pressures on the right. Normal digital pressures noted  on the right. •  Normal arterial pressures on the left. Normal digital pressures noted on the left.       Ordered the above noted radiological studies. Reviewed by me in PACS.            MEDICATIONS GIVEN IN ER  Medications   sodium chloride 0.9 % flush 10 mL (has no administration in time range)             MEDICAL DECISION MAKING, PROGRESS, and CONSULTS    All labs have been independently reviewed by me.  All radiology studies have been reviewed by me and I have also reviewed the radiology report.   EKG's independently viewed and interpreted by me.  Discussion below represents my analysis of pertinent findings related to patient's condition, differential diagnosis, treatment plan and final disposition.    Presents with right wrist/forearm weakness, numbness, and tingling upon waking up last night.   Last known well between 11pm and 12am prior to going to bed- patient not a TPA candidate.   Stroke neurology consulted and recommendations placed  Labs, imaging unremarkable  Admitted for MRI and further work up    Additional sources:  - Discussed/ obtained information from independent historians:  n/a    - External (non-ED) record review:   1/25/2023: Office visit for chest pain, diarrhea: Started on Cipro and referred to GI    - Chronic or social conditions impacting care: age    - Shared decision making:  n/a      Orders placed during this visit:  Orders Placed This Encounter   Procedures   • XR Chest 1 View   • Bend Draw   • Comprehensive Metabolic Panel   • Protime-INR   • aPTT   • Troponin   • CBC Auto Differential   • Magnesium   • High Sensitivity Troponin T 2Hr   • NPO Diet NPO Type: Strict NPO   • Perform NIH Stroke Scale   • Measure Actual Weight   • Notify MD for SBP < 80 or > 200   • Notify Provider for SBP greater than 140 if hemorrhagic stroke   • Head of bed 30 Degrees or Less   • Undress and Gown   • Cardiac Monitoring   • Continuous Pulse Oximetry   • Vital Signs   • Neuro Checks   • No Hypotonic  Fluids   • Nursing Swallow Assessment   • Obtain & Apply The Following- Upper extremity; Wrist cock-up   • Oxygen Therapy- Nasal Cannula; 2 LPM; Titrate for SPO2: equal to or greater than, 94%   • POC Glucose Once   • ECG 12 Lead Stroke Evaluation   • Type & Screen   • Insert Large-Bore Peripheral IV - RIGHT AC Preferred   • CBC & Differential   • Green Top (Gel)   • Lavender Top   • Gold Top - SST   • Light Blue Top         Additional orders considered but not ordered:  MRI- deferred to admission        Differential diagnosis include, but not limited to:    CVA, radial nerve palsy, wrist sprain, electrolyte imbalance      Independent interpretation of labs, radiology studies, and discussions with consultants:  ED Course as of 02/09/23 1231   Thu Feb 09, 2023   1011 Discussed with Dr. Marie with stroke neurology, he will evaluate the patient and give further recommendations.  [JG]   1020 Dr. Marie agrees with current ED workup and recommends admission to observation for MRI and further workup.  [JG]   1134 Per vascular tech, WNL on RUE duplex.  [JG]   1144 HS Troponin T(!): 11 [JG]   1204 Spoke with MORA Ramos who accepts patient to obs unit for further work up.  [JG]   1205 Discussed ED workup and findings with patient and plan for admission for MRI and further work up. All questions and concerns addressed.  [JG]   1228 EKG          EKG time: 1216  Rhythm/Rate: Normal sinus, rate 59  P waves and KS: Normal P, normal KS  QRS, axis: Left bundle branch block  ST and T waves: No acute    Interpreted Contemporaneously by me, independently viewed  Not significantly changed compared to prior 3/2/2022   [TR]      ED Course User Index  [JG] Genia Bush APRN  [TR] Christopher Rodríguez MD             DIAGNOSIS  Final diagnoses:   Stroke-like symptoms   Right wrist drop   Paresthesia and pain of right extremity         DISPOSITION  admit        Latest Documented Vital Signs:  As of 12:05 EST  BP- 155/90 HR- 78 Temp-  97.6 °F (36.4 °C) O2 sat- 95%              --    Please note that portions of this were completed with a voice recognition program.       Note Disclaimer: At [facility], we believe that sharing information builds trust and better relationships. You are receiving this note because you are receiving care at [facility] or recently visited. It is possible you will see health information before a provider has talked with you about it. This kind of information can be easy to misunderstand. To help you fully understand what it means for your health, we urge you to discuss this note with your provider.           Genia Bush, APRN  02/09/23 [id number]

## 2023-02-09 NOTE — PROGRESS NOTES
MD ATTESTATION NOTE    The MORA and I have discussed this patient's history, physical exam, and treatment plan.  I have reviewed the documentation and personally had a face to face interaction with the patient. I affirm the documentation and agree with the treatment and plan.  The attached note describes my personal findings.      I provided a substantive portion of the care of the patient.  I personally performed the physical exam in its entirety, and below are my findings.  For this patient encounter, the patient wore surgical mask, I wore full protective PPE including N95 and eye protection    Brief HPI: This is a pleasant 77-year-old female with a history of atrial fibrillation, current pacemaker, CHF, chronic kidney disease, obstructive sleep apnea, and noncompliance with BiPAP.  She presented because she woke up this morning and had problems using her right hand and right arm.  When she went to bed last night she felt fine.  Denies any other symptoms.  Denies any visual change, speech change, weakness in legs.  She also has an altered sensation in the right forearm from about the elbow down to her fingers.  Denies any pain.  She was seen by neurology in the emergency department and was referred to the observation unit for further work-up.    General : Very pleasant elderly female.  Patient is in no acute distress.  Patient is awake alert and oriented  HEENT: NCAT  Eye exam: Pupils equal round reactive to light extract muscles are intact.  No visual impairment  CV: Heart is regular with systolic murmur 2 out of 6.  Respiratory: CTA bilaterally.  No respiratory distress  Abd: Soft and nontender.  Obese  Ext: No edema.  Intact distal pulses to upper and lower extremities are equal strong and symmetric.  There is no coolness or cyanosis to distal extremities.  Patient has a right wrist drop in visual expection.  Skin: No rash  Neuro: Cranial nerves II through XII grossly intact as tested.  Patient has no drift to  upper extremities bilaterally.  She does have a right wrist drop.  She does have some weakness in grasp as well as abduction and abduction of fingers.  She has an altered sensation from about the elbow down to her fingers.  She has no weakness to lower extremities.  No other sensory change to any other extremities other than what is mentioned above.  Psych: Normal mood and affect    I have reviewed the patient's vital signs, lab work, EKG and imaging.    I reviewed EKG that was done at 1216  It is rate of 59 it is normal sinus rhythm with appearance of a left bundle branch block  At appreciating obvious acute injury pattern.  I do not appreciate the patient is in atrial fibrillation at this moment.  I compared to an EKG that was done on March 2, 2022 and it looks very similar.  She was in normal sinus rhythm at that time as well.    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  She went to bed and was fine.  Very well could have slept or put pressure on that radial nerve.  She denies any pain.  She has no other neurologic symptoms.  She was seen by Dr. Marie from neurology in the emergency department.  She was referred to the observation unit for further work-up.  She does have a pacemaker but have just been informed from MRI that her pacemaker is not compatible with an MRI.  Dr. Marie has been informed.  We will check a CT angiogram of the head and neck.  All questions answered.    Note Disclaimer: At [facility], we believe that sharing information builds trust and better relationships. You are receiving this note because you recently visited [facility]. It is possible you will see health information before a provider has talked with you about it. This kind of information can be easy to misunderstand. To help you fully understand what it means for your health, we urge you to discuss this note with your provider.

## 2023-02-09 NOTE — PROGRESS NOTES
Clinical Pharmacy Services: Medication History    Olivia Addison is a 77 y.o. female presenting to [facility] for   Chief Complaint   Patient presents with   • Numbness     Right hand numbness        She  has a past medical history of ADHD (attention deficit hyperactivity disorder), Adjustment disorder with anxiety, Allergic rhinitis, Anxiety and depression, Arthritis, Atherosclerosis, Atrial fibrillation ([facility]), Atrophic vaginitis, Blepharitis of both eyes, CAD (coronary artery disease), Cardiomyopathy ([facility]), Cataract, CHF (congestive heart failure) ([facility]), CKD (chronic kidney disease) stage 3, GFR 30-59 ml/min ([facility]), DDD (degenerative disc disease), cervical, Diaphoresis (05/23/2019), Diverticulitis (09/02/2019), Diverticulitis (11/15/2016), Diverticulitis (09/07/2014), Diverticulitis, Diverticulitis large intestine (05/23/2017), Diverticulitis of colon (08/22/2019), Dry eye syndrome, Dyspnea, Dysuria (06/2019), Esophageal injury, Fatigue, Heart failure ([facility]), Hematuria (08/2019), History of frequent urinary tract infections, History of mitral valve repair, History of sepsis, Hypercholesterolemia, Hyperlipidemia, Hypertension, Kidney cysts (07/2017), LBBB (left bundle branch block), Estrellita-Biswas syndrome, Memory loss, NSVT (nonsustained ventricular tachycardia), AMADO (obstructive sleep apnea), Osteoporosis, Patent foramen ovale, Pneumonia (05/25/2017), Presbyopia, Presence of cardiac defibrillator, PTSD (post-traumatic stress disorder), Reactive airway disease, Regular astigmatism, RLS (restless legs syndrome), S/P TVR (tricuspid valve repair), Seborrheic keratosis, Segmental and somatic dysfunction of cervical region, Segmental and somatic dysfunction of thoracic region, SOB (shortness of breath) on exertion, Vitamin D deficiency, and VT (ventricular tachycardia).    Allergies as of 02/09/2023 - Reviewed 02/09/2023   Allergen Reaction Noted   • Sulfa antibiotics Itching and Rash 06/10/2016        Medication information was obtained from: Patient   Pharmacy and Phone Number:     Prior to Admission Medications     Prescriptions Last Dose Informant Patient Reported? Taking?    acyclovir (ZOVIRAX) 400 MG tablet 2/8/2023 Self Yes Yes    Take 400 mg by mouth 2 (two) times a day. Take no more than 5 doses a day.    atorvastatin (LIPITOR) 40 MG tablet 2/8/2023 Self Yes Yes    Take 40 mg by mouth daily.    buPROPion XL (WELLBUTRIN XL) 300 MG 24 hr tablet 2/8/2023 Self Yes Yes    Take 300 mg by mouth Daily.    carvedilol (COREG) 25 MG tablet 2/8/2023 Self Yes Yes    Take 25 mg by mouth 2 (two) times a day.    ciprofloxacin (Cipro) 500 MG tablet 2/8/2023  No Yes    Take 1 tablet by mouth 2 (Two) Times a Day.    Patient taking differently:  Take 500 mg by mouth 2 (Two) Times a Day. Ends 02/09/23    escitalopram (LEXAPRO) 10 MG tablet 2/8/2023 Self Yes Yes    Take 10 mg by mouth Daily.    furosemide (LASIX) 40 MG tablet 2/8/2023 Self No Yes    TAKE ONE TABLET BY MOUTH DAILY    Patient taking differently:  Take 20-40 mg by mouth Daily. 20 MG DAILY, UNLESS GAINS 2-3 POUNDS OVERNIGHT OR 5 POUNDS WEEKLY TO TAKE 40 MG    sacubitril-valsartan (Entresto) 24-26 MG tablet 2/8/2023 Self No Yes    Take 1 tablet by mouth 2 (Two) Times a Day.    sertraline (ZOLOFT) 100 MG tablet 2/8/2023 Self Yes Yes    Take 100 mg by mouth Daily.    spironolactone (ALDACTONE) 25 MG tablet 2/8/2023 Self No Yes    TAKE 1 TABLET BY MOUTH EVERY DAY    Patient taking differently:  Take 25 mg by mouth Daily.            Medication notes:     This medication list is complete to the best of my knowledge as of 2/9/2023    Please call if questions.    Christiano Smith  Medication History Technician  [phone]    2/9/2023 12:39 EST

## 2023-02-10 ENCOUNTER — TELEPHONE (OUTPATIENT)
Dept: NEUROLOGY | Facility: CLINIC | Age: 78
End: 2023-02-10
Payer: MEDICARE

## 2023-02-10 ENCOUNTER — READMISSION MANAGEMENT (OUTPATIENT)
Dept: CALL CENTER | Facility: HOSPITAL | Age: 78
End: 2023-02-10
Payer: MEDICARE

## 2023-02-10 DIAGNOSIS — R29.898 WEAKNESS OF RIGHT UPPER EXTREMITY: Primary | ICD-10-CM

## 2023-02-10 NOTE — DISCHARGE SUMMARY
ED OBSERVATION PROGRESS/DISCHARGE SUMMARY    Date of Admission: 2/9/2023   LOS: 0 days   PCP: Danuta Lynne MD      Subjective    No acute events overnight.   Patient still having weakness of the right wrist as well as some paresthesias but states that those have improved.  She denies any headache or visual changes.  Denies abdominal pain and nausea.  Denies fevers and chills.    Hospital Outcome:  77-year-old female admitted to the observation unit for further evaluation of right wrist drop with paresthesias.  In the ER, chest x-ray revealed no acute findings.  Bilateral arterial Dopplers were normal.  Neurology was consulted recommending further evaluation with neuroimaging.  MRI was not able to be obtained as pacemaker was not compatible.  Spoke with Dr. Marie with neurology and proceeded with CTA of the head and neck that shows no acute infarct or intracranial hemorrhage, there is some normal anatomic variations noted with no significant stenosis.  Neurology will set up for patient to have an outpatient EMG.  I have placed referral for outpatient physical therapy.  I have discussed all of the findings and plan with the patient who endorses understanding is in agreement.    ROS:  General: no fevers, chills  Respiratory: no cough, dyspnea  Cardiovascular: no chest pain, palpitations  Abdomen: No abdominal pain, nausea, vomiting, or diarrhea  Neurologic: No focal weakness    Objective   Physical Exam:  I have reviewed the vital signs.  Temp:  [97.6 °F (36.4 °C)-98.1 °F (36.7 °C)] 98.1 °F (36.7 °C)  Heart Rate:  [60-78] 67  Resp:  [16] 16  BP: (120-155)/(71-90) 120/82  General Appearance:    Alert, cooperative, no distress  Head:    Normocephalic, atraumatic  Eyes:    Sclerae anicteric  Neck:   Supple, no mass  Lungs: Clear to auscultation bilaterally, respirations unlabored  Heart: Regular rate and rhythm, S1 and S2 normal, no murmur, rub or gallop  Abdomen:  Soft, non-tender, bowel sounds active,  nondistended  Extremities: No clubbing, cyanosis, or edema to lower extremities  Pulses:  2+ and symmetric in distal lower extremities  Skin: No rashes   Neurologic: Oriented x3, right wrist drop, weak right hand     Results Review:    I have reviewed the labs, radiology results and diagnostic studies.    Results from last 7 days   Lab Units 02/09/23  1008   WBC 10*3/mm3 5.57   HEMOGLOBIN g/dL 13.0   HEMATOCRIT % 39.4   PLATELETS 10*3/mm3 236     Results from last 7 days   Lab Units 02/09/23  1008   SODIUM mmol/L 137   POTASSIUM mmol/L 4.6   CHLORIDE mmol/L 105   CO2 mmol/L 25.3   BUN mg/dL 27*   CREATININE mg/dL 0.85   CALCIUM mg/dL 9.3   BILIRUBIN mg/dL 0.4   ALK PHOS U/L 71   ALT (SGPT) U/L 18   AST (SGOT) U/L 14   GLUCOSE mg/dL 115*     Imaging Results (Last 24 Hours)     Procedure Component Value Units Date/Time    CT Angiogram Head [[id number]] Collected: 02/09/23 1852     Updated: 02/09/23 1853    Narrative:      CONTRAST-ENHANCED CT ANGIOGRAM OF THE HEAD AND NECK ON 02/09/2023     CLINICAL HISTORY: Right upper extremity weakness and numbness     TECHNIQUE: Spiral CT images were obtained from the base of the skull to  the vertex both pre and post intravenous contrast and these images were  reformatted and submitted in 3 mm thick axial, sagittal and coronal CT  sections with brain algorithm. Additional spiral CT angiogram images  were obtained from the top of the aortic arch up through the great  vessels of the head and neck during the arterial phase of contrast and  images were reformatted and submitted in 1 mm thick axial, sagittal and  coronal CT sections with soft tissue algorithm and 3-D reconstructions  were performed to complete the CT angiogram of the head.     COMPARISON: This is correlated to a noncontrast head CT 01/02/2020.     FINDINGS:     HEAD CT: There is some patchy low-density in the periventricular  extending to the subcortical white matter of the cerebral hemispheres  most pronounced in  the left frontal parietal periventricular and  subcortical white matter consistent with mild-to-moderate small vessel  disease. The remainder of the brain parenchyma is normal in attenuation.  The ventricles are normal in size. I see no focal mass effect. There is  no midline shift. No extra axial fluid collections are identified. There  is no evidence of acute intracranial hemorrhage. Following contrast no  abnormal areas of enhancement are seen in the head. The calvarium and  skull base are normal in appearance. There is focal deformity in medial  right orbital wall protrusion medial extraconal fat in the right ethmoid  sinus likely a congenital dehiscence and less likely an old  posttraumatic deformity.     CT ANGIOGRAM OF THE NECK: The nasopharynx, oropharynx, hypopharynx, true  cords and subglottic airway are normal in appearance. The thyroid gland  is unremarkable. The lung apices are clear. The parotid, ,  parapharyngeal, and submandibular spaces are symmetric and are normal in  appearance. There is mild-to-moderate right and moderate left facet  overgrowth at C2-3, C3-4 and C4-5 with mild left bony foraminal  narrowing at C3-4 and C4-5. Posterior spurs mildly narrow the canal at  C5-6 and C6-7 and uncovertebral joint spurs result in moderate right  bony foraminal narrowing at C5-6, there is moderate bilateral bony  foraminal narrowing at C6-7. 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 The right vertebral artery origin is normal in  appearance and the cervical segment of the right vertebral artery is  widely patent to just above the C2 transverse foramen where it takes an  abrupt bend and has a focal mild-to-moderate stenosis. No additional  stenosis is seen in the cervical segment of the right vertebral artery.  Calcified plaque mildly  narrows the left subclavian artery origin and  beam hardening artifact off densely opacified left brachycephalic vein  that partially obscures the left subclavian artery. The left vertebral  artery appears to take origin directly off the aortic arch which is also  a normal anatomic variation and unfortunately dense beam hardening  artifact off the densely opacified left brachycephalic vein streaks  through and obscures evaluation of the proximal left vertebral artery  and obscures evaluation. The cervical segment of the left vertebral  artery is widely patent without stenosis. The left common carotid origin  is normal in appearance and no stenosis seen in the left common carotid  artery, its bifurcation in the left internal and external carotid  arteries are normal in appearance. No stenosis seen in the cervical  segment of the left internal carotid artery using the NASCET criteria.  The right common carotid origin is normal in appearance. No stenosis  seen in the right common carotid artery, its bifurcation into the right  internal and external carotid arteries normal in appearance and no  stenosis seen in the cervical segment of the right internal carotid  artery using the NASCET criteria     CT ANGIOGRAM OF THE HEAD: The intracranial segment of the dominant left  vertebral artery is widely patent without stenosis to the  vertebrobasilar junction. The smaller diameter right vertebral artery  gives off the right posterior inferior cerebellar artery and the  post-PICA segment is extremely small in diameter but I believe it is  patent to the vertebrobasilar junction. The basilar artery and the  basilar tip is normal in appearance. There are markedly hypoplastic P1  segments of the posterior cerebral arteries with dominant bilateral  posterior communicating artery supplying the bulk of the blood flow to  the PCA territories and both posterior cerebral and superior cerebellar  arteries are normal in appearance. The  upper cervical, petrous,  cavernous and supracavernous segments of the internal carotid arteries  are within normal limits. The A1 segments of the anterior cerebral  arteries, the anterior communicating artery origin and the A2 segments  of the anterior cerebral arteries are normal in appearance. The M1  segments of the middle cerebral arteries and middle cerebral artery  bifurcations and M2 branch of the middle cerebral arteries are normal in  appearance.       Impression:      1. The noncontrast head CT is without change when compared to a prior  head CT on 01/02/2020, 3 years ago. There is mild-to-moderate small  vessel disease in the cerebral white matter. There is deformity of the  medial right orbital wall either from an old posttraumatic deformity or  a congenital dehiscence. The remainder of the head CT is within normal  limits with no acute infarct or intracranial hemorrhage seen. If there  remains any clinical suspicion of acute infarct causing right upper  extremity weakness an MRI of the brain suggested to further evaluate.   2. 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 I also believe there is direct  origin of the left vertebral artery off the aortic arch which is a  normal anatomic variation. Unfortunately dense beam hardening artifact  off densely opacified brachycephalic vein streaks through the proximal  left vertebral artery and left subclavian artery and obscures  evaluation. The cervical segment of te left vertebral artery  demonstrates mild up to mild-to-moderate stenosis where it takes an  abrupt bend and causes mild kinking of the vessel just above the left C2  transverse foramen, otherwise no stenosis is seen in the left vertebral  artery. The right vertebral artery is widely patent to the right PICA  origin, post-PICA segment is extremely tiny in diameter  but I believe it  is patent to the vertebrobasilar junction. The remainder of the CT  angiogram evaluation of the great vessels of the head and neck is  normal, specifically no stenosis is seen in the cervical segments of the  internal carotid arteries and no intracranial vascular stenoses or  occlusions are identified  3. Cervical spondylosis is present. There is disc space narrowing,  degenerative endplate changes, posterior spurring resulting in mild  canal narrowing at C5-6 and C6-7. There is multilevel foraminal  narrowing seen in the cervical spine with mild left bony foraminal  narrowing at C3-4 and C4-5. There is moderate right bony foraminal  narrowing at C5-6 and moderate bilateral bony foraminal narrowing at  C6-7. The remainder of the CT angiogram of the head and neck is normal.   Results were communicated to the [facility] by telephone  02/09/2023 at 5:30 PM Radiation dose reduction techniques were utilized,  including automated exposure control and exposure modulation based on  body size.          CT Angiogram Neck [[id number]] Collected: 02/09/23 1852     Updated: 02/09/23 1853    Narrative:      CONTRAST-ENHANCED CT ANGIOGRAM OF THE HEAD AND NECK ON 02/09/2023     CLINICAL HISTORY: Right upper extremity weakness and numbness     TECHNIQUE: Spiral CT images were obtained from the base of the skull to  the vertex both pre and post intravenous contrast and these images were  reformatted and submitted in 3 mm thick axial, sagittal and coronal CT  sections with brain algorithm. Additional spiral CT angiogram images  were obtained from the top of the aortic arch up through the great  vessels of the head and neck during the arterial phase of contrast and  images were reformatted and submitted in 1 mm thick axial, sagittal and  coronal CT sections with soft tissue algorithm and 3-D reconstructions  were performed to complete the CT angiogram of the head.     COMPARISON: This is correlated to a  noncontrast head CT 01/02/2020.     FINDINGS:     HEAD CT: There is some patchy low-density in the periventricular  extending to the subcortical white matter of the cerebral hemispheres  most pronounced in the left frontal parietal periventricular and  subcortical white matter consistent with mild-to-moderate small vessel  disease. The remainder of the brain parenchyma is normal in attenuation.  The ventricles are normal in size. I see no focal mass effect. There is  no midline shift. No extra axial fluid collections are identified. There  is no evidence of acute intracranial hemorrhage. Following contrast no  abnormal areas of enhancement are seen in the head. The calvarium and  skull base are normal in appearance. There is focal deformity in medial  right orbital wall protrusion medial extraconal fat in the right ethmoid  sinus likely a congenital dehiscence and less likely an old  posttraumatic deformity.     CT ANGIOGRAM OF THE NECK: The nasopharynx, oropharynx, hypopharynx, true  cords and subglottic airway are normal in appearance. The thyroid gland  is unremarkable. The lung apices are clear. The parotid, ,  parapharyngeal, and submandibular spaces are symmetric and are normal in  appearance. There is mild-to-moderate right and moderate left facet  overgrowth at C2-3, C3-4 and C4-5 with mild left bony foraminal  narrowing at C3-4 and C4-5. Posterior spurs mildly narrow the canal at  C5-6 and C6-7 and uncovertebral joint spurs result in moderate right  bony foraminal narrowing at C5-6, there is moderate bilateral bony  foraminal narrowing at C6-7. 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 The right vertebral artery origin is normal in  appearance and the cervical segment of the right vertebral artery is  widely patent to just above the C2  transverse foramen where it takes an  abrupt bend and has a focal mild-to-moderate stenosis. No additional  stenosis is seen in the cervical segment of the right vertebral artery.  Calcified plaque mildly narrows the left subclavian artery origin and  beam hardening artifact off densely opacified left brachycephalic vein  that partially obscures the left subclavian artery. The left vertebral  artery appears to take origin directly off the aortic arch which is also  a normal anatomic variation and unfortunately dense beam hardening  artifact off the densely opacified left brachycephalic vein streaks  through and obscures evaluation of the proximal left vertebral artery  and obscures evaluation. The cervical segment of the left vertebral  artery is widely patent without stenosis. The left common carotid origin  is normal in appearance and no stenosis seen in the left common carotid  artery, its bifurcation in the left internal and external carotid  arteries are normal in appearance. No stenosis seen in the cervical  segment of the left internal carotid artery using the NASCET criteria.  The right common carotid origin is normal in appearance. No stenosis  seen in the right common carotid artery, its bifurcation into the right  internal and external carotid arteries normal in appearance and no  stenosis seen in the cervical segment of the right internal carotid  artery using the NASCET criteria     CT ANGIOGRAM OF THE HEAD: The intracranial segment of the dominant left  vertebral artery is widely patent without stenosis to the  vertebrobasilar junction. The smaller diameter right vertebral artery  gives off the right posterior inferior cerebellar artery and the  post-PICA segment is extremely small in diameter but I believe it is  patent to the vertebrobasilar junction. The basilar artery and the  basilar tip is normal in appearance. There are markedly hypoplastic P1  segments of the posterior cerebral arteries with  dominant bilateral  posterior communicating artery supplying the bulk of the blood flow to  the PCA territories and both posterior cerebral and superior cerebellar  arteries are normal in appearance. The upper cervical, petrous,  cavernous and supracavernous segments of the internal carotid arteries  are within normal limits. The A1 segments of the anterior cerebral  arteries, the anterior communicating artery origin and the A2 segments  of the anterior cerebral arteries are normal in appearance. The M1  segments of the middle cerebral arteries and middle cerebral artery  bifurcations and M2 branch of the middle cerebral arteries are normal in  appearance.       Impression:      1. The noncontrast head CT is without change when compared to a prior  head CT on 01/02/2020, 3 years ago. There is mild-to-moderate small  vessel disease in the cerebral white matter. There is deformity of the  medial right orbital wall either from an old posttraumatic deformity or  a congenital dehiscence. The remainder of the head CT is within normal  limits with no acute infarct or intracranial hemorrhage seen. If there  remains any clinical suspicion of acute infarct causing right upper  extremity weakness an MRI of the brain suggested to further evaluate.   2. 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 I also believe there is direct  origin of the left vertebral artery off the aortic arch which is a  normal anatomic variation. Unfortunately dense beam hardening artifact  off densely opacified brachycephalic vein streaks through the proximal  left vertebral artery and left subclavian artery and obscures  evaluation. The cervical segment of te left vertebral artery  demonstrates mild up to mild-to-moderate stenosis where it takes an  abrupt bend and causes mild kinking of the vessel just above the left  C2  transverse foramen, otherwise no stenosis is seen in the left vertebral  artery. The right vertebral artery is widely patent to the right PICA  origin, post-PICA segment is extremely tiny in diameter but I believe it  is patent to the vertebrobasilar junction. The remainder of the CT  angiogram evaluation of the great vessels of the head and neck is  normal, specifically no stenosis is seen in the cervical segments of the  internal carotid arteries and no intracranial vascular stenoses or  occlusions are identified  3. Cervical spondylosis is present. There is disc space narrowing,  degenerative endplate changes, posterior spurring resulting in mild  canal narrowing at C5-6 and C6-7. There is multilevel foraminal  narrowing seen in the cervical spine with mild left bony foraminal  narrowing at C3-4 and C4-5. There is moderate right bony foraminal  narrowing at C5-6 and moderate bilateral bony foraminal narrowing at  C6-7. The remainder of the CT angiogram of the head and neck is normal.   Results were communicated to the [facility] by telephone  02/09/2023 at 5:30 PM Radiation dose reduction techniques were utilized,  including automated exposure control and exposure modulation based on  body size.          XR Chest 1 View [[id number]] Collected: 02/09/23 1302     Updated: 02/09/23 1309    Narrative:      XR CHEST 1 VW-     HISTORY: Female who is 77 years-old, stroke     TECHNIQUE: Frontal views of the chest     COMPARISON: 01/02/2020     FINDINGS: The heart size is borderline. Left-sided pacemaker, cardiac  leads, sternotomy wires, cardiac valve marker noted. Pulmonary  vasculature is unremarkable. Linear likely scarring or atelectasis in  the right midlung. No focal pulmonary consolidation, pleural effusion,  or pneumothorax. No acute osseous process.       Impression:      No focal pulmonary consolidation. Borderline heart size.  Follow-up as clinical indications persist.     This report was  finalized on 2/9/2023 1:06 PM by Dr. Magno Herrera M.D.             I have reviewed the medications.  ---------------------------------------------------------------------------------------------  Assessment & Plan   Assessment/Problem List    Weakness of right upper extremity      Plan:    Weakness of the right upper extremity  -Right wrist drop  -Right wrist brace in place  -Patient's pacemaker is not compatible with MRI  -CTA of the head and neck shows no significant stenosis  -Neurology consulted, neuroimaging negative we will plan for outpatient EMG  -Outpatient PT referral placed     Paroxysmal atrial fibrillation  -Sinus rhythm on exam  -Continue carvedilol  -Patient follows with Dr. Randall outpatient, advised her she will need to follow-up with him to discuss anticoagulation.  She states that she is not interested in starting anticoagulation unless she has some sort of event to require it, advised patient she needs to follow-up with Dr. Randall.     Systolic heart failure  Hypertension  -Euvolemic on exam  -Continue carvedilol, spironolactone, Entresto     Hyperlipidemia  -Continue statin     Obstructive sleep apnea  -Patient uses CPAP with sleep  -Nocturnal O2 as needed       Disposition: Home    Follow-up after Discharge: Neurology, cardiology      30 minutes have been spent by [facility] providers in the care of this patient while under observation status.      I wore an face mask, eye protection, and gloves during this patient encounter. Patient also wearing a surgical mask. Hand hygeine performed before and after seeing the patient.      Meghana Pérez PA-C 02/09/23 20:00 EST

## 2023-02-10 NOTE — NURSING NOTE
Reviewed and completed discharge summary. Pt daughter at bedside. Pt verbalize understanding. No further questions at this time. Removed IV-needle intact. Removed heart monitor. Pt gathered all belongings. Pt leaving private vehicle.

## 2023-02-10 NOTE — DISCHARGE INSTRUCTIONS
Continue all home medication as prescribed.  Continue to wear your right wrist brace especially while you are sleeping.  You will have a follow-up appointment with neurology to set up an EMG, the office should contact you regarding this follow-up.  Follow-up with your primary care doctor in 1 to 2 weeks.  A referral for outpatient PT has been placed, you should receive a call regarding getting this set up.     Return to the emergency department with worsening symptoms, uncontrolled pain, inability to tolerate oral liquids, fever greater than 101°F not controlled by Tylenol or as needed with emergent concerns.

## 2023-02-10 NOTE — OUTREACH NOTE
Prep Survey    Flowsheet Row Responses   [facility] discharged from? [facility]   Is LACE score < 7 ? No   Eligibility Readm Mgmt   Discharge diagnosis weakness of right upper extremity   Does the patient have one of the following disease processes/diagnoses(primary or secondary)? Other   Does the patient have Home health ordered? No   Is there a DME ordered? No   Prep survey completed? Yes          Kerrie NEVES - Registered Nurse

## 2023-02-10 NOTE — PLAN OF CARE
Problem: Adult Inpatient Plan of Care  Goal: Plan of Care Review  2/9/2023 2037 by Kareen Junior RN  Outcome: Adequate for Care Transition  2/9/2023 2019 by Kareen Junior RN  Outcome: Ongoing, Progressing  2/9/2023 2019 by Kareen Junior RN  Outcome: Ongoing, Progressing  Goal: Patient-Specific Goal (Individualized)  2/9/2023 2037 by Kareen Junior RN  Outcome: Adequate for Care Transition  2/9/2023 2019 by Kareen Junior RN  Outcome: Ongoing, Progressing  2/9/2023 2019 by Kareen Junior RN  Outcome: Ongoing, Progressing  Goal: Absence of Hospital-Acquired Illness or Injury  2/9/2023 2037 by Kareen Junior RN  Outcome: Adequate for Care Transition  2/9/2023 2019 by Kareen Junior RN  Outcome: Ongoing, Progressing  2/9/2023 2019 by Kareen Junior RN  Outcome: Ongoing, Progressing  Intervention: Identify and Manage Fall Risk  Intervention: Prevent Infection  Goal: Optimal Comfort and Wellbeing  2/9/2023 2037 by Kareen Junior RN  Outcome: Adequate for Care Transition  2/9/2023 2019 by Kareen Junior RN  Outcome: Ongoing, Progressing  2/9/2023 2019 by Kareen Junior RN  Outcome: Ongoing, Progressing  Intervention: Provide Person-Centered Care  Goal: Readiness for Transition of Care  2/9/2023 2037 by Kareen Junior RN  Outcome: Adequate for Care Transition  2/9/2023 2019 by Kareen Junior RN  Outcome: Ongoing, Progressing  2/9/2023 2019 by Kareen Junior RN  Outcome: Ongoing, Progressing   Goal Outcome Evaluation:

## 2023-02-10 NOTE — TELEPHONE ENCOUNTER
Called patient to give appointment details for the EMG with Dr. White.  Left voicemail with appointment information.

## 2023-02-10 NOTE — TELEPHONE ENCOUNTER
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  Can you see if she can get in with Dr. Novak in 2-4 weeks for a right upper extremity EMG?    Thanks  Herbert

## 2023-02-13 ENCOUNTER — TELEPHONE (OUTPATIENT)
Dept: NEUROLOGY | Facility: CLINIC | Age: 78
End: 2023-02-13
Payer: MEDICARE

## 2023-02-13 ENCOUNTER — READMISSION MANAGEMENT (OUTPATIENT)
Dept: CALL CENTER | Facility: HOSPITAL | Age: 78
End: 2023-02-13
Payer: MEDICARE

## 2023-02-13 NOTE — OUTREACH NOTE
Medical Week 1 Survey    Flowsheet Row Responses   [facility] patient discharged from? [place]   Does the patient have one of the following disease processes/diagnoses(primary or secondary)? Other   Week 1 attempt successful? Yes   Call start time 1257   Call end time 1307   Discharge diagnosis weakness of right upper extremity   Person spoke with today (if not patient) and relationship Patients   Meds reviewed with patient/caregiver? Yes   Prescription comments No concerns with medications   Is the patient taking all medications as directed (includes completed medication regime)? Yes   Does the patient have a primary care provider?  Yes   Comments regarding PCP Patient has fu with Neurology on 02-15   Has home health visited the patient within 72 hours of discharge? N/A   Psychosocial issues? No   Comments Patient to have outpatient therapy- however no one from therapy has reached out to patient and she has called and left message- Advised patient to call Dr. azar neurologist or pcp for new orders for ourpatient therapy- patient understands   Did the patient receive a copy of their discharge instructions? Yes   Nursing interventions Reviewed instructions with patient   What is the patient's perception of their health status since discharge? Same   Is the patient/caregiver able to teach back signs and symptoms related to disease process for when to call PCP? Yes   Is the patient/caregiver able to teach back signs and symptoms related to disease process for when to call 911? Yes   Is the patient/caregiver able to teach back the hierarchy of who to call/visit for symptoms/problems? PCP, Specialist, Home health nurse, Urgent Care, ED, 911 Yes   Week 1 call completed? Yes   Is the patient interested in additional calls from an ambulatory ?  NOTE:  applies to high risk patients requiring additional follow-up. No   Wrap up additional comments Patient overall doing well- Numbness in R hand she still  has- was told was related to the way she sleeps- patient to follow up and have a EMG with neurology on [facility]- to determine any nerve damage. Patient to change sleep habits and start physical therapy no other concerns or questions noted.          Tisha GILES - Registered Nurse

## 2023-02-13 NOTE — TELEPHONE ENCOUNTER
Patient called and did not want to go to [place]. Spoke johann Duncan and Constance explained that Dr. Mosqueda wanted a quick turn around and [facility] had first opening.  Patient states that she will have to drive to [facility].

## 2023-02-15 ENCOUNTER — PROCEDURE VISIT (OUTPATIENT)
Dept: NEUROLOGY | Facility: CLINIC | Age: 78
End: 2023-02-15
Payer: MEDICARE

## 2023-02-15 VITALS
HEIGHT: 62 IN | BODY MASS INDEX: 31.87 KG/M2 | DIASTOLIC BLOOD PRESSURE: 88 MMHG | OXYGEN SATURATION: 98 % | HEART RATE: 71 BPM | SYSTOLIC BLOOD PRESSURE: 122 MMHG

## 2023-02-15 DIAGNOSIS — R29.898 WEAKNESS OF RIGHT UPPER EXTREMITY: ICD-10-CM

## 2023-02-15 PROCEDURE — 95910 NRV CNDJ TEST 7-8 STUDIES: CPT | Performed by: PSYCHIATRY & NEUROLOGY

## 2023-02-15 PROCEDURE — 95885 MUSC TST DONE W/NERV TST LIM: CPT | Performed by: PSYCHIATRY & NEUROLOGY

## 2023-02-15 NOTE — PROGRESS NOTES
EMG and Nerve Conduction Studies      History: 77-year-old woman with numbness and weakness of the right hand.      Results:  Nerve conduction studies were performed on the right arm.  Right median, radial, and ulnar antidromic sensory nerve action potentials across the wrist segment were normal.  Right median and ulnar compound motor action potentials were normal throughout.  Right median and ulnar F waves were present and normal in latency.  They were reasonably symmetric.    EMG needle examination of the right upper extremity revealed no abnormal insertional activity, spontaneous activity, or motor unit remodeling.  Please see accompanying data for details.    Impression: This nerve conduction study and EMG needle examination of the right upper extremity is normal.    David White M.D.              Dictated utilizing Dragon dictation.

## 2023-02-17 ENCOUNTER — TELEPHONE (OUTPATIENT)
Dept: NEUROLOGY | Facility: CLINIC | Age: 78
End: 2023-02-17

## 2023-02-17 NOTE — TELEPHONE ENCOUNTER
Caller: Olivia Addison    Relationship to patient: Self    Best call back number: [phone]    New or established patient?  [x] New  [] Established    Date of discharge: 2-9-23    Facility discharged from: [facility]    Diagnosis/Symptoms: WEAKNESS OF RUE.      Length of stay (If applicable): 12 HOURS    Specialty Only: Did you see a [facility] provider?    [x] Yes  [] No  If so, who? DR DUVALL    PT HAD HER EMG/NCV AT [place] AND NOW NEEDS AN APPT WITH DR. DUVALL.    PLEASE CALL & ADVISE

## 2023-02-20 NOTE — TELEPHONE ENCOUNTER
Provider: KELSI DUVALL MD    Caller: HOMER    Relationship to Patient: SELF    Phone Number: [phone]    Reason for Call:  PT CALLING REGARDING THE HOSPITAL F/U THAT WAS SENT ON 2-17-23.  STATED SHE HAS NOT HEARD BACK.  STATED 3 OF HER FINGERS ON HER RIGHT HAND ARE STILL NUMB, SINCE 2-9-23.  STATED THEY HAVE NOT IMPROVED.  STATED SHE HAS HAD HER EMG IN [place].  STATED PROVIDER HAS SENT THE RESULTS TO DR. DUVALL.  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    PLEASE CALL &ADVISE

## 2023-02-22 ENCOUNTER — READMISSION MANAGEMENT (OUTPATIENT)
Dept: CALL CENTER | Facility: HOSPITAL | Age: 78
End: 2023-02-22
Payer: MEDICARE

## 2023-02-22 NOTE — OUTREACH NOTE
Medical Week 2 Survey    Flowsheet Row Responses   [facility] patient discharged from? [facility]   Does the patient have one of the following disease processes/diagnoses(primary or secondary)? Other   Week 2 attempt successful? Yes   Call start time 1432   Discharge diagnosis weakness of right upper extremity   Call end time 1438   Meds reviewed with patient/caregiver? Yes   Is the patient having any side effects they believe may be caused by any medication additions or changes? No   Is the patient taking all medications as directed (includes completed medication regime)? Yes   Does the patient have a primary care provider?  Yes   Does the patient have an appointment with their PCP within 7 days of discharge? Yes   Has the patient kept scheduled appointments due by today? Yes   Comments She has had her EMG and she is not willing to wait much longer on neurology. She will reach out to her PCP and set an appt.   Has home health visited the patient within 72 hours of discharge? N/A   Psychosocial issues? No   Did the patient receive a copy of their discharge instructions? Yes   Nursing interventions Reviewed instructions with patient   What is the patient's perception of their health status since discharge? Same   Is the patient/caregiver able to teach back signs and symptoms related to disease process for when to call PCP? Yes   Is the patient/caregiver able to teach back signs and symptoms related to disease process for when to call 911? Yes   Is the patient/caregiver able to teach back the hierarchy of who to call/visit for symptoms/problems? PCP, Specialist, Home health nurse, Urgent Care, ED, 911 Yes   If the patient is a current smoker, are they able to teach back resources for cessation? Not a smoker   Additional teach back comments Still with numbness in 3 fingers on right hand.   Week 2 Call Completed? Yes   Is the patient interested in additional calls from an ambulatory ?  NOTE:  applies  to high risk patients requiring additional follow-up. 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          Destiny SAUER - Registered Nurse

## 2023-02-28 ENCOUNTER — TELEPHONE (OUTPATIENT)
Dept: NEUROLOGY | Facility: CLINIC | Age: 78
End: 2023-02-28

## 2023-02-28 NOTE — TELEPHONE ENCOUNTER
Provider: THORNTON  Caller: KURT  Relationship to Patient: INEZ  Pharmacy: N/A  Phone Number: [phone]  Reason for Call: PT CALLED AND TO TRY AND GET A HOSPITAL FOLLOW UP. PT HAS CALLED 4 TIMES SINCE 02/09/2023 TO TRY AND GET A HOSPITAL FOLLOW UP. PT SATES THAT 3 FINGERS ON THE RIGHT HAD ARE STILL NUMB AND JADIEL SAID FOR A 3 WEEK FOLLOW UP. 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    PLEASE REVIEW AND ADVISE.  THANK YOU.

## 2023-03-02 ENCOUNTER — OFFICE VISIT (OUTPATIENT)
Dept: NEUROLOGY | Facility: CLINIC | Age: 78
End: 2023-03-02
Payer: MEDICARE

## 2023-03-02 VITALS
BODY MASS INDEX: 32.76 KG/M2 | DIASTOLIC BLOOD PRESSURE: 84 MMHG | HEART RATE: 65 BPM | OXYGEN SATURATION: 96 % | WEIGHT: 178 LBS | HEIGHT: 62 IN | SYSTOLIC BLOOD PRESSURE: 130 MMHG

## 2023-03-02 DIAGNOSIS — R20.0 NUMBNESS AND TINGLING IN RIGHT HAND: Primary | ICD-10-CM

## 2023-03-02 DIAGNOSIS — R20.2 NUMBNESS AND TINGLING IN RIGHT HAND: Primary | ICD-10-CM

## 2023-03-02 DIAGNOSIS — R29.898 WEAKNESS OF RIGHT UPPER EXTREMITY: ICD-10-CM

## 2023-03-02 PROCEDURE — 99214 OFFICE O/P EST MOD 30 MIN: CPT | Performed by: PSYCHIATRY & NEUROLOGY

## 2023-03-02 NOTE — PROGRESS NOTES
Notes by MA:  Patient presents today with c/o continued right hand numbness. Pt has had EMG. Patient sts her last three fingers of her right hand are still numb. Pt reports her wrist movement has improved.        Subjective:     Patient ID: Olivia Addison is a 77 y.o. female.    History of Present Illness  The following portions of the patient's history were reviewed and updated as appropriate: allergies, current medications, past family history, past medical history, past social history, past surgical history and problem list.    Review of Systems   Neurological: Positive for weakness (right hand), numbness (right hand) and headaches. Negative for dizziness, tremors, seizures, syncope, facial asymmetry, speech difficulty and light-headedness.   Psychiatric/Behavioral: Negative for agitation, behavioral problems, confusion, decreased concentration, dysphoric mood, hallucinations, self-injury, sleep disturbance and suicidal ideas. The patient is not nervous/anxious and is not hyperactive.         Objective:    Neurologic Exam  Awake and alert pleasant and cooperative.  Cognitively preserved.  Normal speech and language function.    Cranial nerves II through XII normal and symmetric.  Motor exam reveals reasonable tone bulk and power in both lower extremities.  In the upper extremities, she has no drift.  Intrinsic hand muscles appear to be reasonably preserved.  There may be subtle weakness in right finger abduction.  There is no wrist extension weakness.  There is minor finger flexion weakness on the right but not on the left.  [place].  Thenar muscles appear to be well preserved.  No Tinel's sign at the wrist or at the elbow.  Tendon reflexes are 2+ in the arms and in the legs and symmetric.  Toes are downgoing.  No long tract findings.  Physical Exam    Assessment/Plan:     Diagnoses and all orders for this visit:    1. Numbness and tingling in right hand (Primary)  -     EMG & Nerve Conduction Test;  Future       Clinically, her complaints and exam findings are suggestive of a right ulnar neuropathy.  If so this would most likely be at the elbow.  I cannot exclude a low cervical radiculopathy.  I think her electrodiagnostic testing was done a little bit too early (only 1 week after onset of symptoms) and we will repeat that at the 4-week yudi to help guide further work-up and/or therapeutics.  Discussed this with her at length.  We will see her back when she returns for electrodiagnostics.

## 2023-03-08 ENCOUNTER — PROCEDURE VISIT (OUTPATIENT)
Dept: NEUROLOGY | Facility: CLINIC | Age: 78
End: 2023-03-08
Payer: MEDICARE

## 2023-03-08 VITALS
HEART RATE: 72 BPM | DIASTOLIC BLOOD PRESSURE: 78 MMHG | WEIGHT: 178 LBS | SYSTOLIC BLOOD PRESSURE: 120 MMHG | BODY MASS INDEX: 32.76 KG/M2 | OXYGEN SATURATION: 98 % | HEIGHT: 62 IN

## 2023-03-08 DIAGNOSIS — G56.21 ULNAR NEUROPATHY AT WRIST, RIGHT: Primary | ICD-10-CM

## 2023-03-08 PROCEDURE — 95908 NRV CNDJ TST 3-4 STUDIES: CPT | Performed by: PSYCHIATRY & NEUROLOGY

## 2023-03-08 PROCEDURE — 95886 MUSC TEST DONE W/N TEST COMP: CPT | Performed by: PSYCHIATRY & NEUROLOGY

## 2023-03-08 PROCEDURE — 99214 OFFICE O/P EST MOD 30 MIN: CPT | Performed by: PSYCHIATRY & NEUROLOGY

## 2023-03-08 NOTE — PROGRESS NOTES
Subjective:     Patient ID: Olivia Addison is a 77 y.o. female.    History of Present Illness  The following portions of the patient's history were reviewed and updated as appropriate: allergies, current medications, past family history, past medical history, past social history, past surgical history and problem list.    Review of Systems   Constitutional: Negative for activity change, appetite change and fatigue.   HENT: Negative for facial swelling, trouble swallowing and voice change.    Eyes: Negative for photophobia, pain and visual disturbance.   Respiratory: Negative for chest tightness, shortness of breath and wheezing.    Cardiovascular: Negative for chest pain, palpitations and leg swelling.   Gastrointestinal: Negative for abdominal pain, nausea and vomiting.   Endocrine: Negative for cold intolerance and heat intolerance.   Musculoskeletal: Negative for arthralgias, back pain, gait problem, joint swelling, myalgias, neck pain and neck stiffness.   Neurological: Positive for numbness. Negative for dizziness, tremors, seizures, syncope, facial asymmetry, speech difficulty, weakness, light-headedness and headaches.   Hematological: Does not bruise/bleed easily.   Psychiatric/Behavioral: Negative for agitation, behavioral problems, confusion, decreased concentration, dysphoric mood, hallucinations, self-injury, sleep disturbance and suicidal ideas. The patient is not nervous/anxious and is not hyperactive.         Objective:    Neurologic Exam  Awake and alert pleasant and cooperative.  Cognitively preserved.  Normal speech and language function.     Cranial nerves II through XII normal and symmetric.  Motor exam reveals reasonable tone bulk and power in both lower extremities.  In the upper extremities, she has no drift.  Intrinsic hand muscles appear to be reasonably preserved.  There may be subtle weakness in right finger abduction.  There is no wrist extension weakness.  There is minor finger flexion  weakness on the right but not on the left.  [place].  Thenar muscles appear to be well preserved.  No Tinel's sign at the wrist or at the elbow.  Tendon reflexes are 2+ in the arms and in the legs and symmetric.  Toes are downgoing.  No long tract findings.  Physical Exam    Assessment/Plan:     Diagnoses and all orders for this visit:    1. Ulnar neuropathy at wrist, right (Primary)     Please see electrodiagnostic data below.  Today's testing confirms a mild right ulnar neuropathy that appears to be at Guyon's canal.  I discussed this with her.  I think this is likely due to her nighttime wrist positioning.  We have decided on a conservative approach to begin with and I have asked her to start wearing a wrist splint nightly for the next 6 weeks.            EMG and Nerve Conduction Studies      History: 77-year-old woman with 4 weeks of numbness over the medial aspect of the right hand      Results: Nerve conduction studies were performed on the right upper extremity and imaging was performed across the elbow segment.  Right median and ulnar antidromic sensory nerve action potentials across the wrist segment were normal.  Right ulnar antidromic sensory nerve action potentials across the wrist segment were prolonged in latency but otherwise normal.  Right median and ulnar compound motor action potentials were normal throughout including inching across the elbow segment of the ulnar nerve.  Right median and ulnar F waves were present and normal in latency.  They were reasonably symmetric.    EMG needle examination of the right upper extremity and cervical paraspinal muscles revealed no abnormal insertional activity, spontaneous activity, or motor unit remodeling.  Please see accompanying data for sampled muscles.    Impression: This nerve conduction study and EMG needle examination is abnormal because of the presence of right ulnar neuropathy at the wrist.  This is electrophysiologically mild in severity,  affecting only sensory fibers.    David White M.D.              Dictated utilizing Dragon dictation.

## 2023-03-15 ENCOUNTER — HOSPITAL ENCOUNTER (OUTPATIENT)
Dept: PHYSICAL THERAPY | Facility: HOSPITAL | Age: 78
Setting detail: THERAPIES SERIES
Discharge: HOME | End: 2023-03-15
Payer: MEDICARE

## 2023-03-15 DIAGNOSIS — M62.81 MUSCLE WEAKNESS: ICD-10-CM

## 2023-03-15 DIAGNOSIS — G56.21 NEUROPATHY OF RIGHT ULNAR NERVE AT WRIST: Primary | ICD-10-CM

## 2023-03-15 DIAGNOSIS — M25.631 DECREASED RANGE OF MOTION OF RIGHT WRIST: ICD-10-CM

## 2023-03-15 PROCEDURE — 97162 PT EVAL MOD COMPLEX 30 MIN: CPT

## 2023-03-15 PROCEDURE — 97110 THERAPEUTIC EXERCISES: CPT

## 2023-03-15 PROCEDURE — 97530 THERAPEUTIC ACTIVITIES: CPT

## 2023-03-15 NOTE — THERAPY EVALUATION
Outpatient Physical Therapy Ortho Initial Evaluation  [facility]     Patient Name: Olivia Addison  : 1945  MRN: [id number]  Today's Date: 3/15/2023      Visit Date: 03/15/2023    Patient Active Problem List   Diagnosis   • H/O tricuspid valve repair   • Abdominal pain   • Abdominal pain, left lower quadrant   • Acute conjunctivitis   • Adjustment disorder with anxious mood   • Alcohol abuse   • Anxiety   • Attention deficit disorder   • Biventricular implantable cardioverter-defibrillator in situ   • Cardiomyopathy, nonischemic (HCC)   • Diverticulitis of colon   • Diverticulosis of intestine   • Flank pain   • H/O Estrellita-Biswas syndrome   • Hematuria   • Hypercholesterolemia   • Low back pain   • AMADO (obstructive sleep apnea)   • Posttraumatic stress disorder   • PTSD (post-traumatic stress disorder)   • Urinary tract infection   • S/P MVR (mitral valve repair)   • Diverticulitis   • Class 1 obesity in adult   • LBBB (left bundle branch block)   • CKD (chronic kidney disease) stage 3, GFR 30-59 ml/min ([facility])   • Weakness of right upper extremity   • Numbness and tingling in right hand   • Ulnar neuropathy at wrist, right        Past Medical History:   Diagnosis Date   • ADHD (attention deficit hyperactivity disorder)    • Adjustment disorder with anxiety    • Allergic rhinitis    • Anxiety and depression    • Arthritis    • Atherosclerosis    • Atrial fibrillation ([facility])     HX   • Atrophic vaginitis    • Blepharitis of both eyes    • CAD (coronary artery disease)    • Cardiomyopathy ([facility])     NON ISCHEMIC    • Cataract    • CHF (congestive heart failure) ([facility])     CHRONIC SYSTOLIC   • CKD (chronic kidney disease) stage 3, GFR 30-59 ml/min ([facility])    • DDD (degenerative disc disease), cervical    • Diaphoresis 2019    SEEN AT [facility]   • Diverticulitis 2019    SEEN AT [facility]   • Diverticulitis 11/15/2016   • Diverticulitis 2014   • Diverticulitis    • Diverticulitis large intestine  05/23/2017   • Diverticulitis of colon 08/22/2019    ADMITTED TO [facility]   • Dry eye syndrome    • Dyspnea    • Dysuria 06/2019   • Esophageal injury     ESTRELLITA-RAMSO SYNDROME   • Fatigue    • Heart failure (HCC)    • Hematuria 08/2019   • History of frequent urinary tract infections    • History of mitral valve repair    • History of sepsis     FROM UTI   • Hypercholesterolemia    • Hyperlipidemia    • Hypertension    • Kidney cysts 07/2017    FOLLOWED BY DR. 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 HARMAN CÁRDENAS   • Segmental and somatic dysfunction of cervical region    • Segmental and somatic dysfunction of thoracic region    • SOB (shortness of breath) on exertion     R/T HEART   • Vitamin D deficiency    • VT (ventricular tachycardia)         Past Surgical History:   Procedure Laterality Date   • APPENDECTOMY N/A    • BIVENTRICULLAR IMPLANTABLE CARDIOVERTER DEFIBRILLATOR PLACEMENT N/A 04/10/2012    [employer], BI-V, DR. MICKEY MORALES AT [facility]   • BLADDER SUSPENSION N/A    • BLEPHAROPLASTY Bilateral 3/3/2022    Procedure: BILATERAL UPPER LID BLEPHAROPLASTY;  Surgeon: Edu Power MD;  Location: [facility] OR [facility];  Service: Ophthalmology;  Laterality: Bilateral;   • BLEPHAROPLASTY Bilateral 3/3/2022    Procedure: BILATERAL LOWER LID BLEPHAROPLASTY;  Surgeon: Edu Power MD;  Location:  [facility] OR [facility] [place];  Service: New Image;  Laterality: Bilateral;   • BREAST CYST ASPIRATION     • BREAST  SURGERY Bilateral     REDUCTION MAMMAPLASTY   • BROW LIFT Bilateral 3/3/2022    Procedure: BILATERAL LATERAL BROWPEXY;  Surgeon: Edu Power MD;  [place] [facility] [place] [facility] [place];  Service: Ophthalmology;  Laterality: Bilateral;   • CARDIAC CATHETERIZATION Bilateral 03/19/2012    Normal coronary arteries, severe mitral regurgitation and aortic regurgitation. EF was about 30-35%.; DR. JYOTI ROMERO AT [facility]   • CARDIAC ELECTROPHYSIOLOGY PROCEDURE Left 7/11/2016    Procedure: PPM battery change [employer]TR[employer];  Surgeon: Hunter Faust MD;  Location: [facility] CATH INVASIVE LOCATION;  Service:    • CATARACT EXTRACTION Bilateral    • COLONOSCOPY N/A 10/14/2014    PANDIVERTICULOSIS IN ENTIRE COLON, DR. BRANDEN RUIZ AT [facility]   • COLONOSCOPY N/A 11/13/2019    SCATTERED SMALL AND LARGE DIVERTICULA IN ENTIRE COLON, RESCOPE IN 10 YRS, DR. PERCY MUHAMMAD AT [facility]   • ENDOSCOPY N/A 06/06/2012    CHAD-RAMOS TEAR WITH ACTIVE BLEEDINGBLOOD THROUGHOUT STOMACH, BLOOD IN DUODENUM, DR. TARUN GILLIS AT [facility]   • ESOPHAGUS SURGERY      TEAR DURING HEART SURGERY, AT BOTTOM OF ESOPHAGUS   • HYSTERECTOMY N/A 1980    OLIVIA WITH BSO   • IMPLANTABLE CARDIOVERTER DEFIBRILLATOR LEAD REPLACEMENT/POCKET REVISION N/A 08/28/2012    DR. GISELLE BOOGIE AT [facility]   • MITRAL VALVE REPAIR/REPLACEMENT N/A 06/06/2012    REMODELING  ANULOPLASTY USING 24 MM BARAJAS ANULOPLASTY RING, MODEL 5200, SERIAL # [id number], DR. VINCE ZAMUDIO AT [facility]   • PATENT FORAMEN OVALE CLOSURE N/A 06/06/2012    DR. VINCE ZAMUDIO AT [facility]   • TONSILLECTOMY Bilateral    • TRICUSPID VALVE SURGERY N/A 06/06/2012    REMODELING ANULOPLASTY USING 26 MM BARAJAS ANULOPLASTY RING, MODEL 6200, SERIAL # [id number], DR. VINCE ZAMUDIO AT [facility]   • VAGINAL DELIVERY N/A 1974       Visit Dx:     ICD-10-CM ICD-9-CM   1. 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 Decreased range of motion of right wrist  M25.631 719.53   3. Muscle weakness  M62.81 728.87          Patient History      Row Name 03/15/23 0900             History    Chief Complaint Numbness  -CC      Brief Description of Current Complaint Olivia Addison is a RHD 77 y.o. female who presents today with R UE n/t. Pt reports woke up Feb 9 and thought she was having stroke because she couldn't move and had numbness up to elbow on R UE. Reports she sleeps with R UE on her head with wrist extended and elbow flexed. Reports most symptoms have resolved since that time, still has numbness in her medial 3 digits. Pt denies pain, reports only numbness. Pt has had nerve conduction study, repots ulnar n affected at wrist. Neck and elbow have been cleared. Olivia Addison reports difficulty with gripping, writing, holding a pencil, drops things, fastening seat belt, and brushing teeth. PMH includes chronic neck pain, cardiac history - defibrillator/pacemaker, CHF. Pt also has LBP with R sided sciatica, also on referral but will eval at later date.  -CC      Occupation/sports/leisure activities Retired teacher, substitues sometiems still  -CC      What clinical tests have you had for this problem? Nerve Conduction Test;CT scan  -CC      Results of Clinical Tests CT - degerenerative changes C spine  -CC         Pain     Pain at Present 0  -CC         Fall Risk Assessment    Any falls in the past year: Yes  -CC            User Key  (r) = Recorded By, (t) = Taken By, (c) = Cosigned By    Initials Name Provider Type    CC Gianna Muhammad, PT Physical Therapist                 PT Ortho     Row Name 03/15/23 0900       Posture/Observations    Posture/Observations Comments mild fwd head rounded shoulders  -CC       Sensory Screen for Light Touch- Upper Quarter Clearing    C4 (posterior shoulder) Bilateral:;Intact  -CC    C5 (lateral upper arm) Bilateral:;Intact  -CC    C6 (tip of thumb) Bilateral:;Intact  -CC    C7 (tip of 3rd finger) Right:;Diminished;Left:;Intact  -CC    C8 (tip of 5th finger) Right:;Diminished;Left:;Intact  -CC    T1 (medial  lower arm) Bilateral:;Intact  -CC       Myotomal Screen- Upper Quarter Clearing    Shoulder flexion (C5) Bilateral:;4+ (Good +)  -CC    Finger abduction (T1) Bilateral:;4 (Good)  -CC       Cervical/Shoulder ROM Screen    Cervical flexion Normal  -CC    Cervical extension Normal  -CC    Cervical rotation Impaired  see measurements below  -CC       Upper Level Neural Tension Tests    Median Neural Tension Test Right:;Negative  -CC    Radial Neural Tension Test Right:;Negative  -CC    Ulnar Neural Tension Test Right:;Negative  -CC       General ROM    Head/Neck/Trunk Neck Extension;Neck Flexion;Neck Lt Rotation;Neck Rt Rotation  -CC    RT Upper Ext Rt Wrist Flexion;Rt Wrist Extension  -CC    LT Upper Ext Lt Wrist Flexion;Lt Wrist Extension  -CC       Head/Neck/Trunk    Neck Extension AROM 50  -CC    Neck Flexion AROM 30  -CC    Neck Lt Rotation AROM 40  -CC    Neck Rt Rotation AROM 60  -CC       Right Upper Ext    Rt Wrist Flexion AROM 60  pain  -CC    Rt Wrist Extension AROM 45  pain  -CC       MMT (Manual Muscle Testing)    Rt Upper Ext Rt Elbow Extension;Rt Elbow Flexion;Rt Wrist Extension;Rt Wrist Flexion  -CC    Lt Upper Ext Lt Elbow Extension;Lt Elbow Flexion;Lt Wrist Flexion;Lt Wrist Extension  -CC       MMT Right Upper Ext    Rt Elbow Flexion MMT, Gross Movement: (5/5) normal  -CC    Rt Elbow Extension MMT, Gross Movement: (5/5) normal  -CC    Rt Wrist Flexion MMT, Gross Movement (4-/5) good minus  -CC    Rt Wrist Extension MMT, Gross Movement (4-/5) good minus  -CC       MMT Left Upper Ext    Lt Elbow Flexion MMT, Gross Movement (5/5) normal  -CC    Lt Elbow Extension MMT, Gross Movement (5/5) normal  -CC    Lt Wrist Flexion MMT, Gross Movement (5/5) normal  -CC    Lt Wrist Extension MMT, Gross Movement (5/5) normal  -CC        Strength Right    # Reps 3  -CC    Right Rung 2  -CC    Right  Test 1 24  -CC    Right  Test 2 29  -CC    Right  Test 3 35  -CC     Strength Average Right 29.33  -CC         Strength Left    # Reps 3  -CC    Left Rung 2  -CC    Left  Test 1 30  -CC    Left  Test 2 26  -CC    Left  Test 3 25  -CC     Strength Average Left 27  -CC       Sensation    Light Touch Partial deficits in the RLE  -CC          User Key  (r) = Recorded By, (t) = Taken By, (c) = Cosigned By    Initials Name Provider Type    CC Gianna Muhammad, PT Physical Therapist                            Therapy Education  Education Details: Access Code [email or web address]; eval findings, POC, wrist splint  Given: HEP, Symptoms/condition management, Pain management, Posture/body mechanics  Program: New  How Provided: Verbal, Demonstration, Written  Provided to: Patient  Level of Understanding: Teach back education performed, Verbalized, Demonstrated      PT OP Goals     Row Name 03/15/23 1200          PT Short Term Goals    STG Date to Achieve 04/14/23  -     STG 1 Pt will be independent and verbalized good understanding of initial HEP to improve ability to manage pain, as well as improve strength and ROM.  -     STG 1 Progress New  -     STG 2 Pt will report compliance with night wrist splint to reduce prolonged positions contributing to nerve irritation.  -     STG 2 Progress New  -        Long Term Goals    LTG Date to Achieve 05/14/23  -     LTG 1 Pt will be independent and verbalized good understanding of advanced HEP to improve ability to manage pain, as well as improve strength and ROM.  -     LTG 1 Progress New  -     LTG 2 Pt will improve DASH score to at least </=15% perceived disability to show overall improved quality of life.  -     LTG 2 Progress New  -     LTG 3 Pt will improve R wrist strength to at least>/=4/5 MMT to improve participation in ADLs.  -     LTG 3 Progress New  -     LTG 4 Pt will improve R  strength to at least >32 lbs to reach age based norms and improve  -     LTG 4 Progress New  [facility]        Time Calculation    PT Goal Re-Cert Due Date 06/13/23   -CC           User Key  (r) = Recorded By, (t) = Taken By, (c) = Cosigned By    Initials Name Provider Type    CC Gianna Muhammad, PT Physical Therapist                 PT Assessment/Plan     Row Name 03/15/23 1200          PT Assessment    Functional Limitations Limitation in home management;Limitations in community activities;Limitations in functional capacity and performance;Performance in leisure activities;Performance in self-care ADL;Performance in work activities  -CC     Impairments Sensation;Range of motion;Posture;Poor body mechanics;Pain;Muscle strength;Joint mobility;Impaired flexibility  -CC     Assessment Comments Olivia Addison is a 77 y.o. female referred to physical therapy for R ulnar nerve neuropathy at wrist. She presents with a stable clinical presentation, along with comorbidities of cardiac history and hx of chronic neck and back pain and no remarkable personal factors that may impact her progress in the plan of care. Pt presents today with decreased R wrist range of motion, R wrist weakness, decreased R digits 3-5 sensation (ulnar nerve distribution), R  strength weakness, fwd posture, decreased cervical ROM. Her signs and symptoms are consistent with referring diagnosis. The previous impairments limit her ability to write, hold/carry objects, and perform ADLs. Pt will benefit from skilled PT to address the previous impairments and return to PLOF.  -CC     Please refer to paper survey for additional self-reported information Yes  -CC     Rehab Potential Good  -CC     Patient/caregiver participated in establishment of treatment plan and goals Yes  -CC     Patient would benefit from skilled therapy intervention Yes  -CC        PT Plan    PT Frequency 1x/week;2x/week  -CC     Predicted Duration of Therapy Intervention (PT) 3-5 visits for wrist the re-eval for back pain (same referral)  -CC     Planned CPT's? PT EVAL MOD COMPLELITY: 48940;PT RE-EVAL: 85248;PT THER PROC EA 15 MIN:  80237;PT THER ACT EA 15 MIN: 12220;PT MANUAL THERAPY EA 15 MIN: 73258;PT NEUROMUSC RE-EDUCATION EA 15 MIN: 98614;PT GAIT TRAINING EA 15 MIN: 88427;PT SELF CARE/HOME MGMT/TRAIN EA 15: 58457;PT HOT OR COLD PACK TREAT MCARE  -CC     PT Plan Comments Re-eval for LBP in future. Next visit: review HEP, review wrist splint with pt, add chin tuck, low row, wrist flex/ext str, towel rip, finger opposition, wrist tendon glides, finger abd/add,  strength. Administer quick dash  -CC           User Key  (r) = Recorded By, (t) = Taken By, (c) = Cosigned By    Initials Name Provider Type    Gianna Murillo PT Physical Therapist                   OP Exercises     Row Name 03/15/23 1200             Total Minutes    44989 - PT Therapeutic Exercise Minutes 14  -CC      56825 - PT Therapeutic Activity Minutes 10  -CC         Exercise 1    Exercise Name 1 wrist circumduction  -CC      Cueing 1 Verbal  -CC      Reps 1 [address]  -CC         Exercise 2    Exercise Name 2 wrist flex/ext  -CC      Cueing 2 Verbal  -CC      Reps 2 [address]  -CC      Time 2 table supported  -CC         Exercise 3    Exercise Name 3 wrist ulnar/radial deviation  -CC      Cueing 3 Verbal  -CC      Reps 3 [address]  -CC      Time 3 table supportd  -CC         Exercise 4    Exercise Name 4 shoulder rolls  -CC      Cueing 4 Verbal  -CC      Reps 4 10  -CC         Exercise 5    Exercise Name 5 scap retract  -CC      Cueing 5 Verbal  -CC      Reps 5 10  -CC         Exercise 6    Exercise Name 6 discussed wrist brace/night splint, pt has one to bring next visit  -CC      Cueing 6 Verbal  -CC            User Key  (r) = Recorded By, (t) = Taken By, (c) = Cosigned By    Initials Name Provider Type    Gianna Murillo PT Physical Therapist                              Outcome Measure Options: Quick DASH  Quick DASH  Open a tight or new jar.: Severe Difficulty  Do heavy household chores (e.g., wash walls, wash floors): Severe Difficulty  Carry a  shopping bag or briefcase: Moderate Difficulty  Wash your back: Moderate Difficulty  Use a knife to cut food: Moderate Difficulty  Recreational activities in which you take some force or impact through your arm, should or hand (e.g. golf, hammering, tennis, etc.): Severe Difficulty  During the past week, to what extent has your arm, shoulder, or hand problem interfered with your normal social activites with family, friends, neighbors or groups?: Quite a bit  During the past week, were you limited in your work or other regular daily activities as a result of your arm, shoulder or hand problem?: Very limited  Arm, Shoulder, or hand pain: Moderate  Tingling (pins and needles) in your arm, shoulder, or hand: Severe  During the past week, how much difficulty have you had sleeping because of the pain in your arm, shoulder or hand?: Mild Difficulty  Number of Questions Answered: 11  Quick DASH Score: 61.36         Time Calculation:     Start Time: 0917  Stop Time: 1005  Time Calculation (min): 48 min  Total Timed Code Minutes- PT: 24 minute(s)  Timed Charges  69988 - PT Therapeutic Exercise Minutes: 14  93190 - PT Therapeutic Activity Minutes: 10  Untimed Charges  PT Eval/Re-eval Minutes: 24  Total Minutes  Timed Charges Total Minutes: 24  Untimed Charges Total Minutes: 24   Total Minutes: 48     Therapy Charges for Today     Code Description Service Date Service Provider Modifiers Qty    92336958999 HC PT THER PROC EA 15 MIN 3/15/2023 Gianna Muhammad, PT GP 1    91870186643 HC PT THERAPEUTIC ACT EA 15 MIN 3/15/2023 Gianna Muhammad, PT GP 1    63594141544 HC PT EVAL MOD COMPLEXITY 2 3/15/2023 Gianna Muhammad, PT GP 1          PT G-Codes  Outcome Measure Options: Quick DASH  Quick DASH Score: 61.36         Gianna Muhammad PT  3/15/2023

## 2023-03-22 ENCOUNTER — HOSPITAL ENCOUNTER (OUTPATIENT)
Dept: PHYSICAL THERAPY | Facility: HOSPITAL | Age: 78
Setting detail: THERAPIES SERIES
Discharge: HOME | End: 2023-03-22
Payer: MEDICARE

## 2023-03-22 DIAGNOSIS — M25.631 DECREASED RANGE OF MOTION OF RIGHT WRIST: ICD-10-CM

## 2023-03-22 DIAGNOSIS — G56.21 NEUROPATHY OF RIGHT ULNAR NERVE AT WRIST: Primary | ICD-10-CM

## 2023-03-22 DIAGNOSIS — M62.81 MUSCLE WEAKNESS: ICD-10-CM

## 2023-03-22 PROCEDURE — 97110 THERAPEUTIC EXERCISES: CPT

## 2023-03-22 PROCEDURE — 97530 THERAPEUTIC ACTIVITIES: CPT

## 2023-03-22 NOTE — THERAPY TREATMENT NOTE
Outpatient Physical Therapy Ortho Treatment Note  [facility]     Patient Name: Olivia Addison  : 1945  MRN: [id number]  Today's Date: 3/22/2023      Visit Date: 2023    Visit Dx:    ICD-10-CM ICD-9-CM   1. 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 Decreased range of motion of right wrist  M25.631 719.53   3. Muscle weakness  M62.81 728.87       Patient Active Problem List   Diagnosis   • H/O tricuspid valve repair   • Abdominal pain   • Abdominal pain, left lower quadrant   • Acute conjunctivitis   • Adjustment disorder with anxious mood   • Alcohol abuse   • Anxiety   • Attention deficit disorder   • Biventricular implantable cardioverter-defibrillator in situ   • Cardiomyopathy, nonischemic (HCC)   • Diverticulitis of colon   • Diverticulosis of intestine   • Flank pain   • H/O Estrellita-Biswas syndrome   • Hematuria   • Hypercholesterolemia   • Low back pain   • AMADO (obstructive sleep apnea)   • Posttraumatic stress disorder   • PTSD (post-traumatic stress disorder)   • Urinary tract infection   • S/P MVR (mitral valve repair)   • Diverticulitis   • Class 1 obesity in adult   • LBBB (left bundle branch block)   • CKD (chronic kidney disease) stage 3, GFR 30-59 ml/min ([facility])   • Weakness of right upper extremity   • Numbness and tingling in right hand   • Ulnar neuropathy at wrist, right        Past Medical History:   Diagnosis Date   • ADHD (attention deficit hyperactivity disorder)    • Adjustment disorder with anxiety    • Allergic rhinitis    • Anxiety and depression    • Arthritis    • Atherosclerosis    • Atrial fibrillation ([facility])     HX   • Atrophic vaginitis    • Blepharitis of both eyes    • CAD (coronary artery disease)    • Cardiomyopathy ([facility])     NON ISCHEMIC    • Cataract    • CHF (congestive heart failure) ([facility])     CHRONIC SYSTOLIC   • CKD (chronic kidney disease) stage 3, GFR 30-59 ml/min ([facility])    • DDD (degenerative disc disease), cervical    • Diaphoresis  05/23/2019    SEEN AT [facility] UC   • Diverticulitis 09/02/2019    SEEN AT [facility] ER   • Diverticulitis 11/15/2016   • Diverticulitis 09/07/2014   • Diverticulitis    • Diverticulitis large intestine 05/23/2017   • Diverticulitis of colon 08/22/2019    ADMITTED TO [facility]   • Dry eye syndrome    • Dyspnea    • Dysuria 06/2019   • Esophageal injury     ESTRELLITA-RAMOS SYNDROME   • Fatigue    • Heart failure (HCC)    • Hematuria 08/2019   • History of frequent urinary tract infections    • History of mitral valve repair    • History of sepsis     FROM UTI   • Hypercholesterolemia    • Hyperlipidemia    • Hypertension    • Kidney cysts 07/2017    FOLLOWED BY DR. 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 HARMAN CÁRDENAS   • Segmental and somatic dysfunction of cervical region    • Segmental and somatic dysfunction of thoracic region    • SOB (shortness of breath) on exertion     R/T HEART   • Vitamin D deficiency    • VT (ventricular tachycardia)         Past Surgical History:   Procedure Laterality Date   • APPENDECTOMY N/A    • BIVENTRICULLAR IMPLANTABLE CARDIOVERTER DEFIBRILLATOR PLACEMENT N/A 04/10/2012    [employer], BI-V, DR. MICKEY MORALES AT [facility]   • BLADDER SUSPENSION N/A    • BLEPHAROPLASTY Bilateral 3/3/2022    Procedure: BILATERAL UPPER LID BLEPHAROPLASTY;  Surgeon: Edu Power MD;  [place] [facility] [place] [facility] [place]  Service: Ophthalmology;  Laterality: Bilateral;   • BLEPHAROPLASTY Bilateral 3/3/2022     Procedure: BILATERAL LOWER LID BLEPHAROPLASTY;  Surgeon: Edu Power MD;  [facility];  Service: New Image;  Laterality: Bilateral;   • BREAST CYST ASPIRATION     • BREAST SURGERY Bilateral     REDUCTION MAMMAPLASTY   • BROW LIFT Bilateral 3/3/2022    Procedure: BILATERAL LATERAL BROWPEXY;  Surgeon: Edu Power MD;  [place]  [facility] [place] [facility] [place]  Service: Ophthalmology;  Laterality: Bilateral;   • CARDIAC CATHETERIZATION Bilateral 03/19/2012    Normal coronary arteries, severe mitral regurgitation and aortic regurgitation. EF was about 30-35%.; DR. JYOTI ROMERO AT [facility]   • CARDIAC ELECTROPHYSIOLOGY PROCEDURE Left 7/11/2016    Procedure: PPM battery change [employer];  Surgeon: Hunter Faust MD;  Location: [facility] CATH INVASIVE LOCATION;  Service:    • CATARACT EXTRACTION Bilateral    • COLONOSCOPY N/A 10/14/2014    PANDIVERTICULOSIS IN ENTIRE COLON, DR. BRANDEN RUIZ AT [facility]   • COLONOSCOPY N/A 11/13/2019    SCATTERED SMALL AND LARGE DIVERTICULA IN ENTIRE COLON, RESCOPE IN 10 YRS, DR. PERCY MUHAMMAD AT [facility]   • ENDOSCOPY N/A 06/06/2012    CHAD-RAMOS TEAR WITH ACTIVE BLEEDINGBLOOD THROUGHOUT STOMACH, BLOOD IN DUODENUM, DR. TARUN GILLIS AT [facility]   • ESOPHAGUS SURGERY      TEAR DURING HEART SURGERY, AT BOTTOM OF ESOPHAGUS   • HYSTERECTOMY N/A 1980    OLIVIA WITH BSO   • IMPLANTABLE CARDIOVERTER DEFIBRILLATOR LEAD REPLACEMENT/POCKET REVISION N/A 08/28/2012    DR. GISELLE BOOGIE AT [facility]   • MITRAL VALVE REPAIR/REPLACEMENT N/A 06/06/2012    REMODELING  ANULOPLASTY USING 24 MM BARAJAS ANULOPLASTY RING, MODEL 5200, SERIAL # [id number], DR. VINCE ZAMUDIO AT [facility]   • PATENT FORAMEN OVALE CLOSURE N/A 06/06/2012    DR. VINCE ZAMUDIO AT [facility]   • TONSILLECTOMY Bilateral    • TRICUSPID VALVE SURGERY N/A 06/06/2012    REMODELING ANULOPLASTY USING 26 MM BARAJAS ANULOPLASTY RING, MODEL 6200, SERIAL # [id number], DR. VINCE ZAMUDIO AT [facility]   • VAGINAL DELIVERY N/A 1974                        PT  Assessment/Plan     Row Name 03/22/23 1400          PT Assessment    Assessment Comments Pt returns for first f/u visit. Brought night wrist split to assess, discussed use with pt. Okay to use this night split unless pt prefers another one. Discussed sleeping posture today with pillow under arm to avoid wrist extension, and support under neck to avoiding needing to put hand under head. Progress wrist mobility and hand strength challenges.  -CC        PT Plan    PT Plan Comments Assess response to HEP, anticipate 1-2 more sessions for wrist then re-eval for LBP (same referral)  -CC           User Key  (r) = Recorded By, (t) = Taken By, (c) = Cosigned By    Initials Name Provider Type    CC Gianna Muhammad, PT Physical Therapist                   OP Exercises     Row Name 03/22/23 0900             Subjective Comments    Subjective Comments Numbness still  -CC         Subjective Pain    Able to rate subjective pain? yes  -CC      Pre-Treatment Pain Level 2  -CC         Total Minutes    40128 - PT Therapeutic Exercise Minutes 30  -CC      38612 - PT Therapeutic Activity Minutes 10  -CC         Exercise 1    Exercise Name 1 wrist circumduction  -CC      Cueing 1 Verbal  -CC      Reps 1 10 ea way  -CC         Exercise 2    Exercise Name 2 wrist flex/ext  -CC      Cueing 2 Verbal  -CC      Reps 2 10 ea way  -CC      Time 2 table supported  -CC         Exercise 3    Exercise Name 3 wrist ulnar/radial deviation  -CC      Cueing 3 Verbal  -CC      Reps 3 10 ea way  -CC      Time 3 table supportd  -CC         Exercise 4    Exercise Name 4 shoulder rolls  -CC      Cueing 4 Verbal  -CC      Reps 4 10  -CC         Exercise 5    Exercise Name 5 scap retract  -CC      Cueing 5 Verbal  -CC      Reps 5 10  -CC         Exercise 6    Exercise Name 6 discussed sleeping posture, pillow under arm, pillow with roll to support neck to avoid needing to put hand under neck  -CC         Exercise 7    Exercise Name 7 wrist tendon glides   -CC      Cueing 7 Verbal  -CC      Reps 7 10  -CC         Exercise 8    Exercise Name 8  (white gripper)  -CC      Cueing 8 Verbal  -CC      Reps 8 10  -CC      Time 8 5 sec  -CC         Exercise 9    Exercise Name 9 finger ext+abd  -CC      Cueing 9 Verbal  -CC      Reps 9 10  -CC      Time 9 half YTB looped  -CC            User Key  (r) = Recorded By, (t) = Taken By, (c) = Cosigned By    Initials Name Provider Type    CC Gianna Muhammad, PT Physical Therapist                                                Time Calculation:   Start Time: 0917  Stop Time: 0957  Time Calculation (min): 40 min  Total Timed Code Minutes- PT: 40 minute(s)  Timed Charges  24557 - PT Therapeutic Exercise Minutes: 30  54491 - PT Therapeutic Activity Minutes: 10  Total Minutes  Timed Charges Total Minutes: 40   Total Minutes: 40  Therapy Charges for Today     Code Description Service Date Service Provider Modifiers Qty    94354570092  PT THER PROC EA 15 MIN 3/22/2023 Gianna Muhammad, PT GP 2    [id number]  PT THERAPEUTIC ACT EA 15 MIN 3/22/2023 Gianna Muhammad, PT GP 1                    Gianna Muhammad, PT  3/22/2023

## 2023-04-04 ENCOUNTER — TELEPHONE (OUTPATIENT)
Dept: CARDIOLOGY | Facility: CLINIC | Age: 78
End: 2023-04-04
Payer: MEDICARE

## 2023-04-04 NOTE — TELEPHONE ENCOUNTER
Former Dr. Read patient.    Per hospital discharge summary (2/9/23) patient was to be scheduled with cardiology.  Patient requested Dr. Randall.  Patient would also need device check, MDT.

## 2023-04-17 ENCOUNTER — HOSPITAL ENCOUNTER (OUTPATIENT)
Dept: PHYSICAL THERAPY | Facility: HOSPITAL | Age: 78
Setting detail: THERAPIES SERIES
Discharge: HOME | End: 2023-04-17
Payer: MEDICARE

## 2023-04-17 DIAGNOSIS — G56.21 NEUROPATHY OF RIGHT ULNAR NERVE AT WRIST: Primary | ICD-10-CM

## 2023-04-17 DIAGNOSIS — M25.631 DECREASED RANGE OF MOTION OF RIGHT WRIST: ICD-10-CM

## 2023-04-17 DIAGNOSIS — M62.81 MUSCLE WEAKNESS: ICD-10-CM

## 2023-04-17 PROCEDURE — 97110 THERAPEUTIC EXERCISES: CPT

## 2023-04-17 NOTE — THERAPY PROGRESS REPORT/RE-CERT
Outpatient Physical Therapy Ortho Progress Note  [facility]     Patient Name: Olivia Addison  : 1945  MRN: [id number]  Today's Date: 2023      Visit Date: 2023    Visit Dx:    ICD-10-CM ICD-9-CM   1. 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 Decreased range of motion of right wrist  M25.631 719.53   3. Muscle weakness  M62.81 728.87       Patient Active Problem List   Diagnosis   • H/O tricuspid valve repair   • Abdominal pain   • Abdominal pain, left lower quadrant   • Acute conjunctivitis   • Adjustment disorder with anxious mood   • Alcohol abuse   • Anxiety   • Attention deficit disorder   • Biventricular implantable cardioverter-defibrillator in situ   • Cardiomyopathy, nonischemic   • Diverticulitis of colon   • Diverticulosis of intestine   • Flank pain   • H/O Estrellita-Biswas syndrome   • Hematuria   • Hypercholesterolemia   • Low back pain   • AMADO (obstructive sleep apnea)   • Posttraumatic stress disorder   • PTSD (post-traumatic stress disorder)   • Urinary tract infection   • S/P MVR (mitral valve repair)   • Diverticulitis   • Class 1 obesity in adult   • LBBB (left bundle branch block)   • CKD (chronic kidney disease) stage 3, GFR 30-59 ml/min   • Weakness of right upper extremity   • Numbness and tingling in right hand   • Ulnar neuropathy at wrist, right        Past Medical History:   Diagnosis Date   • ADHD (attention deficit hyperactivity disorder)    • Adjustment disorder with anxiety    • Allergic rhinitis    • Anxiety and depression    • Arthritis    • Atherosclerosis    • Atrial fibrillation     HX   • Atrophic vaginitis    • Blepharitis of both eyes    • CAD (coronary artery disease)    • Cardiomyopathy     NON ISCHEMIC    • Cataract    • CHF (congestive heart failure)     CHRONIC SYSTOLIC   • CKD (chronic kidney disease) stage 3, GFR 30-59 ml/min    • DDD (degenerative disc disease), cervical    • Diaphoresis 2019    SEEN AT [facility]   •  Diverticulitis 09/02/2019    SEEN AT [facility] ER   • Diverticulitis 11/15/2016   • Diverticulitis 09/07/2014   • Diverticulitis    • Diverticulitis large intestine 05/23/2017   • Diverticulitis of colon 08/22/2019    ADMITTED TO [facility]   • Dry eye syndrome    • Dyspnea    • Dysuria 06/2019   • Esophageal injury     ESTRELLITA-RAMOS SYNDROME   • Fatigue    • Heart failure    • Hematuria 08/2019   • History of frequent urinary tract infections    • History of mitral valve repair    • History of sepsis     FROM UTI   • Hypercholesterolemia    • Hyperlipidemia    • Hypertension    • Kidney cysts 07/2017    FOLLOWED BY DR. 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 HARMAN CÁRDENAS   • Segmental and somatic dysfunction of cervical region    • Segmental and somatic dysfunction of thoracic region    • SOB (shortness of breath) on exertion     R/T HEART   • Vitamin D deficiency    • VT (ventricular tachycardia)         Past Surgical History:   Procedure Laterality Date   • APPENDECTOMY N/A    • BIVENTRICULLAR IMPLANTABLE CARDIOVERTER DEFIBRILLATOR PLACEMENT N/A 04/10/2012    [employer], BI-V, DR. MICKEY MORALES AT [facility]   • BLADDER SUSPENSION N/A    • BLEPHAROPLASTY Bilateral 3/3/2022    Procedure: BILATERAL UPPER LID BLEPHAROPLASTY;  Surgeon: Edu Power MD;  Location: [facility] OR [place];  Service: Ophthalmology;  Laterality: Bilateral;   • BLEPHAROPLASTY Bilateral 3/3/2022    Procedure: BILATERAL LOWER LID  BLEPHAROPLASTY;  Surgeon: Edu Power MD;  Location:  [facility];  Service: New Image;  Laterality: Bilateral;   • BREAST CYST ASPIRATION     • BREAST SURGERY Bilateral     REDUCTION MAMMAPLASTY   • BROW LIFT Bilateral 3/3/2022    Procedure: BILATERAL LATERAL BROWPEXY;  Surgeon: Edu Power MD;  Location:  [facility];  Service: Ophthalmology;  Laterality: Bilateral;   • CARDIAC CATHETERIZATION Bilateral 03/19/2012    Normal coronary arteries, severe mitral regurgitation and aortic regurgitation. EF was about 30-35%.; DR. JYOTI ROMERO AT [facility]   • CARDIAC ELECTROPHYSIOLOGY PROCEDURE Left 7/11/2016    Procedure: PPM battery change [employer];  Surgeon: Hunter Faust MD;  Location: [facility] CATH INVASIVE LOCATION;  Service:    • CATARACT EXTRACTION Bilateral    • COLONOSCOPY N/A 10/14/2014    PANDIVERTICULOSIS IN ENTIRE COLON, DR. BRANDEN RUIZ AT [facility]   • COLONOSCOPY N/A 11/13/2019    SCATTERED SMALL AND LARGE DIVERTICULA IN ENTIRE COLON, RESCOPE IN 10 YRS, DR. PERCY MUHAMMAD AT [facility]   • ENDOSCOPY N/A 06/06/2012    CHAD-RAMOS TEAR WITH ACTIVE BLEEDINGBLOOD THROUGHOUT STOMACH, BLOOD IN DUODENUM, DR. TARUN GILLIS AT [facility]   • ESOPHAGUS SURGERY      TEAR DURING HEART SURGERY, AT BOTTOM OF ESOPHAGUS   • HYSTERECTOMY N/A 1980    OLIVIA WITH BSO   • IMPLANTABLE CARDIOVERTER DEFIBRILLATOR LEAD REPLACEMENT/POCKET REVISION N/A 08/28/2012    DR. GISELLE BOOGIE AT [facility]   • MITRAL VALVE REPAIR/REPLACEMENT N/A 06/06/2012    REMODELING  ANULOPLASTY USING 24 MM BARAJAS ANULOPLASTY RING, MODEL 5200, SERIAL # [id number], DR. VINCE ZAMUDIO AT [facility]   • PATENT FORAMEN OVALE CLOSURE N/A 06/06/2012    DR. VINCE ZAMUDIO AT [facility]   • TONSILLECTOMY Bilateral    • TRICUSPID VALVE SURGERY N/A 06/06/2012    REMODELING ANULOPLASTY USING 26 MM BARAJAS ANULOPLASTY RING, MODEL 6200, SERIAL # [id number], DR. VINCE ZAMUDIO AT [facility]   • VAGINAL DELIVERY N/A 1974        PT Ortho     Row Name 04/17/23 1100        Cervical/Shoulder ROM Screen    Cervical quadrant (Spurling's) Normal  distraction (-)  -          User Key  (r) = Recorded By, (t) = Taken By, (c) = Cosigned By    Initials Name Provider Type     Lin Brown, PT Physical Therapist                             PT Assessment/Plan     Row Name 04/17/23 1100          PT Assessment    Functional Limitations Limitation in home management;Limitations in community activities;Limitations in functional capacity and performance;Performance in leisure activities;Performance in self-care ADL;Performance in work activities  -     Impairments Sensation;Range of motion;Posture;Poor body mechanics;Pain;Muscle strength;Joint mobility;Impaired flexibility  -     Assessment Comments Olivia Addison has been seen for 3 physical therapy sessions (including evaluation) for ulnar nerve neuropathy at wrist. Treatment has included therapeutic exercise, therapeutic activity and patient education with home exercise program . Progress to physical therapy goals is slow as pt. Has met 1/2 STGs, and 1/4 LTGs. She reports no noticeable change in symptoms to date despite compliance with HEP. She intermittently wears wrist brace at night, paresthesias remains primarily in 4th and 5th digit, some into 3rd. She has referral for sciatica and would like to transition to that at next appointment per minimal progress with wrist thus far. Did discuss consistency with use of night splint and potential benefit from PT from certified hand therapist. She will benefit from continued skilled physical therapy to address remaining impairments and functional limitations.  -     Please refer to paper survey for additional self-reported information No  -MH     Rehab Potential Fair  -     Patient/caregiver participated in establishment of treatment plan and goals Yes  -     Patient would benefit from skilled therapy intervention Yes  -        PT Plan    PT Frequency 1x/week;2x/week  -      Predicted Duration of Therapy Intervention (PT) 6 visits  -     Planned CPT's? PT RE-EVAL: 48508;PT THER PROC EA 15 MIN: 54742;PT THER ACT EA 15 MIN: 76539;PT MANUAL THERAPY EA 15 MIN: 54887;PT NEUROMUSC RE-EDUCATION EA 15 MIN: 94212;PT GAIT TRAINING EA 15 MIN: 98396;PT SELF CARE/HOME MGMT/TRAIN EA 15: 45963;PT HOT OR COLD PACK TREAT [facility];PT TRACTION LUMBAR: 75576  -     PT Plan Comments eval LBP  -           User Key  (r) = Recorded By, (t) = Taken By, (c) = Cosigned By    Initials Name Provider Type     Lin Brown, PT Physical Therapist                   OP Exercises     Row Name 04/17/23 1000             Subjective Comments    Subjective Comments I am exercising it all the time and still no change  -         Total Minutes    43807 - PT Therapeutic Exercise Minutes 28  -MH         Exercise 1    Exercise Name 1 wrist circumduction  -      Cueing 1 Verbal  -      Reps 1 10 ea way  -         Exercise 5    Exercise Name 5 prayer stretch  -MH      Cueing 5 Verbal;Demo  -MH      Reps 5 3  -MH      Time 5 20sec  -MH         Exercise 6    Exercise Name 6 UBE  -MH      Cueing 6 Verbal  -MH      Time 6 4 min  -MH         Exercise 7    Exercise Name 7 wrist tendon glides  -      Cueing 7 Verbal  -MH      Reps 7 10ea  -MH         Exercise 8    Exercise Name 8  (white gripper)  -MH      Cueing 8 Verbal  -MH      Reps 8 15  -MH      Time 8 5sec  -MH         Exercise 10    Exercise Name 10 wrist flexion stretch  -MH      Cueing 10 Verbal;Demo  -MH      Reps 10 20sec  -            User Key  (r) = Recorded By, (t) = Taken By, (c) = Cosigned By    Initials Name Provider Type     Lin Brown, PT Physical Therapist                              PT OP Goals     Row Name 04/17/23 1100          PT Short Term Goals    STG Date to Achieve 04/14/23  -     STG 1 Pt will be independent and verbalized good understanding of initial HEP to improve ability to manage pain, as well as improve strength and ROM.   -     STG 1 Progress Met  -     STG 2 Pt will report compliance with night wrist splint to reduce prolonged positions contributing to nerve irritation.  -     STG 2 Progress Partially Met  -     STG 2 Progress Comments intermittently wearing at night  -        Long Term Goals    LTG Date to Achieve 05/14/23  -     LTG 1 Pt will be independent and verbalized good understanding of advanced HEP to improve ability to manage pain, as well as improve strength and ROM.  -     LTG 1 Progress Met  -     LTG 1 Progress Comments reports compliance  -     LTG 2 Pt will improve DASH score to at least </=15% perceived disability to show overall improved quality of life.  -     LTG 2 Progress Ongoing  -     LTG 3 Pt will improve R wrist strength to at least>/=4/5 MMT to improve participation in ADLs.  -     LTG 3 Progress Ongoing  -     LTG 4 Pt will improve R  strength to at least >32 lbs to reach age based norms and improve  -     LTG 4 Progress Ongoing  -           User Key  (r) = Recorded By, (t) = Taken By, (c) = Cosigned By    Initials Name Provider Type     Lin Brown, PT Physical Therapist                Therapy Education  Education Details: POC, will transition to LBP next session  Given: Symptoms/condition management, Posture/body mechanics  Program: Reinforced  How Provided: Verbal, Demonstration  Provided to: Patient  Level of Understanding: Verbalized, Demonstrated              Time Calculation:   Start Time: 1052  Stop Time: 1120  Time Calculation (min): 28 min  Total Timed Code Minutes- PT: 28 minute(s)  Timed Charges  49802 - PT Therapeutic Exercise Minutes: 28  Total Minutes  Timed Charges Total Minutes: 28   Total Minutes: 28  Therapy Charges for Today     Code Description Service Date Service Provider Modifiers Qty    [id number] HC PT THER PROC EA 15 MIN 4/17/2023 Lin Brown, PT GP 2                    Lin Brown PT  4/17/2023

## 2023-04-24 ENCOUNTER — HOSPITAL ENCOUNTER (OUTPATIENT)
Dept: PHYSICAL THERAPY | Facility: HOSPITAL | Age: 78
Setting detail: THERAPIES SERIES
Discharge: HOME | End: 2023-04-24
Payer: MEDICARE

## 2023-04-24 DIAGNOSIS — M25.631 DECREASED RANGE OF MOTION OF RIGHT WRIST: ICD-10-CM

## 2023-04-24 DIAGNOSIS — M62.81 MUSCLE WEAKNESS: ICD-10-CM

## 2023-04-24 DIAGNOSIS — M54.16 LUMBAR RADICULOPATHY, RIGHT: ICD-10-CM

## 2023-04-24 DIAGNOSIS — G56.21 NEUROPATHY OF RIGHT ULNAR NERVE AT WRIST: Primary | ICD-10-CM

## 2023-04-24 PROCEDURE — 97110 THERAPEUTIC EXERCISES: CPT

## 2023-04-24 PROCEDURE — 97164 PT RE-EVAL EST PLAN CARE: CPT

## 2023-04-24 NOTE — THERAPY DISCHARGE NOTE
Outpatient Physical Therapy Ortho Re-Evaluation/Discharge  [facility]     Patient Name: Olivia Addison  : 1945  MRN: [id number]  Today's Date: 2023      Visit Date: 2023    Patient Active Problem List   Diagnosis   • H/O tricuspid valve repair   • Abdominal pain   • Abdominal pain, left lower quadrant   • Acute conjunctivitis   • Adjustment disorder with anxious mood   • Alcohol abuse   • Anxiety   • Attention deficit disorder   • Biventricular implantable cardioverter-defibrillator in situ   • Cardiomyopathy, nonischemic   • Diverticulitis of colon   • Diverticulosis of intestine   • Flank pain   • H/O Estrellita-Biswas syndrome   • Hematuria   • Hypercholesterolemia   • Low back pain   • AMADO (obstructive sleep apnea)   • Posttraumatic stress disorder   • PTSD (post-traumatic stress disorder)   • Urinary tract infection   • S/P MVR (mitral valve repair)   • Diverticulitis   • Class 1 obesity in adult   • LBBB (left bundle branch block)   • CKD (chronic kidney disease) stage 3, GFR 30-59 ml/min   • Weakness of right upper extremity   • Numbness and tingling in right hand   • Ulnar neuropathy at wrist, right        Past Medical History:   Diagnosis Date   • ADHD (attention deficit hyperactivity disorder)    • Adjustment disorder with anxiety    • Allergic rhinitis    • Anxiety and depression    • Arthritis    • Atherosclerosis    • Atrial fibrillation     HX   • Atrophic vaginitis    • Blepharitis of both eyes    • CAD (coronary artery disease)    • Cardiomyopathy     NON ISCHEMIC    • Cataract    • CHF (congestive heart failure)     CHRONIC SYSTOLIC   • CKD (chronic kidney disease) stage 3, GFR 30-59 ml/min    • DDD (degenerative disc disease), cervical    • Diaphoresis 2019    SEEN AT [facility]   • Diverticulitis 2019    SEEN AT [facility] ER   • Diverticulitis 11/15/2016   • Diverticulitis 2014   • Diverticulitis    • Diverticulitis large intestine 2017   • Diverticulitis of  colon 08/22/2019    ADMITTED TO [facility]   • Dry eye syndrome    • Dyspnea    • Dysuria 06/2019   • Esophageal injury     ESTRELLITA-RAMOS SYNDROME   • Fatigue    • Heart failure    • Hematuria 08/2019   • History of frequent urinary tract infections    • History of mitral valve repair    • History of sepsis     FROM UTI   • Hypercholesterolemia    • Hyperlipidemia    • Hypertension    • Kidney cysts 07/2017    FOLLOWED BY DR. 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 HARMAN CÁRDENAS   • Segmental and somatic dysfunction of cervical region    • Segmental and somatic dysfunction of thoracic region    • SOB (shortness of breath) on exertion     R/T HEART   • Vitamin D deficiency    • VT (ventricular tachycardia)         Past Surgical History:   Procedure Laterality Date   • APPENDECTOMY N/A    • BIVENTRICULLAR IMPLANTABLE CARDIOVERTER DEFIBRILLATOR PLACEMENT N/A 04/10/2012    [employer], BI-V, DR. MICKEY MORALES AT [facility]   • BLADDER SUSPENSION N/A    • BLEPHAROPLASTY Bilateral 3/3/2022    Procedure: BILATERAL UPPER LID BLEPHAROPLASTY;  Surgeon: Edu Power MD;  [place] [facility] [place] [facility] [place]  Service: Ophthalmology;  Laterality: Bilateral;   • BLEPHAROPLASTY Bilateral 3/3/2022    Procedure: BILATERAL LOWER LID BLEPHAROPLASTY;  Surgeon: Edu Power MD;  [place] [facility] [place] [facility] [place]  Service: New Image;  Laterality: Bilateral;   • BREAST CYST ASPIRATION     • BREAST SURGERY Bilateral     REDUCTION  MAMMAPLASTY   • BROW LIFT Bilateral 3/3/2022    Procedure: BILATERAL LATERAL BROWPEXY;  Surgeon: Edu Power MD;  Location: [facility] OR [facility] [place];  Service: Ophthalmology;  Laterality: Bilateral;   • CARDIAC CATHETERIZATION Bilateral 03/19/2012    Normal coronary arteries, severe mitral regurgitation and aortic regurgitation. EF was about 30-35%.; DR. JYOTI ROMERO AT [facility]   • CARDIAC ELECTROPHYSIOLOGY PROCEDURE Left 7/11/2016    Procedure: PPM battery change [employer]TR[employer];  Surgeon: Hunter Faust MD;  Location: [facility] CATH INVASIVE LOCATION;  Service:    • CATARACT EXTRACTION Bilateral    • COLONOSCOPY N/A 10/14/2014    PANDIVERTICULOSIS IN ENTIRE COLON, DR. BRANDEN RUZI AT [facility]   • COLONOSCOPY N/A 11/13/2019    SCATTERED SMALL AND LARGE DIVERTICULA IN ENTIRE COLON, RESCOPE IN 10 YRS, DR. PERCY MUHAMMAD AT [facility]   • ENDOSCOPY N/A 06/06/2012    CHAD-RAMOS TEAR WITH ACTIVE BLEEDINGBLOOD THROUGHOUT STOMACH, BLOOD IN DUODENUM, DR. TARUN GILLIS AT [facility]   • ESOPHAGUS SURGERY      TEAR DURING HEART SURGERY, AT BOTTOM OF ESOPHAGUS   • HYSTERECTOMY N/A 1980    OLIVIA WITH BSO   • IMPLANTABLE CARDIOVERTER DEFIBRILLATOR LEAD REPLACEMENT/POCKET REVISION N/A 08/28/2012    DR. GISELLE BOOGIE AT [facility]   • MITRAL VALVE REPAIR/REPLACEMENT N/A 06/06/2012    REMODELING  ANULOPLASTY USING 24 MM BARAJAS ANULOPLASTY RING, MODEL 5200, SERIAL # [id number], DR. VINCE ZAMUDIO AT [facility]   • PATENT FORAMEN OVALE CLOSURE N/A 06/06/2012    DR. VINCE ZAMUDIO AT [facility]   • TONSILLECTOMY Bilateral    • TRICUSPID VALVE SURGERY N/A 06/06/2012    REMODELING ANULOPLASTY USING 26 MM BARAJAS ANULOPLASTY RING, MODEL 6200, SERIAL # [id number], DR. VINCE ZAMUDIO AT [facility]   • VAGINAL DELIVERY N/A 1974         Visit Dx:     ICD-10-CM ICD-9-CM   1. 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 Decreased range of motion of right wrist  M25.631 719.53   3. Muscle weakness  M62.81 728.87   4. Lumbar radiculopathy, right  M54.16 724.4         Patient History     Row Name 04/24/23 1100             History    Chief Complaint Pain (P)   -CS      Type of Pain Back pain (P)   -CS      Brief Description of Current Complaint Olivia states she was diagnosed with sciatica down R leg by Dr. Mccrary about a year ago. Dr. Mccrary wanted her to do therapy but she hasn't been to therapy for her back pain. Sudden onset, on and off for about 5 years. Her pain starts in the back and works its way down the leg into the front of R foot. Worst pain 6/10 that she cannot relate to any specific position. She cannot think of anything that relieves her pain but states stretching makes it feel marianne. When her symptoms start she has continuous pain and no change throughout day. She lives on second floor of [facility] which requires two flights of stairs which can make her symptoms worse. When she has to do laundry, there are three flights of stairs. She states that her pain wakes her up in the middle of the night, every night for about a month, then her symptoms are gone. She feels like it will never get better. Pain always travels down leg but the pain in her back is the worst. Random onsets during flare ups that are not related to any specific movements. Pt states sitting does not seem to be any better than standing in terms of relieving pain. (P)   -CS      Occupation/sports/leisure activities Retired teacher (P)   -CS         Pain     Pain at Present 0 (P)   -CS      Pain at Best 0 (P)   -CS      Pain at Worst 6 (P)   -CS      Pain Description Tender;Tightness;Numbness (P)   -CS      What Performance Factors Make the Current Problem(s) WORSE? Stairs, sitting, standing, ADLs (P)   -CS      What Performance Factors Make the Current Problem(s) BETTER? Stretching (P)   -CS      Is your sleep disturbed? Yes (P)   -CS      Difficulties with ADL's? Everything I do (P)   -CS      Difficulties with recreational activities? Walking dog (P)   -CS         Fall Risk Assessment    Any falls in the  past year: No (P)   -CS         Services    Prior Rehab/Home Health Experiences No (P)   -CS      Are you currently receiving Home Health services No (P)   -CS      Do you plan to receive Home Health services in the near future No (P)   -CS         Daily Activities    Primary Language English (P)   -CS      Are you able to read Yes (P)   -CS      Are you able to write Yes (P)   -CS      How does patient learn best? Listening;Reading;Demonstration;Pictures/Video (P)   -CS      Barriers to learning None (P)   -CS      Pt Participated in POC and Goals Yes (P)   -CS         Safety    Are you being hurt, hit, or frightened by anyone at home or in your life? No (P)   -CS      Are you being neglected by a caregiver No (P)   -CS            User Key  (r) = Recorded By, (t) = Taken By, (c) = Cosigned By    Initials Name Provider Type    CS Eric Britton PT Student PT Student                 PT Ortho     Row Name 04/24/23 1300       Posture/Observations    Alignment Options Iliac crests (P)   -CS    Iliac crests Right:;Mild (P)   R iliac crest lower than L  -CS       Sensory Screen for Light Touch- Lower Quarter Clearing    L2 (anterior mid thigh) Bilateral:;Intact (P)   -CS    L3 (distal anterior thigh) Bilateral:;Intact (P)   -CS    L4 (medial lower leg/foot) Bilateral:;Intact (P)   -CS       Myotomal Screen- Lower Quarter Clearing    Hip flexion (L2) 4 (Good);Bilateral: (P)   -CS    Knee extension (L3) Bilateral:;5 (Normal) (P)   -CS    Ankle DF (L4) Bilateral:;5 (Normal) (P)   -CS    Great toe extension (L5) Bilateral:;5 (Normal) (P)   -CS    Ankle PF (S1) Bilateral:;5 (Normal) (P)   -CS    Knee flexion (S2) Bilateral:;5 (Normal) (P)   -CS       Lumbar ROM Screen- Lower Quarter Clearing    Lumbar Flexion Normal (P)   -CS    Lumbar Extension Normal (P)   -CS    Lumbar Lateral Flexion Impaired (P)   Limited but not painful  -CS       SI/Hip Screen- Lower Quarter Clearing    ASIS compression Right:;Positive (P)   -CS    ASIS  distraction Right:;Positive (P)   -CS    Tomasa's/David's test Right:;Positive (P)   -CS    Posterior thigh sheer Bilateral:;Negative (P)   -CS       Special Tests/Palpation    Special Tests/Palpation Lumbar/SI (P)   -CS       Lumbar/SI Special Tests    SI Compression Test (SI Dysfunction) Right:;Positive (P)   -CS    Thigh Thrust/Posterior Shear (SI Dysfunction) Bilateral:;Negative (P)   -CS    TOMASA (hip vs. SI Dysfunction) Right:;Positive (P)   -CS       Lumbosacral Palpation    Lumbosacral Palpation? Yes (P)   -CS    SI Tender;Guarded/taut (P)   -CS    Lumbosacral Segment Right:;Trigger point;Tender (P)   -CS       MMT Right Lower Ext    Rt Hip Extension MMT, Gross Movement (3+/5) fair plus (P)   -CS    Rt Hip ABduction MMT, Gross Movement (3-/5) fair minus (P)   -CS       MMT Left Lower Ext    Lt Hip Extension MMT, Gross Movement (3+/5) fair plus (P)   -CS    Lt Hip ABduction MMT, Gross Movement (3-/5) fair minus (P)   -CS       Sensation    Sensation WNL? WNL (P)   -CS    Light Touch No apparent deficits (P)   -CS          User Key  (r) = Recorded By, (t) = Taken By, (c) = Cosigned By    Initials Name Provider Type    CS Eric Britton, PT Student PT Student                             Therapy Education  Given: (P) HEP, Symptoms/condition management, Pain management, Posture/body mechanics  Program: (P) New  How Provided: (P) Verbal, Written, Demonstration  Provided to: (P) Patient  Level of Understanding: (P) Teach back education performed, Verbalized, Demonstrated     PT OP Goals     Row Name 04/24/23 1300          PT Short Term Goals    STG Date to Achieve 04/14/23 (P)   -CS     STG 1 Pt will be independent and verbalized good understanding of initial HEP to improve ability to manage pain, as well as improve strength and ROM. (P)   -CS     STG 1 Progress Met (P)   -CS     STG 2 Pt will report compliance with night wrist splint to reduce prolonged positions contributing to nerve irritation. (P)   -CS     STG  2 Progress Partially Met (P)   -CS        Long Term Goals    LTG Date to Achieve 05/14/23 (P)   -CS     LTG 1 Pt will be independent and verbalized good understanding of advanced HEP to improve ability to manage pain, as well as improve strength and ROM. (P)   -CS     LTG 1 Progress Met (P)   -CS     LTG 2 Pt will improve DASH score to at least </=15% perceived disability to show overall improved quality of life. (P)   -CS     LTG 2 Progress Not Met (P)   -CS     LTG 3 Pt will improve R wrist strength to at least>/=4/5 MMT to improve participation in ADLs. (P)   -CS     LTG 3 Progress Not Met (P)   -CS     LTG 4 Pt will improve R  strength to at least >32 lbs to reach age based norms and improve (P)   -CS     LTG 4 Progress Not Met (P)   -CS     LTG 5 Pt will demonstrate understanding of etiology of LBP in order to reduce likelihood of recurrence of symptoms (P)   -CS     LTG 5 Progress Met (P)   -CS     LTG 6 Pt will demonstrate correct TA engagement to reduce stress through R SI and improve ability to perform functional movement patterns (P)   -CS     LTG 6 Progress Met (P)   -CS           User Key  (r) = Recorded By, (t) = Taken By, (c) = Cosigned By    Initials Name Provider Type    Eric Zheng, PT Student PT Student                 PT Assessment/Plan     Row Name 04/24/23 7857          PT Assessment    Functional Limitations Limitation in home management;Performance in self-care ADL (P)   -CS     Impairments Joint mobility;Muscle strength;Posture;Range of motion;Pain (P)   -CS     Assessment Comments Olivia Addison is a 77 y.o. year-old female referred to physical therapy for R sided LBP. She has comorbidities of chronicity of symptoms and personal factors of poor body awareness that may affect her progress in the plan of care. She demonstrated positive R SI special testing, tenderness of R lumbosacral segment, R anterior innominate rotation, decrease hip abd/ex/flex strength. Signs and symptoms are  consistent with referring diagnosis. Upon referral pt c/o LBP which has since resolved. Proceeded with assessment of LBP to reduce likelihood of recurrence of symptoms. Given HEP for improved TA/glut activation and educated on anatomy/etiology of symptoms. Pt D/C to I management and advised to return to PT if symptoms reccur. (P)   -CS     Please refer to paper survey for additional self-reported information No (P)   -CS     Patient/caregiver participated in establishment of treatment plan and goals Yes (P)   -CS     Patient would benefit from skilled therapy intervention No (P)   Pt denies pain for past 3 weeks, given HEP for I management  -CS        PT Plan    Planned CPT's? PT RE-EVAL: 83848;PT THER PROC EA 15 MIN: 21722;PT THER ACT EA 15 MIN: 76031;PT MANUAL THERAPY EA 15 MIN: 22772;PT NEUROMUSC RE-EDUCATION EA 15 MIN: 23906;PT GAIT TRAINING EA 15 MIN: 90750;PT SELF CARE/HOME MGMT/TRAIN EA 15: 08553;PT AQUATIC THERAPY EA 15 MIN: 01766;PT HOT OR COLD PACK TREAT [facility];PT ELECTRICAL STIM UNATTEND:  (P)   -CS     PT Plan Comments D/C (P)   -CS           User Key  (r) = Recorded By, (t) = Taken By, (c) = Cosigned By    Initials Name Provider Type    Eric Zheng, PT Student PT Student                 OP Exercises     Row Name 04/24/23 1300             Total Minutes    22939 - PT Therapeutic Exercise Minutes 10 (P)   -CS         Exercise 11    Exercise Name 11 LTR (P)   -CS      Cueing 11 Verbal;Tactile (P)   -CS      Sets 11 1 (P)   -CS      Reps 11 10 (P)   -CS      Time 11 5sec (P)   -CS         Exercise 12    Exercise Name 12 PPT (P)   -CS      Cueing 12 Verbal;Tactile (P)   -CS      Reps 12 10 (P)   -CS      Time 12 3sec (P)   -CS      Additional Comments Cues to breath (P)   -CS         Exercise 13    Exercise Name 13 Glute bridge (P)   -CS      Cueing 13 Verbal;Tactile (P)   -CS      Sets 13 1 (P)   -CS      Reps 13 10 (P)   -CS      Additional Comments Cues to activate TA (P)   -CS         Exercise 14     Exercise Name 14 SL Clamshells (P)   -CS      Cueing 14 Verbal;Tactile (P)   -CS      Sets 14 1 (P)   -CS      Reps 14 10 (P)   -CS         Exercise 15    Exercise Name 15 R Muscle energy technique for posterior innominate rotation (P)   -CS      Cueing 15 Verbal;Tactile;Demo (P)   -CS      Reps 15 5 (P)   -CS      Time 15 5sec (P)   -CS            User Key  (r) = Recorded By, (t) = Taken By, (c) = Cosigned By    Initials Name Provider Type    CS Eric Britton, PT Student PT Student                                       Time Calculation:   Start Time: (P) 1138  Stop Time: (P) 1218  Time Calculation (min): (P) 40 min  Timed Charges  98467 - PT Therapeutic Exercise Minutes: (P) 10  Total Minutes  Timed Charges Total Minutes: (P) 10   Total Minutes: (P) 10  Therapy Charges for Today     Code Description Service Date Service Provider Modifiers Qty    [id number]  PT THER PROC EA 15 MIN 4/24/2023 Eric Britton, PT Student GP 1    [id number]  PT RE-EVAL ESTABLISHED PLAN 2 4/24/2023 Eric Britton, PT Student GP 1                OP PT Discharge Summary  Date of Discharge: (P) 04/24/23  Reason for Discharge: (P) Independent  Outcomes Achieved: (P) Patient able to partially acheive established goals  Discharge Destination: (P) Home with home program  Discharge Instructions/Additional Comments: (P) Pt demonstrates minimal progress with R wrist symptoms. Referral also placed for LBP which she no longer is experiencing. Pt given HEP and D/C to I management.        Eric Britton, PT Student  4/24/2023

## 2023-04-24 NOTE — THERAPY RE-EVALUATION
Outpatient Physical Therapy Ortho Re-Evaluation  [facility]     Patient Name: Olivia Addison  : 1945  MRN: [id number]  Today's Date: 2023      Visit Date: 2023    Patient Active Problem List   Diagnosis   • H/O tricuspid valve repair   • Abdominal pain   • Abdominal pain, left lower quadrant   • Acute conjunctivitis   • Adjustment disorder with anxious mood   • Alcohol abuse   • Anxiety   • Attention deficit disorder   • Biventricular implantable cardioverter-defibrillator in situ   • Cardiomyopathy, nonischemic   • Diverticulitis of colon   • Diverticulosis of intestine   • Flank pain   • H/O Estrellita-Biswas syndrome   • Hematuria   • Hypercholesterolemia   • Low back pain   • AMADO (obstructive sleep apnea)   • Posttraumatic stress disorder   • PTSD (post-traumatic stress disorder)   • Urinary tract infection   • S/P MVR (mitral valve repair)   • Diverticulitis   • Class 1 obesity in adult   • LBBB (left bundle branch block)   • CKD (chronic kidney disease) stage 3, GFR 30-59 ml/min   • Weakness of right upper extremity   • Numbness and tingling in right hand   • Ulnar neuropathy at wrist, right        Past Medical History:   Diagnosis Date   • ADHD (attention deficit hyperactivity disorder)    • Adjustment disorder with anxiety    • Allergic rhinitis    • Anxiety and depression    • Arthritis    • Atherosclerosis    • Atrial fibrillation     HX   • Atrophic vaginitis    • Blepharitis of both eyes    • CAD (coronary artery disease)    • Cardiomyopathy     NON ISCHEMIC    • Cataract    • CHF (congestive heart failure)     CHRONIC SYSTOLIC   • CKD (chronic kidney disease) stage 3, GFR 30-59 ml/min    • DDD (degenerative disc disease), cervical    • Diaphoresis 2019    SEEN AT [facility]   • Diverticulitis 2019    SEEN AT [facility] ER   • Diverticulitis 11/15/2016   • Diverticulitis 2014   • Diverticulitis    • Diverticulitis large intestine 2017   • Diverticulitis of colon  08/22/2019    ADMITTED TO [facility]   • Dry eye syndrome    • Dyspnea    • Dysuria 06/2019   • Esophageal injury     ESTRELLITA-RAMOS SYNDROME   • Fatigue    • Heart failure    • Hematuria 08/2019   • History of frequent urinary tract infections    • History of mitral valve repair    • History of sepsis     FROM UTI   • Hypercholesterolemia    • Hyperlipidemia    • Hypertension    • Kidney cysts 07/2017    FOLLOWED BY DR. 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 HARMAN CÁRDENAS   • Segmental and somatic dysfunction of cervical region    • Segmental and somatic dysfunction of thoracic region    • SOB (shortness of breath) on exertion     R/T HEART   • Vitamin D deficiency    • VT (ventricular tachycardia)         Past Surgical History:   Procedure Laterality Date   • APPENDECTOMY N/A    • BIVENTRICULLAR IMPLANTABLE CARDIOVERTER DEFIBRILLATOR PLACEMENT N/A 04/10/2012    [employer], BI-V, DR. MICKEY MORALES AT [facility]   • BLADDER SUSPENSION N/A    • BLEPHAROPLASTY Bilateral 3/3/2022    Procedure: BILATERAL UPPER LID BLEPHAROPLASTY;  Surgeon: Edu Power MD;  [facility] [facility] [place] [facility]  Service: Ophthalmology;  Laterality: Bilateral;   • BLEPHAROPLASTY Bilateral 3/3/2022    Procedure: BILATERAL LOWER LID BLEPHAROPLASTY;  Surgeon: Edu Power MD;  Location[place] [facility] [place] [facility]  Service: New Image;  Laterality: Bilateral;   • BREAST CYST ASPIRATION     • BREAST SURGERY Bilateral     REDUCTION MAMMAPLASTY    • BROW LIFT Bilateral 3/3/2022    Procedure: BILATERAL LATERAL BROWPEXY;  Surgeon: Edu Power MD;  [place]  [facility] [place] [facility];  Service: Ophthalmology;  Laterality: Bilateral;   • CARDIAC CATHETERIZATION Bilateral 03/19/2012    Normal coronary arteries, severe mitral regurgitation and aortic regurgitation. EF was about 30-35%.; DR. JYOTI ROMERO AT [facility]   • CARDIAC ELECTROPHYSIOLOGY PROCEDURE Left 7/11/2016    Procedure: PPM battery change [employer];  Surgeon: Hunter Faust MD;  Location: [facility] CATH INVASIVE LOCATION;  Service:    • CATARACT EXTRACTION Bilateral    • COLONOSCOPY N/A 10/14/2014    PANDIVERTICULOSIS IN ENTIRE COLON, DR. BRANDEN RUIZ AT [facility]   • COLONOSCOPY N/A 11/13/2019    SCATTERED SMALL AND LARGE DIVERTICULA IN ENTIRE COLON, RESCOPE IN 10 YRS, DR. PERCY MUHAMMAD AT [facility]   • ENDOSCOPY N/A 06/06/2012    CHAD-RAMOS TEAR WITH ACTIVE BLEEDINGBLOOD THROUGHOUT STOMACH, BLOOD IN DUODENUM, DR. TARUN GILLIS AT [facility]   • ESOPHAGUS SURGERY      TEAR DURING HEART SURGERY, AT BOTTOM OF ESOPHAGUS   • HYSTERECTOMY N/A 1980    OLIVIA WITH BSO   • IMPLANTABLE CARDIOVERTER DEFIBRILLATOR LEAD REPLACEMENT/POCKET REVISION N/A 08/28/2012    DR. GISELLE BOOGIE AT [facility]   • MITRAL VALVE REPAIR/REPLACEMENT N/A 06/06/2012    REMODELING  ANULOPLASTY USING 24 MM BARAJAS ANULOPLASTY RING, MODEL 5200, SERIAL # [id number], DR. VINCE ZAMUDIO AT [facility]   • PATENT FORAMEN OVALE CLOSURE N/A 06/06/2012    DR. VINCE ZAMUDIO AT [facility]   • TONSILLECTOMY Bilateral    • TRICUSPID VALVE SURGERY N/A 06/06/2012    REMODELING ANULOPLASTY USING 26 MM BARAJAS ANULOPLASTY RING, MODEL 6200, SERIAL # [id number], DR. VINCE ZAMUDIO AT [facility]   • VAGINAL DELIVERY N/A 1974       Visit Dx:     ICD-10-CM ICD-9-CM   1. 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 Decreased range of motion of right wrist  M25.631 719.53   3. Muscle weakness  M62.81 728.87   4. Lumbar radiculopathy, right  M54.16 724.4          Patient History      Row Name 04/24/23 1100             History    Chief Complaint Pain (P)   -CS      Type of Pain Back pain (P)   -CS      Brief Description of Current Complaint Olivia states she was diagnosed with sciatica down R leg by Dr. Mccrary about a year ago. Dr. Mccrary wanted her to do therapy but she hasn't been to therapy for her back pain. Sudden onset, on and off for about 5 years. Her pain starts in the back and works its way down the leg into the front of R foot. Worst pain 6/10 that she cannot relate to any specific position. She cannot think of anything that relieves her pain but states stretching makes it feel marianne. When her symptoms start she has continuous pain and no change throughout day. She lives on second floor of [facility] which requires two flights of stairs which can make her symptoms worse. When she has to do laundry, there are three flights of stairs. She states that her pain wakes her up in the middle of the night, every night for about a month, then her symptoms are gone. She feels like it will never get better. Pain always travels down leg but the pain in her back is the worst. Random onsets during flare ups that are not related to any specific movements. Pt states sitting does not seem to be any better than standing in terms of relieving pain. (P)   -CS      Occupation/sports/leisure activities Retired teacher (P)   -CS         Pain     Pain at Present 0 (P)   -CS      Pain at Best 0 (P)   -CS      Pain at Worst 6 (P)   -CS      Pain Description Tender;Tightness;Numbness (P)   -CS      What Performance Factors Make the Current Problem(s) WORSE? Stairs, sitting, standing, ADLs (P)   -CS      What Performance Factors Make the Current Problem(s) BETTER? Stretching (P)   -CS      Is your sleep disturbed? Yes (P)   -CS      Difficulties with ADL's? Everything I do (P)   -CS      Difficulties with recreational activities? Walking dog (P)   -CS         Fall Risk Assessment    Any falls in the past year: No (P)    -CS         Services    Prior Rehab/Home Health Experiences No (P)   -CS      Are you currently receiving Home Health services No (P)   -CS      Do you plan to receive Home Health services in the near future No (P)   -CS         Daily Activities    Primary Language English (P)   -CS      Are you able to read Yes (P)   -CS      Are you able to write Yes (P)   -CS      How does patient learn best? Listening;Reading;Demonstration;Pictures/Video (P)   -CS      Barriers to learning None (P)   -CS      Pt Participated in POC and Goals Yes (P)   -CS         Safety    Are you being hurt, hit, or frightened by anyone at home or in your life? No (P)   -CS      Are you being neglected by a caregiver No (P)   -CS            User Key  (r) = Recorded By, (t) = Taken By, (c) = Cosigned By    Initials Name Provider Type    CS Eric Britton, PT Student PT Student                 PT Ortho     Row Name 04/24/23 1300       Posture/Observations    Alignment Options Iliac crests (P)   -CS    Iliac crests Right:;Mild (P)   R iliac crest lower than L  -CS       Sensory Screen for Light Touch- Lower Quarter Clearing    L2 (anterior mid thigh) Bilateral:;Intact (P)   -CS    L3 (distal anterior thigh) Bilateral:;Intact (P)   -CS    L4 (medial lower leg/foot) Bilateral:;Intact (P)   -CS       Myotomal Screen- Lower Quarter Clearing    Hip flexion (L2) 4 (Good);Bilateral: (P)   -CS    Knee extension (L3) Bilateral:;5 (Normal) (P)   -CS    Ankle DF (L4) Bilateral:;5 (Normal) (P)   -CS    Great toe extension (L5) Bilateral:;5 (Normal) (P)   -CS    Ankle PF (S1) Bilateral:;5 (Normal) (P)   -CS    Knee flexion (S2) Bilateral:;5 (Normal) (P)   -CS       Lumbar ROM Screen- Lower Quarter Clearing    Lumbar Flexion Normal (P)   -CS    Lumbar Extension Normal (P)   -CS    Lumbar Lateral Flexion Impaired (P)   Limited but not painful  -CS       SI/Hip Screen- Lower Quarter Clearing    ASIS compression Right:;Positive (P)   -CS    ASIS distraction  Right:;Positive (P)   -CS    Tomasa's/David's test Right:;Positive (P)   -CS    Posterior thigh sheer Bilateral:;Negative (P)   -CS       Special Tests/Palpation    Special Tests/Palpation Lumbar/SI (P)   -CS       Lumbar/SI Special Tests    SI Compression Test (SI Dysfunction) Right:;Positive (P)   -CS    Thigh Thrust/Posterior Shear (SI Dysfunction) Bilateral:;Negative (P)   -CS    TOMASA (hip vs. SI Dysfunction) Right:;Positive (P)   -CS       Lumbosacral Palpation    Lumbosacral Palpation? Yes (P)   -CS    SI Tender;Guarded/taut (P)   -CS    Lumbosacral Segment Right:;Trigger point;Tender (P)   -CS       MMT Right Lower Ext    Rt Hip Extension MMT, Gross Movement (3+/5) fair plus (P)   -CS    Rt Hip ABduction MMT, Gross Movement (3-/5) fair minus (P)   -CS       MMT Left Lower Ext    Lt Hip Extension MMT, Gross Movement (3+/5) fair plus (P)   -CS    Lt Hip ABduction MMT, Gross Movement (3-/5) fair minus (P)   -CS       Sensation    Sensation WNL? WNL (P)   -CS    Light Touch No apparent deficits (P)   -CS          User Key  (r) = Recorded By, (t) = Taken By, (c) = Cosigned By    Initials Name Provider Type    Eric Zheng, PT Student PT Student                            Therapy Education  Given: (P) HEP, Symptoms/condition management, Pain management, Posture/body mechanics  Program: (P) New  How Provided: (P) Verbal, Written, Demonstration  Provided to: (P) Patient  Level of Understanding: (P) Teach back education performed, Verbalized, Demonstrated      PT OP Goals     Row Name 04/24/23 1300          PT Short Term Goals    STG Date to Achieve 04/14/23 (P)   -CS     STG 1 Pt will be independent and verbalized good understanding of initial HEP to improve ability to manage pain, as well as improve strength and ROM. (P)   -CS     STG 1 Progress Met (P)   -CS     STG 2 Pt will report compliance with night wrist splint to reduce prolonged positions contributing to nerve irritation. (P)   -CS     STG 2 Progress  Partially Met (P)   -CS        Long Term Goals    LTG Date to Achieve 05/14/23 (P)   -CS     LTG 1 Pt will be independent and verbalized good understanding of advanced HEP to improve ability to manage pain, as well as improve strength and ROM. (P)   -CS     LTG 1 Progress Met (P)   -CS     LTG 2 Pt will improve DASH score to at least </=15% perceived disability to show overall improved quality of life. (P)   -CS     LTG 2 Progress Not Met (P)   -CS     LTG 3 Pt will improve R wrist strength to at least>/=4/5 MMT to improve participation in ADLs. (P)   -CS     LTG 3 Progress Not Met (P)   -CS     LTG 4 Pt will improve R  strength to at least >32 lbs to reach age based norms and improve (P)   -CS     LTG 4 Progress Not Met (P)   -CS     LTG 5 Pt will demonstrate understanding of etiology of LBP in order to reduce likelihood of recurrence of symptoms (P)   -CS     LTG 5 Progress Met (P)   -CS     LTG 6 Pt will demonstrate correct TA engagement to reduce stress through R SI and improve ability to perform functional movement patterns (P)   -CS     LTG 6 Progress Met (P)   -CS           User Key  (r) = Recorded By, (t) = Taken By, (c) = Cosigned By    Initials Name Provider Type    Eric Zheng, PT Student PT Student                 PT Assessment/Plan     Row Name 04/24/23 9654          PT Assessment    Functional Limitations Limitation in home management;Performance in self-care ADL (P)   -CS     Impairments Joint mobility;Muscle strength;Posture;Range of motion;Pain (P)   -CS     Assessment Comments Olivia Addison is a 77 y.o. year-old female referred to physical therapy for R sided LBP. She has comorbidities of chronicity of symptoms and personal factors of poor body awareness that may affect her progress in the plan of care. She demonstrated positive R SI special testing, tenderness of R lumbosacral segment, R anterior innominate rotation, decrease hip abd/ex/flex strength. Signs and symptoms are consistent  with referring diagnosis. Upon referral pt c/o LBP which has since resolved. Proceeded with assessment of LBP to reduce likelihood of recurrence of symptoms. Given HEP for improved TA/glut activation and educated on anatomy/etiology of symptoms. Pt D/C to I management and advised to return to PT if symptoms reccur. (P)   -CS     Please refer to paper survey for additional self-reported information No (P)   -CS     Patient/caregiver participated in establishment of treatment plan and goals Yes (P)   -CS     Patient would benefit from skilled therapy intervention No (P)   Pt denies pain for past 3 weeks, given HEP for I management  -CS        PT Plan    Planned CPT's? PT RE-EVAL: 54701;PT THER PROC EA 15 MIN: 12696;PT THER ACT EA 15 MIN: 35765;PT MANUAL THERAPY EA 15 MIN: 03436;PT NEUROMUSC RE-EDUCATION EA 15 MIN: 47396;PT GAIT TRAINING EA 15 MIN: 91893;PT SELF CARE/HOME MGMT/TRAIN EA 15: 56048;PT AQUATIC THERAPY EA 15 MIN: 29290;PT HOT OR COLD PACK TREAT MCARE;PT ELECTRICAL STIM UNATTEND:  (P)   -CS     PT Plan Comments D/C (P)   -CS           User Key  (r) = Recorded By, (t) = Taken By, (c) = Cosigned By    Initials Name Provider Type    Eric Zheng, PT Student PT Student                   OP Exercises     Row Name 04/24/23 1300             Total Minutes    82114 - PT Therapeutic Exercise Minutes 10 (P)   -CS         Exercise 11    Exercise Name 11 LTR (P)   -CS      Cueing 11 Verbal;Tactile (P)   -CS      Sets 11 1 (P)   -CS      Reps 11 10 (P)   -CS      Time 11 5sec (P)   -CS         Exercise 12    Exercise Name 12 PPT (P)   -CS      Cueing 12 Verbal;Tactile (P)   -CS      Reps 12 10 (P)   -CS      Time 12 3sec (P)   -CS      Additional Comments Cues to breath (P)   -CS         Exercise 13    Exercise Name 13 Glute bridge (P)   -CS      Cueing 13 Verbal;Tactile (P)   -CS      Sets 13 1 (P)   -CS      Reps 13 10 (P)   -CS      Additional Comments Cues to activate TA (P)   -CS         Exercise 14     Exercise Name 14 SL Clamshells (P)   -CS      Cueing 14 Verbal;Tactile (P)   -CS      Sets 14 1 (P)   -CS      Reps 14 10 (P)   -CS         Exercise 15    Exercise Name 15 R Muscle energy technique for posterior innominate rotation (P)   -CS      Cueing 15 Verbal;Tactile;Demo (P)   -CS      Reps 15 5 (P)   -CS      Time 15 5sec (P)   -CS            User Key  (r) = Recorded By, (t) = Taken By, (c) = Cosigned By    Initials Name Provider Type    CS Eric Britton, PT Student PT Student                                        Time Calculation:     Start Time: (P) 1138  Stop Time: (P) 1218  Time Calculation (min): (P) 40 min  Timed Charges  53728 - PT Therapeutic Exercise Minutes: (P) 10  Total Minutes  Timed Charges Total Minutes: (P) 10   Total Minutes: (P) 10     Therapy Charges for Today     Code Description Service Date Service Provider Modifiers Qty    31052154007  PT THER PROC EA 15 MIN 4/24/2023 Eric Britton, PT Student GP 1    [id number]  PT RE-EVAL ESTABLISHED PLAN 2 4/24/2023 Eric Britton, PT Student GP 1                    Eric Britton, PT Student  4/24/2023

## 2023-06-08 ENCOUNTER — TELEPHONE (OUTPATIENT)
Dept: NEUROLOGY | Facility: CLINIC | Age: 78
End: 2023-06-08
Payer: MEDICARE

## 2023-06-08 NOTE — TELEPHONE ENCOUNTER
Provider: VICTOR HUGO WOODS  Caller: NAM  Relationship to Patient: [facility]  Phone Number: [phone]  Reason for Call: NAM FROM [facility] CALLED IN TO REQUEST EMG NCV TEST RESULTS TO BE FAXED TO THE FOLLOWING FAX NUMBER; [phone].    PLEASE REVIEW    THANK YOU

## 2023-06-09 ENCOUNTER — OFFICE (OUTPATIENT)
Dept: URBAN - METROPOLITAN AREA CLINIC 65 | Facility: CLINIC | Age: 78
End: 2023-06-09

## 2023-06-09 VITALS
DIASTOLIC BLOOD PRESSURE: 78 MMHG | SYSTOLIC BLOOD PRESSURE: 119 MMHG | WEIGHT: 170 LBS | HEART RATE: 66 BPM | HEIGHT: 62 IN

## 2023-06-09 DIAGNOSIS — R19.7 DIARRHEA, UNSPECIFIED: ICD-10-CM

## 2023-06-09 DIAGNOSIS — K57.90 DIVERTICULOSIS OF INTESTINE, PART UNSPECIFIED, WITHOUT PERFO: ICD-10-CM

## 2023-06-09 PROCEDURE — 99203 OFFICE O/P NEW LOW 30 MIN: CPT | Performed by: INTERNAL MEDICINE

## 2023-08-31 ENCOUNTER — TELEPHONE (OUTPATIENT)
Dept: ORTHOPEDIC SURGERY | Facility: CLINIC | Age: 78
End: 2023-08-31

## 2023-08-31 NOTE — TELEPHONE ENCOUNTER
Provider: DR AMOS   Caller: HOMER SANTIAGO   Phone Number: [phone]  Reason for Call: THE PATIENT WAS GETTING LEFT KNEE INJECTIONS WITH DR AMOS AND DANNY RAMIRES AND WANTS TO GET SCHEDULED FOR ANOTHER.   LAST ONE WAS 1/19/23.   PLEASE ADVISE ON GETTING THE PATIENT SCHEDULED.

## 2023-09-07 ENCOUNTER — CLINICAL SUPPORT (OUTPATIENT)
Dept: ORTHOPEDIC SURGERY | Facility: CLINIC | Age: 78
End: 2023-09-07
Payer: MEDICARE

## 2023-09-07 VITALS — TEMPERATURE: 97.3 F | RESPIRATION RATE: 16 BRPM | WEIGHT: 182 LBS | BODY MASS INDEX: 33.49 KG/M2 | HEIGHT: 62 IN

## 2023-09-07 DIAGNOSIS — M70.50 PES ANSERINE BURSITIS: Primary | ICD-10-CM

## 2023-09-07 RX ORDER — METHYLPREDNISOLONE ACETATE 80 MG/ML
80 INJECTION, SUSPENSION INTRA-ARTICULAR; INTRALESIONAL; INTRAMUSCULAR; SOFT TISSUE
Status: COMPLETED | OUTPATIENT
Start: 2023-09-07 | End: 2023-09-07

## 2023-09-07 RX ORDER — LIDOCAINE HYDROCHLORIDE 10 MG/ML
5 INJECTION, SOLUTION EPIDURAL; INFILTRATION; INTRACAUDAL; PERINEURAL
Status: COMPLETED | OUTPATIENT
Start: 2023-09-07 | End: 2023-09-07

## 2023-09-07 RX ADMIN — METHYLPREDNISOLONE ACETATE 80 MG: 80 INJECTION, SUSPENSION INTRA-ARTICULAR; INTRALESIONAL; INTRAMUSCULAR; SOFT TISSUE at 10:49

## 2023-09-07 RX ADMIN — LIDOCAINE HYDROCHLORIDE 5 ML: 10 INJECTION, SOLUTION EPIDURAL; INFILTRATION; INTRACAUDAL; PERINEURAL at 10:49

## 2023-09-07 NOTE — PROGRESS NOTES
9/7/2023    Olivia Addison is here today for medial knee pain. Pt reports that this pain is different than the pain that she had on her last visit.  Patient reports this pain is located only to the medial portion just below the kneecap.  Patient is very tender to palpation over the Pez anserine bursa region.  We will proceed forward with a bursal injection today.    Knee Bursa Injection Procedure Note    Large Joint Arthrocentesis: L knee  Date/Time: 9/7/2023 10:49 AM  Consent given by: patient  Site marked: site marked  Timeout: Immediately prior to procedure a time out was called to verify the correct patient, procedure, equipment, support staff and site/side marked as required   Supporting Documentation  Indications: joint swelling and pain   Procedure Details  Location: knee - L knee  Preparation: Patient was prepped and draped in the usual sterile fashion  Needle size: 18 G  Approach: medial  Medications administered: 80 mg methylPREDNISolone acetate 80 MG/ML; 5 mL lidocaine PF 1% 1 %  Patient tolerance: patient tolerated the procedure well with no immediate complications    (3 mL of lidocaine was used for anesthetic purposes)    At the conclusion of the injection I discussed the importance of continued fascial stretching on a daily basis.  I will also give her a prescription for a topical anti-inflammatory cream from [facility] that she can apply twice a day as needed.  I will see the patient on a PRN basis or if the symptoms should fail to improve or worsen.    Kwabena Mancilla, APRN  9/7/2023

## 2023-09-12 ENCOUNTER — TELEPHONE (OUTPATIENT)
Dept: ORTHOPEDIC SURGERY | Facility: CLINIC | Age: 78
End: 2023-09-12
Payer: MEDICARE

## 2023-09-12 NOTE — TELEPHONE ENCOUNTER
Patient just received a cortisone injection last week and is not eligible for another injection for 3 months.

## 2023-09-12 NOTE — TELEPHONE ENCOUNTER
Caller: Olivia Addison    Relationship to patient: Self    Best call back number: [phone]    Patient is needing: SCHEDULE LEFT KNEE INJECTION. CAN ALSO SEE NAMAN

## 2023-09-13 NOTE — TELEPHONE ENCOUNTER
I gave this patient a knee bursal injection two weeks ago and she is having joint line pain. Can we call and schedule her an appointment for a joint injection and make sure it's atleast two weeks out from the previous injection.

## 2023-09-13 NOTE — TELEPHONE ENCOUNTER
Spoke with patient, she is set to get a left knee cortisone injection with Kwabena per his instruction. Patient advise to rest, ice, elevate knee to help symptoms between now and next appointment. Patient appreciative of call and verbalized understanding of plan of care

## 2023-09-13 NOTE — TELEPHONE ENCOUNTER
Ok so she is wanting a knee bursal injection. I can do this two week from the date of her joint injection

## 2023-09-21 ENCOUNTER — CLINICAL SUPPORT (OUTPATIENT)
Dept: ORTHOPEDIC SURGERY | Facility: CLINIC | Age: 78
End: 2023-09-21
Payer: MEDICARE

## 2023-09-21 VITALS — HEIGHT: 62 IN | RESPIRATION RATE: 16 BRPM | TEMPERATURE: 97.3 F | WEIGHT: 182.2 LBS | BODY MASS INDEX: 33.53 KG/M2

## 2023-09-21 DIAGNOSIS — M17.12 PRIMARY OSTEOARTHRITIS OF LEFT KNEE: Primary | ICD-10-CM

## 2023-09-22 RX ORDER — METHYLPREDNISOLONE ACETATE 80 MG/ML
80 INJECTION, SUSPENSION INTRA-ARTICULAR; INTRALESIONAL; INTRAMUSCULAR; SOFT TISSUE
Status: COMPLETED | OUTPATIENT
Start: 2023-09-22 | End: 2023-09-22

## 2023-09-22 RX ORDER — LIDOCAINE HYDROCHLORIDE 10 MG/ML
5 INJECTION, SOLUTION EPIDURAL; INFILTRATION; INTRACAUDAL; PERINEURAL
Status: COMPLETED | OUTPATIENT
Start: 2023-09-22 | End: 2023-09-22

## 2023-09-22 RX ADMIN — LIDOCAINE HYDROCHLORIDE 5 ML: 10 INJECTION, SOLUTION EPIDURAL; INFILTRATION; INTRACAUDAL; PERINEURAL at 08:09

## 2023-09-22 RX ADMIN — METHYLPREDNISOLONE ACETATE 80 MG: 80 INJECTION, SUSPENSION INTRA-ARTICULAR; INTRALESIONAL; INTRAMUSCULAR; SOFT TISSUE at 08:09

## 2023-09-22 NOTE — PROGRESS NOTES
9/22/2023    Olivia Addison is here today for worsening knee pain. Pt has undergone injection of the knee in the past with good resolution of symptoms. Pt is requesting a repeat injection.  Patient did recently have a knee bursal injection and reports that her symptoms are significantly improved however she does have an area of bruising that still has not quite healed yet.    KNEE Injection Procedure Note:    Large Joint Arthrocentesis: L knee  Date/Time: 9/22/2023 8:09 AM  Consent given by: patient  Site marked: site marked  Timeout: Immediately prior to procedure a time out was called to verify the correct patient, procedure, equipment, support staff and site/side marked as required   Supporting Documentation  Indications: pain and joint swelling   Procedure Details  Location: knee - L knee  Preparation: Patient was prepped and draped in the usual sterile fashion  Needle size: 22 G  Approach: anterolateral  Medications administered: 80 mg methylPREDNISolone acetate 80 MG/ML; 5 mL lidocaine PF 1% 1 %  Patient tolerance: patient tolerated the procedure well with no immediate complications      (3 mL of lidocaine was used for anesthetic purposes)    At the conclusion of the injection I discussed the importance of continued quad strengthening exercises on a daily basis. I will see the patient back if the symptoms should fail to improve or worsen.    Kwabena Mancilla, APRN  9/22/2023

## 2023-10-12 ENCOUNTER — TELEPHONE (OUTPATIENT)
Dept: ORTHOPEDIC SURGERY | Facility: CLINIC | Age: 78
End: 2023-10-12

## 2023-10-12 NOTE — TELEPHONE ENCOUNTER
Caller: Olivia Addison    Relationship to patient: Self    Best call back number: [phone]    Patient is needing: PATIENT RECEIVED CORTISONE INJECTION LT KNEE 09/07/23 AND 09/21/23 BY [facility] AND SHE IS EXPERIENCING SAME PAIN THAN BEFORE AND IS SUSPECTING THAT INJECTIONS WERE APPLIED IN WRONG SPOTS. PATIENT SEEKING CLINICAL ADVISE REGARDING ANOTHER INJECTION WITH ARLIN BARNHART OR FOLLOW UP WITH DR. AMOS. PLEASE ADVISE.

## 2023-10-12 NOTE — TELEPHONE ENCOUNTER
Spoke with patient and scheduled her for next available with Dr. Mccrary. Advised her to use ice/heat and try otc voltaren gel.

## 2023-11-14 ENCOUNTER — OFFICE VISIT (OUTPATIENT)
Dept: ORTHOPEDIC SURGERY | Facility: CLINIC | Age: 78
End: 2023-11-14
Payer: MEDICARE

## 2023-11-14 ENCOUNTER — PREP FOR SURGERY (OUTPATIENT)
Dept: OTHER | Facility: HOSPITAL | Age: 78
End: 2023-11-14
Payer: MEDICARE

## 2023-11-14 VITALS — WEIGHT: 175 LBS | HEIGHT: 62 IN | TEMPERATURE: 96.9 F | BODY MASS INDEX: 32.2 KG/M2

## 2023-11-14 DIAGNOSIS — R52 PAIN: Primary | ICD-10-CM

## 2023-11-14 DIAGNOSIS — G89.29 CHRONIC PAIN OF LEFT KNEE: Primary | ICD-10-CM

## 2023-11-14 DIAGNOSIS — M17.12 PRIMARY OSTEOARTHRITIS OF LEFT KNEE: Primary | ICD-10-CM

## 2023-11-14 DIAGNOSIS — M25.562 CHRONIC PAIN OF LEFT KNEE: Primary | ICD-10-CM

## 2023-11-14 DIAGNOSIS — M17.12 PRIMARY OSTEOARTHRITIS OF LEFT KNEE: ICD-10-CM

## 2023-11-14 PROCEDURE — 1159F MED LIST DOCD IN RCRD: CPT | Performed by: NURSE PRACTITIONER

## 2023-11-14 PROCEDURE — 99214 OFFICE O/P EST MOD 30 MIN: CPT | Performed by: NURSE PRACTITIONER

## 2023-11-14 PROCEDURE — 1160F RVW MEDS BY RX/DR IN RCRD: CPT | Performed by: NURSE PRACTITIONER

## 2023-11-14 PROCEDURE — 73562 X-RAY EXAM OF KNEE 3: CPT | Performed by: NURSE PRACTITIONER

## 2023-11-14 RX ORDER — CHLORHEXIDINE GLUCONATE 500 MG/1
CLOTH TOPICAL 2 TIMES DAILY
OUTPATIENT
Start: 2023-11-14

## 2023-11-14 RX ORDER — CEFAZOLIN SODIUM 2 G/100ML
2 INJECTION, SOLUTION INTRAVENOUS ONCE
OUTPATIENT
Start: 2023-11-14 | End: 2023-11-14

## 2023-11-14 RX ORDER — PREGABALIN 75 MG/1
150 CAPSULE ORAL ONCE
OUTPATIENT
Start: 2023-11-14 | End: 2023-11-14

## 2023-11-14 NOTE — PROGRESS NOTES
Patient: Olivia Addison  YOB: 1945 78 y.o. female  Medical Record Number: [id number]    Chief Complaints:   Chief Complaint   Patient presents with    Left Knee - Follow-up       History of Present Illness:Olivia Addison is a 78 y.o. female who presents with complaints of worsening in left knee pain, the patient has tried and failed conservative measures including injections, anti-inflammatory creams, physical therapy exercises, she would like to proceed with surgery    Allergies:   Allergies   Allergen Reactions    Sulfa Antibiotics Itching and Rash     Hands turned red w/a rash       Medications:   Current Outpatient Medications   Medication Sig Dispense Refill    atorvastatin (LIPITOR) 40 MG tablet Take 1 tablet by mouth Daily.      buPROPion XL (WELLBUTRIN XL) 300 MG 24 hr tablet Take 1 tablet by mouth Daily.      carvedilol (COREG) 25 MG tablet Take 1 tablet by mouth 2 (Two) Times a Day.      furosemide (LASIX) 20 MG tablet Take 1 tablet by mouth Every Other Day.      PARoxetine (PAXIL) 20 MG tablet Take 1 tablet by mouth Daily.      sacubitril-valsartan (ENTRESTO) 24-26 MG tablet Take 2 tablets by mouth 2 (Two) Times a Day.      spironolactone (ALDACTONE) 25 MG tablet TAKE 1 TABLET BY MOUTH EVERY DAY (Patient taking differently: Every Other Day.) 90 tablet 3    TURMERIC PO Take  by mouth Daily.      VITAMIN D PO Take  by mouth Daily.      acyclovir (ZOVIRAX) 400 MG tablet Take 1 tablet by mouth 2 (Two) Times a Day. Take no more than 5 doses a day. (Patient not taking: Reported on 11/14/2023)       No current facility-administered medications for this visit.         The following portions of the patient's history were reviewed and updated as appropriate: allergies, current medications, past family history, past medical history, past social history, past surgical history and problem list.    Review of Systems:   14 point review of systems was performed. All systems negative except pertinent  "positives/negatives listed in HPI above    Physical Exam:   Vitals:    11/14/23 1344   Temp: 96.9 °F (36.1 °C)   Weight: 79.4 kg (175 lb)   Height: 157.5 cm (62\")   PainSc:   8   PainLoc: Knee       General: A and O x 3, ASA, NAD   Skin clear no unusual lesions noted  Left knee patient has trace amount of effusion noted with 116 degrees flexion neutral in extension with a positive Marta negative Lockman calf soft and nontender       Radiology:  Xrays 3views (ap,lateral, sunrise) left knee were ordered and reviewed today secondary to increasing pain show bone-on-bone end-stage osteoarthritis with cyst and spur formation.  Comparative views show definite progression in arthritic changes    Assessment/Plan: End-stage osteoarthritis left knee with severe pain    Patient and I as well as Dr. Mccrary all discussed options, she would like to proceed with left total knee replacement overnight stay.  She has an appointment with her cardiologist tomorrow we will make sure that they clear her for surgery, we will send her to physical therapy for 1 additional visit.  Continuation of conservative management vs. TKA discussed.  The patient wishes to proceed with total knee replacement.  At this point the patient has failed the full compliment of conservative treatment and stating complete understanding of the risks/benefits/ anternatives wishes to proceed with surgical treatment.    Risk and benefits of surgery were reviewed.  Including, but not limited to, blood clots or pulmonary embolism, anesthesia risk, infection, fracture, skin/leg numbness, persistent pain/crepitance/popping/catching, failure of the implant, need for future surgeries, hematoma, possible nerve or blood vessel injury, need for transfusion, and potential risk of stroke,heart attack or death, among others.  The patient understands and wishes to proceed.     It was explained that if tissue has been repaired or reconstructed, there is also an increased chance " of failure which may require further management.  Following the completion of the discussion, the patient expressed understanding of this planned course of care, all their questions were answered and consent will be obtained preoperatively.    Operative Plan: [facility] and Nephniraj Oxinium Total Knee Replacement an overnight stay with home health rehab       Joy Guillermo, APRN  11/14/2023

## 2023-11-27 PROBLEM — M17.12 PRIMARY OSTEOARTHRITIS OF LEFT KNEE: Status: ACTIVE | Noted: 2023-11-14

## 2023-11-28 ENCOUNTER — HOSPITAL ENCOUNTER (OUTPATIENT)
Dept: PHYSICAL THERAPY | Facility: HOSPITAL | Age: 78
Discharge: HOME | End: 2023-11-28
Admitting: NURSE PRACTITIONER
Payer: MEDICARE

## 2023-11-28 DIAGNOSIS — M25.562 CHRONIC PAIN OF LEFT KNEE: Primary | ICD-10-CM

## 2023-11-28 DIAGNOSIS — G89.29 CHRONIC PAIN OF LEFT KNEE: Primary | ICD-10-CM

## 2023-11-28 DIAGNOSIS — R26.2 DIFFICULTY WALKING: ICD-10-CM

## 2023-11-28 PROCEDURE — 97161 PT EVAL LOW COMPLEX 20 MIN: CPT

## 2023-11-28 PROCEDURE — 97530 THERAPEUTIC ACTIVITIES: CPT

## 2023-11-28 NOTE — THERAPY EVALUATION
Outpatient Physical Therapy Ortho Initial Evaluation  [facility]     Patient Name: Olivia Addison  : 1945  MRN: [id number]  Today's Date: 2023      Visit Date: 2023    Patient Active Problem List   Diagnosis    H/O tricuspid valve repair    Abdominal pain    Abdominal pain, left lower quadrant    Acute conjunctivitis    Adjustment disorder with anxious mood    Alcohol abuse    Anxiety    Attention deficit disorder    Biventricular implantable cardioverter-defibrillator in situ    Cardiomyopathy, nonischemic    Diverticulitis of colon    Diverticulosis of intestine    Flank pain    H/O Estrellita-Biswas syndrome    Hematuria    Hypercholesterolemia    Low back pain    AMADO (obstructive sleep apnea)    Posttraumatic stress disorder    PTSD (post-traumatic stress disorder)    Urinary tract infection    S/P MVR (mitral valve repair)    Diverticulitis    Class 1 obesity in adult    LBBB (left bundle branch block)    CKD (chronic kidney disease) stage 3, GFR 30-59 ml/min    Weakness of right upper extremity    Numbness and tingling in right hand    Ulnar neuropathy at wrist, right    Primary osteoarthritis of left knee        Past Medical History:   Diagnosis Date    ADHD (attention deficit hyperactivity disorder)     Adjustment disorder with anxiety     Allergic rhinitis     Anxiety and depression     Arthritis     Atherosclerosis     Atrial fibrillation     HX    Atrophic vaginitis     Blepharitis of both eyes     CAD (coronary artery disease)     Cardiomyopathy     NON ISCHEMIC     Cataract     CHF (congestive heart failure)     CHRONIC SYSTOLIC    CKD (chronic kidney disease) stage 3, GFR 30-59 ml/min     DDD (degenerative disc disease), cervical     Diaphoresis 2019    SEEN AT [facility]    Diverticulitis 2019    SEEN AT [facility] ER    Diverticulitis 11/15/2016    Diverticulitis 2014    Diverticulitis     Diverticulitis large intestine 2017    Diverticulitis of colon 2019     ADMITTED TO [facility]    Dry eye syndrome     Dyspnea     Dysuria 06/2019    Esophageal injury     ESTRELLITA-RAMOS SYNDROME    Fatigue     Heart failure     Hematuria 08/2019    History of frequent urinary tract infections     History of mitral valve repair     History of sepsis     FROM UTI    Hypercholesterolemia     Hyperlipidemia     Hypertension     Kidney cysts 07/2017    FOLLOWED BY DR. RITU AMOS    LBBB (left bundle branch block)     Estrellita-Ramos syndrome     TEAR IN ESOPHAGUS AFTER HEART SURGERY    Memory loss     NSVT (nonsustained ventricular tachycardia)     AMADO (obstructive sleep apnea)      Bi-Pap    Osteoporosis     Patent foramen ovale     Pneumonia 05/25/2017    Presbyopia     Presence of cardiac defibrillator     PTSD (post-traumatic stress disorder)     Reactive airway disease     Regular astigmatism     RLS (restless legs syndrome)     S/P TVR (tricuspid valve repair)     Seborrheic keratosis     FOLLOWED BY DR. HARMAN CÁRDENAS    Segmental and somatic dysfunction of cervical region     Segmental and somatic dysfunction of thoracic region     SOB (shortness of breath) on exertion     R/T HEART    Vitamin D deficiency     VT (ventricular tachycardia)         Past Surgical History:   Procedure Laterality Date    APPENDECTOMY N/A     BIVENTRICULLAR IMPLANTABLE CARDIOVERTER DEFIBRILLATOR PLACEMENT N/A 04/10/2012    [employer], BI-V, DR. MICKEY MORALES AT [facility]    BLADDER SUSPENSION N/A     BLEPHAROPLASTY Bilateral 3/3/2022    Procedure: BILATERAL UPPER LID BLEPHAROPLASTY;  Surgeon: Edu Power MD;  [place] [facility] [place] [facility] [place]  Service: Ophthalmology;  Laterality: Bilateral;    BLEPHAROPLASTY Bilateral 3/3/2022    Procedure: BILATERAL LOWER LID BLEPHAROPLASTY;  Surgeon: Edu Power MD;  [place] [facility] [place] [facility] [place]  Service: New Image;  Laterality: Bilateral;    BREAST CYST ASPIRATION      BREAST SURGERY Bilateral     REDUCTION MAMMAPLASTY    BROW LIFT Bilateral 3/3/2022    Procedure: BILATERAL  LATERAL BROWPEXY;  Surgeon: Edu Power MD;  Location:  [facility];  Service: Ophthalmology;  Laterality: Bilateral;    CARDIAC CATHETERIZATION Bilateral 03/19/2012    Normal coronary arteries, severe mitral regurgitation and aortic regurgitation. EF was about 30-35%.; DR. JYOTI ROMERO AT [facility]    CARDIAC ELECTROPHYSIOLOGY PROCEDURE Left 7/11/2016    Procedure: PPM battery change [employer];  Surgeon: Hunter Faust MD;  Location:  [facility] CATH INVASIVE LOCATION;  Service:     CATARACT EXTRACTION Bilateral     COLONOSCOPY N/A 10/14/2014    PANDIVERTICULOSIS IN ENTIRE COLON, DR. BRANDEN RUIZ AT [facility]    COLONOSCOPY N/A 11/13/2019    SCATTERED SMALL AND LARGE DIVERTICULA IN ENTIRE COLON, RESCOPE IN 10 YRS, DR. PERCY MUHAMMAD AT [facility]    ENDOSCOPY N/A 06/06/2012    CHAD-RAMOS TEAR WITH ACTIVE BLEEDINGBLOOD THROUGHOUT STOMACH, BLOOD IN DUODENUM, DR. TARUN GILLIS AT [facility]    ESOPHAGUS SURGERY      TEAR DURING HEART SURGERY, AT BOTTOM OF ESOPHAGUS    HYSTERECTOMY N/A 1980    OLIVIA WITH BSO    IMPLANTABLE CARDIOVERTER DEFIBRILLATOR LEAD REPLACEMENT/POCKET REVISION N/A 08/28/2012    DR. GISELLE BOOGIE AT [facility]    MITRAL VALVE REPAIR/REPLACEMENT N/A 06/06/2012    REMODELING  ANULOPLASTY USING 24 MM BARAJAS ANULOPLASTY RING, MODEL 5200, SERIAL # [id number], DR. VINCE ZAMUDIO AT [facility]    PATENT FORAMEN OVALE CLOSURE N/A 06/06/2012    DR. VINCE ZAMUDIO AT [facility]    TONSILLECTOMY Bilateral     TRICUSPID VALVE SURGERY N/A 06/06/2012    REMODELING ANULOPLASTY USING 26 MM BARAJAS ANULOPLASTY RING, MODEL 6200, SERIAL # [id number], DR. VINCE ZAMDUIO AT [facility]    VAGINAL DELIVERY N/A 1974       Visit Dx:     ICD-10-CM ICD-9-CM   1. Chronic pain of left knee  M25.562 719.46    G89.29 338.29   2. Difficulty walking  R26.2 719.7          Patient History       Row Name 11/28/23 1200             History    Chief Complaint Pain  -RS      Type of Pain Knee pain  -RS      Brief Description of Current Complaint The pt is a 79 yo  "female who presents with chronic L knee pain for \"pre-hab\" LTKA scheduled in Feb 2024. She walks 20-25 min 2x daily with her dog. She lives in a second story [facility] without an elevator, HR on the L side. Her daughter lives with her and will be present after surgery. She is a retired teacher, pmhx include CHF and multiple surgeries subsequently (many years prior).  -RS      Occupation/sports/leisure activities walking dog, very active, retired from teaching  -RS         Pain     Is your sleep disturbed? No  -RS         Fall Risk Assessment    Any falls in the past year: No  -RS         Services    Are you currently receiving Home Health services No  -RS         Daily Activities    Primary Language English  -RS      How does patient learn best? Listening;Reading;Demonstration  -RS      Teaching needs identified Home Exercise Program  -RS      Pt Participated in POC and Goals Yes  -RS         Safety    Are you being hurt, hit, or frightened by anyone at home or in your life? No  -RS      Are you being neglected by a caregiver No  -RS                User Key  (r) = Recorded By, (t) = Taken By, (c) = Cosigned By      Initials Name Provider Type    RS Caitlyn Miranda PT Physical Therapist                     PT Ortho       Row Name 11/28/23 1100       Special Tests/Palpation    Special Tests/Palpation Knee  -RS       General ROM    RT Lower Ext Rt Knee Extension/Flexion  -RS    LT Lower Ext Lt Knee Extension/Flexion  -RS       Right Lower Ext    Rt Knee Extension/Flexion AROM 0-122  -RS       Left Lower Ext    Lt Knee Extension/Flexion AROM 0-124  -RS       MMT (Manual Muscle Testing)    Rt Lower Ext Rt Hip Flexion;Rt Hip ABduction;Rt Knee Extension;Rt Knee Flexion;Rt Ankle Plantarflexion;Rt Ankle Dorsiflexion  -RS    Lt Lower Ext Lt Hip Flexion;Lt Hip ABduction;Lt Knee Extension;Lt Knee Flexion;Lt Ankle Plantarflexion;Lt Ankle Dorsiflexion  -RS       MMT Right Lower Ext    Rt Hip Flexion MMT, Gross Movement (4+/5) good " plus  -RS    Rt Hip ABduction MMT, Gross Movement (4/5) good  -RS    Rt Knee Extension MMT, Gross Movement (4+/5) good plus  -RS    Rt Knee Flexion MMT, Gross Movement (4+/5) good plus  -RS    Rt Ankle Plantarflexion MMT, Gross Movement (4+/5) good plus  -RS    Rt Ankle Dorsiflexion MMT, Gross Movement (5/5) normal  -RS       MMT Left Lower Ext    Lt Hip Flexion MMT, Gross Movement (4/5) good  -RS    Lt Hip ABduction MMT, Gross Movement (4-/5) good minus  -RS    Lt Knee Extension MMT, Gross Movement (4-/5) good minus  -RS    Lt Knee Flexion MMT, Gross Movement (4/5) good  -RS    Lt Ankle Plantarflexion MMT, Gross Movement (4/5) good  -RS    Lt Ankle Dorsiflexion MMT, Gross Movement (4/5) good  -RS       Sensation    Light Touch No apparent deficits  -RS       Flexibility    Flexibility Tested? Lower Extremity  -RS       Lower Extremity Flexibility    Hamstrings Bilateral:;Mildly limited  -RS       Balance Skills Training    SLS dec L SLS compared to R  -RS       Gait/Stairs (Locomotion)    Comment, (Gait/Stairs) dec L TKE at , dec yanna  -RS              User Key  (r) = Recorded By, (t) = Taken By, (c) = Cosigned By      Initials Name Provider Type    RS Caitlyn Miranda PT Physical Therapist                                Therapy Education  Education Details: Role of outpatient PT, POC, differential diagnosis, initial HEP, expectations, goals, anatomy.  Given: HEP (post op expectations)  Program: New  How Provided: Verbal, Demonstration, Written  Provided to: Patient  Level of Understanding: Verbalized, Demonstrated      PT OP Goals       Row Name 11/28/23 1300          PT Short Term Goals    STG Date to Achieve 01/12/24  -RS     STG 1 The pt will report understanding of stair navigation and AD use post op to facilitate improved safety with home navigation.  -RS     STG 1 Progress New  -RS        Long Term Goals    LTG Date to Achieve 02/11/24  -RS     LTG 1 The pt will demonstrate IND and compliant with  initial HEP focused on improved L knee mobility and quad strength.  -RS     LTG 1 Progress New  -RS     LTG 2 The pt will demonstrate L knee strength at least 4+/5 to facilitate improved post op recovery.  -RS     LTG 2 Progress New  -RS        Time Calculation    PT Goal Re-Cert Due Date 02/26/24  -RS               User Key  (r) = Recorded By, (t) = Taken By, (c) = Cosigned By      Initials Name Provider Type    RS Caitlyn Miranda, PT Physical Therapist                     PT Assessment/Plan       Row Name 11/28/23 1300          PT Assessment    Functional Limitations Limitation in home management;Limitations in community activities;Performance in leisure activities;Performance in self-care ADL  -RS     Impairments Balance;Range of motion;Posture;Poor body mechanics;Pain;Muscle strength  -RS     Assessment Comments Olivia Addison is a 78 y.o. female referred to physical therapy for L knee pain pre TKA in Feb 2024. She presents with a stable clinical presentation, along with no remarkable comorbidities and personal factors of chronicity of pain  that may impact her progress in the plan of care. Pt presents today with decreased L knee and hip strength, decreased patellar mobility sup/inf, altered gait pattern, decreased SLS L compared to R LE . her signs and symptoms are consistent with referring diagnosis. The previous impairments limit her ability to perform household chores and walking without pain. The pt self scores 22% ability on the KOS (100=full ability).Pt will benefit from skilled PT to address the previous impairments and return to PLOF.  -RS     Please refer to paper survey for additional self-reported information No  -RS     Rehab Potential Good  -RS     Patient/caregiver participated in establishment of treatment plan and goals Yes  -RS     Patient would benefit from skilled therapy intervention Yes  -RS        PT Plan    PT Frequency 1x/week  -RS     Predicted Duration of Therapy Intervention (PT)  3-4 sessions  -RS     Planned CPT's? PT EVAL LOW COMPLEXITY: 10236;PT THER PROC EA 15 MIN: 70714;PT RE-EVAL: 77347;PT THER ACT EA 15 MIN: 03437;PT MANUAL THERAPY EA 15 MIN: 83401;PT NEUROMUSC RE-EDUCATION EA 15 MIN: 18371;PT GAIT TRAINING EA 15 MIN: 72986  -RS     PT Plan Comments Review stair navigation and AD use post op, post op expectations, knee mobility  -RS               User Key  (r) = Recorded By, (t) = Taken By, (c) = Cosigned By      Initials Name Provider Type    RS Caitlyn Miranda, PT Physical Therapist                       OP Exercises       Row Name 11/28/23 1300             Total Minutes    72720 - PT Therapeutic Activity Minutes 19  -RS         Exercise 1    Exercise Name 1 nustep  -RS      Additional Comments next time  -RS         Exercise 2    Exercise Name 2 QS towel ankle  -RS      Cueing 2 Verbal;Demo  -RS      Reps 2 10  -RS      Time 2 5  -RS         Exercise 3    Exercise Name 3 SLR  -RS      Cueing 3 Verbal;Demo  -RS      Reps 3 10  -RS         Exercise 4    Exercise Name 4 bridge  -RS      Cueing 4 Verbal;Demo  -RS      Reps 4 10  -RS      Time 4 5  -RS         Exercise 5    Exercise Name 5 STS  -RS      Cueing 5 Verbal;Demo  -RS      Reps 5 10  -RS         Exercise 6    Exercise Name 6 discussion of anatomy, post op expectations, TKA model, HEP, expectations, tissue healing timelines  -RS                User Key  (r) = Recorded By, (t) = Taken By, (c) = Cosigned By      Initials Name Provider Type    RS Caitlyn Miranda, PT Physical Therapist                                  Outcome Measure Options: Knee Outcome Score- ADL  Knee Outcome Survey Activities of Daily Living Scale  Symptoms: Pain: The symptom affects my activity severely  Symptoms: Stiffness: The symptom prevents me from all daily activity  Symptoms: Swelling: The symptom prevents me from all daily activity  Symptoms: Giving way, buckling, or shifting of the knee: The symptom prevents me from all daily activity  Symptoms:  Weakness: The symptom prevents me from all daily activity  Symptoms: Limping: The symptom affects my activity severely  Functional Limitations with ADL's: Walk: Activity is very difficult  Functional Limitations with ADL's: Go up stairs: Activity is very difficult  Functional Limitations with ADL's: Go down stairs: Activity is very difficult  Functional Limitations with ADL's: Stand: Activity is somewhat difficult  Functional Limitations with ADL's: Kneel on front of your knee: Activity is fairly difficult  Functional Limitations with ADL's: Squat: Activity is fairly difficult  Functional Limitations with ADL's: Sit with your knee bent: Activity is fairly difficult  Functional Limitations with ADL's: Rise from a chair: Activity is fairly difficult  Knee Outcome Summary ADL's Score: 22.86 %      Time Calculation:     Start Time: 1133  Stop Time: 1215  Time Calculation (min): 42 min  Timed Charges  06329 - PT Therapeutic Activity Minutes: 19  Untimed Charges  PT Eval/Re-eval Minutes: 21  Total Minutes  Timed Charges Total Minutes: 19  Untimed Charges Total Minutes: 21   Total Minutes: 21     Therapy Charges for Today       Code Description Service Date Service Provider Modifiers Qty    87253559581 HC PT THERAPEUTIC ACT EA 15 MIN 11/28/2023 Caitlyn Miranda, PT GP 1    39492165804 HC PT EVAL LOW COMPLEXITY 2 11/28/2023 Caitlyn Miranda, PT GP 1            PT G-Codes  Outcome Measure Options: Knee Outcome Score- ADL         Caitlyn Miranda PT  11/28/2023

## 2023-12-21 ENCOUNTER — HOSPITAL ENCOUNTER (OUTPATIENT)
Dept: PHYSICAL THERAPY | Facility: HOSPITAL | Age: 78
Setting detail: THERAPIES SERIES
Discharge: HOME | End: 2023-12-21
Payer: MEDICARE

## 2023-12-21 DIAGNOSIS — M25.562 CHRONIC PAIN OF LEFT KNEE: Primary | ICD-10-CM

## 2023-12-21 DIAGNOSIS — R26.2 DIFFICULTY WALKING: ICD-10-CM

## 2023-12-21 DIAGNOSIS — G89.29 CHRONIC PAIN OF LEFT KNEE: Primary | ICD-10-CM

## 2023-12-21 PROCEDURE — 97530 THERAPEUTIC ACTIVITIES: CPT

## 2023-12-21 NOTE — THERAPY DISCHARGE NOTE
Outpatient Physical Therapy Ortho Treatment Note/Discharge Summary  [facility]     Patient Name: Olivia Addison  : 1945  MRN: [id number]  Today's Date: 2023      Visit Date: 2023    Visit Dx:    ICD-10-CM ICD-9-CM   1. Chronic pain of left knee  M25.562 719.46    G89.29 338.29   2. Difficulty walking  R26.2 719.7       Patient Active Problem List   Diagnosis    H/O tricuspid valve repair    Abdominal pain    Abdominal pain, left lower quadrant    Acute conjunctivitis    Adjustment disorder with anxious mood    Alcohol abuse    Anxiety    Attention deficit disorder    Biventricular implantable cardioverter-defibrillator in situ    Cardiomyopathy, nonischemic    Diverticulitis of colon    Diverticulosis of intestine    Flank pain    H/O Estrellita-Biswas syndrome    Hematuria    Hypercholesterolemia    Low back pain    AMADO (obstructive sleep apnea)    Posttraumatic stress disorder    PTSD (post-traumatic stress disorder)    Urinary tract infection    S/P MVR (mitral valve repair)    Diverticulitis    Class 1 obesity in adult    LBBB (left bundle branch block)    CKD (chronic kidney disease) stage 3, GFR 30-59 ml/min    Weakness of right upper extremity    Numbness and tingling in right hand    Ulnar neuropathy at wrist, right    Primary osteoarthritis of left knee        Past Medical History:   Diagnosis Date    ADHD (attention deficit hyperactivity disorder)     Adjustment disorder with anxiety     Allergic rhinitis     Anxiety and depression     Arthritis     Atherosclerosis     Atrial fibrillation     HX    Atrophic vaginitis     Blepharitis of both eyes     CAD (coronary artery disease)     Cardiomyopathy     NON ISCHEMIC     Cataract     CHF (congestive heart failure)     CHRONIC SYSTOLIC    CKD (chronic kidney disease) stage 3, GFR 30-59 ml/min     DDD (degenerative disc disease), cervical     Diaphoresis 2019    SEEN AT [facility]    Diverticulitis 2019    SEEN AT [facility]     Diverticulitis 11/15/2016    Diverticulitis 09/07/2014    Diverticulitis     Diverticulitis large intestine 05/23/2017    Diverticulitis of colon 08/22/2019    ADMITTED TO [facility]    Dry eye syndrome     Dyspnea     Dysuria 06/2019    Esophageal injury     ESTRELLITA-RAMOS SYNDROME    Fatigue     Heart failure     Hematuria 08/2019    History of frequent urinary tract infections     History of mitral valve repair     History of sepsis     FROM UTI    Hypercholesterolemia     Hyperlipidemia     Hypertension     Kidney cysts 07/2017    FOLLOWED BY DR. RITU AMOS    LBBB (left bundle branch block)     Estrellita-Ramos syndrome     TEAR IN ESOPHAGUS AFTER HEART SURGERY    Memory loss     NSVT (nonsustained ventricular tachycardia)     AMADO (obstructive sleep apnea)      Bi-Pap    Osteoporosis     Patent foramen ovale     Pneumonia 05/25/2017    Presbyopia     Presence of cardiac defibrillator     PTSD (post-traumatic stress disorder)     Reactive airway disease     Regular astigmatism     RLS (restless legs syndrome)     S/P TVR (tricuspid valve repair)     Seborrheic keratosis     FOLLOWED BY DR. HARMAN CÁRDENAS    Segmental and somatic dysfunction of cervical region     Segmental and somatic dysfunction of thoracic region     SOB (shortness of breath) on exertion     R/T HEART    Vitamin D deficiency     VT (ventricular tachycardia)         Past Surgical History:   Procedure Laterality Date    APPENDECTOMY N/A     BIVENTRICULLAR IMPLANTABLE CARDIOVERTER DEFIBRILLATOR PLACEMENT N/A 04/10/2012    [employer], BI-V, DR. MICKEY MORALES AT [facility]    BLADDER SUSPENSION N/A     BLEPHAROPLASTY Bilateral 3/3/2022    Procedure: BILATERAL UPPER LID BLEPHAROPLASTY;  Surgeon: Edu Power MD;  Location: [facility];  Service: Ophthalmology;  Laterality: Bilateral;    BLEPHAROPLASTY Bilateral 3/3/2022    Procedure: BILATERAL LOWER LID BLEPHAROPLASTY;  Surgeon: Edu Power MD;  Location:  [facility];  Service: New Image;   Laterality: Bilateral;    BREAST CYST ASPIRATION      BREAST SURGERY Bilateral     REDUCTION MAMMAPLASTY    BROW LIFT Bilateral 3/3/2022    Procedure: BILATERAL LATERAL BROWPEXY;  Surgeon: Edu Power MD;  [place] [facility] [place] [facility] [place]  Service: Ophthalmology;  Laterality: Bilateral;    CARDIAC CATHETERIZATION Bilateral 03/19/2012    Normal coronary arteries, severe mitral regurgitation and aortic regurgitation. EF was about 30-35%.; DR. JYOTI ROMERO AT [facility]    CARDIAC ELECTROPHYSIOLOGY PROCEDURE Left 7/11/2016    Procedure: PPM battery change [employer];  Surgeon: Hunter Faust MD;  Location: [facility] CATH INVASIVE LOCATION;  Service:     CATARACT EXTRACTION Bilateral     COLONOSCOPY N/A 10/14/2014    PANDIVERTICULOSIS IN ENTIRE COLON, DR. BRANDEN RUIZ AT [facility]    COLONOSCOPY N/A 11/13/2019    SCATTERED SMALL AND LARGE DIVERTICULA IN ENTIRE COLON, RESCOPE IN 10 YRS, DR. PERCY MUHAMMAD AT [facility]    ENDOSCOPY N/A 06/06/2012    CHAD-RAMOS TEAR WITH ACTIVE BLEEDINGBLOOD THROUGHOUT STOMACH, BLOOD IN DUODENUM, DR. TARUN GILLIS AT [facility]    ESOPHAGUS SURGERY      TEAR DURING HEART SURGERY, AT BOTTOM OF ESOPHAGUS    HYSTERECTOMY N/A 1980    OLIVIA WITH BSO    IMPLANTABLE CARDIOVERTER DEFIBRILLATOR LEAD REPLACEMENT/POCKET REVISION N/A 08/28/2012    DR. GISELLE BOOGIE AT [facility]    MITRAL VALVE REPAIR/REPLACEMENT N/A 06/06/2012    REMODELING  ANULOPLASTY USING 24 MM BARAJAS ANULOPLASTY RING, MODEL 5200, SERIAL # [id number], DR. VINCE ZAMUDIO AT [facility]    PATENT FORAMEN OVALE CLOSURE N/A 06/06/2012    DR. VINCE ZAMUDIO AT [facility]    TONSILLECTOMY Bilateral     TRICUSPID VALVE SURGERY N/A 06/06/2012    REMODELING ANULOPLASTY USING 26 MM BARAJAS ANULOPLASTY RING, MODEL 6200, SERIAL # [id number], DR. VINCE ZAMUDIO AT [facility]    VAGINAL DELIVERY N/A 1974                        PT Assessment/Plan       Row Name 12/21/23 1400          PT Assessment    Assessment Comments Pt returns for first follow up session reproting good compliance  with HEP and understanding. Focused on IND with HEP and review of post op precautions and expectations and timeline. Instructed pt regarding safe stair navigation with HR on R ascending with SPC, pt demonstrates safe technique. Plan to DC from OP PT this date, pt will return post op once appropriate, pt reports understanding.  -RS               User Key  (r) = Recorded By, (t) = Taken By, (c) = Cosigned By      Initials Name Provider Type    RS Caitlyn Miranda PT Physical Therapist                         OP Exercises       Row Name 12/21/23 1400             Subjective    Subjective Comments pt reports her exercises have gone well  -RS         Total Minutes    50905 - PT Therapeutic Activity Minutes 40  -RS         Exercise 1    Exercise Name 1 review of post op expectations, tissue healing timeline  -RS         Exercise 2    Exercise Name 2 TKA review with model  -RS         Exercise 3    Exercise Name 3 stair navigation with SPC  -RS      Additional Comments safety, HR R, use of chair on landing  -RS         Exercise 4    Exercise Name 4 importance of mobility post op- keeping knkee extension with pillow under calf rather than under knee  -RS         Exercise 5    Exercise Name 5 review of AD fitting- RW and SPC  -RS         Exercise 6    Exercise Name 6 post op PT timeline- HH PT transitioning to OP PT  -RS         Exercise 7    Exercise Name 7 wound care/expectation  -RS                User Key  (r) = Recorded By, (t) = Taken By, (c) = Cosigned By      Initials Name Provider Type    RS Caitlyn Miranda PT Physical Therapist                                    PT OP Goals       Row Name 12/21/23 1400          PT Short Term Goals    STG Date to Achieve 01/12/24  -RS     STG 1 The pt will report understanding of stair navigation and AD use post op to facilitate improved safety with home navigation.  -RS     STG 1 Progress Met  -RS        Long Term Goals    LTG Date to Achieve 02/11/24  -RS     LTG 1 The pt will  demonstrate IND and compliant with initial HEP focused on improved L knee mobility and quad strength.  -RS     LTG 1 Progress Met  -RS     LTG 2 The pt will demonstrate L knee strength at least 4+/5 to facilitate improved post op recovery.  -RS     LTG 2 Progress Partially Met  -RS               User Key  (r) = Recorded By, (t) = Taken By, (c) = Cosigned By      Initials Name Provider Type    RS Caitlyn Miranda, PT Physical Therapist                    Therapy Education  Education Details: see ex column  Given: HEP  Program: Reinforced  How Provided: Verbal, Demonstration  Provided to: Patient  Level of Understanding: Verbalized, Demonstrated              Time Calculation:   Start Time: 1317  Stop Time: 1404  Time Calculation (min): 47 min  Timed Charges  86618 - PT Therapeutic Activity Minutes: 40  Total Minutes  Timed Charges Total Minutes: 40   Total Minutes: 40  Therapy Charges for Today       Code Description Service Date Service Provider Modifiers Qty    [id number]  PT THERAPEUTIC ACT EA 15 MIN 12/21/2023 Caitlyn Miranda, PT GP 3                  OP PT Discharge Summary  Date of Discharge: 12/21/23  Reason for Discharge: other (comment)  Outcomes Achieved: Refer to plan of care for updates on goals achieved  Discharge Destination: Home with home program  Discharge Instructions/Additional Comments: see assessment      Caitlyn Miranda PT  12/21/2023

## 2024-01-31 ENCOUNTER — TELEPHONE (OUTPATIENT)
Dept: ORTHOPEDIC SURGERY | Facility: CLINIC | Age: 79
End: 2024-01-31
Payer: MEDICARE

## 2024-01-31 NOTE — TELEPHONE ENCOUNTER
Provider: DR AMOS    Caller: HOMER SANTIAGO    Relationship to Patient: SELF    Phone Number: [phone]    Reason for Call: NEEDS TO HAVE THE KNEE REPLACEMENT PRE-SURGERY VIDEO RESENT TO HER.

## 2024-03-13 ENCOUNTER — TELEPHONE (OUTPATIENT)
Dept: ORTHOPEDIC SURGERY | Facility: CLINIC | Age: 79
End: 2024-03-13
Payer: MEDICARE

## 2024-03-13 ENCOUNTER — TELEPHONE (OUTPATIENT)
Age: 79
End: 2024-03-13
Payer: MEDICARE

## 2024-03-13 NOTE — TELEPHONE ENCOUNTER
Call returned to the patient.  I was unable to reach her but I did leave a message for her to contact me at the office.  Patient was concerned that her insurance will not cover for her to go to a subacute rehab postoperatively but wanted to know about in-home care.  I did advise her that home health would be set up for home PT however they are not there but 3 times a week.  If she does indeed need in-home care 24 hours a day have asked her to contact me to discuss agencies.  I also advised her that the cost for in-home care is out-of-pocket

## 2024-03-13 NOTE — TELEPHONE ENCOUNTER
This patient follows with you in the Heart Failure Clinic.     Pt has a defibrillator and after reviewing her most recent report, it appears that her OptiVol is elevated starting on 1/26/24.     Her TriageHF Risk Status is also High d/t elevated OptiVol, patient activity, % ventricular pacing, and heart rate variability.     She does have a fractured LV lead so unsure if these readings are truly accurate, but I just wanted to make you aware of this.

## 2024-04-19 ENCOUNTER — PRE-ADMISSION TESTING (OUTPATIENT)
Dept: PREADMISSION TESTING | Facility: HOSPITAL | Age: 79
End: 2024-04-19
Payer: MEDICARE

## 2024-04-19 VITALS
OXYGEN SATURATION: 95 % | RESPIRATION RATE: 16 BRPM | WEIGHT: 155.7 LBS | SYSTOLIC BLOOD PRESSURE: 118 MMHG | HEART RATE: 73 BPM | HEIGHT: 61 IN | BODY MASS INDEX: 29.39 KG/M2 | DIASTOLIC BLOOD PRESSURE: 74 MMHG | TEMPERATURE: 97.6 F

## 2024-04-19 LAB
ANION GAP SERPL CALCULATED.3IONS-SCNC: 9.7 MMOL/L (ref 5–15)
BUN SERPL-MCNC: 17 MG/DL (ref 8–23)
BUN/CREAT SERPL: 16.2 (ref 7–25)
CALCIUM SPEC-SCNC: 8.8 MG/DL (ref 8.6–10.5)
CHLORIDE SERPL-SCNC: 102 MMOL/L (ref 98–107)
CO2 SERPL-SCNC: 24.3 MMOL/L (ref 22–29)
CREAT SERPL-MCNC: 1.05 MG/DL (ref 0.57–1)
DEPRECATED RDW RBC AUTO: 45.1 FL (ref 37–54)
EGFRCR SERPLBLD CKD-EPI 2021: 54.8 ML/MIN/1.73
ERYTHROCYTE [DISTWIDTH] IN BLOOD BY AUTOMATED COUNT: 12.7 % (ref 12.3–15.4)
GLUCOSE SERPL-MCNC: 94 MG/DL (ref 65–99)
HCT VFR BLD AUTO: 36.2 % (ref 34–46.6)
HGB BLD-MCNC: 12.3 G/DL (ref 12–15.9)
MCH RBC QN AUTO: 33.2 PG (ref 26.6–33)
MCHC RBC AUTO-ENTMCNC: 34 G/DL (ref 31.5–35.7)
MCV RBC AUTO: 97.8 FL (ref 79–97)
PLATELET # BLD AUTO: 236 10*3/MM3 (ref 140–450)
PMV BLD AUTO: 9.5 FL (ref 6–12)
POTASSIUM SERPL-SCNC: 4.5 MMOL/L (ref 3.5–5.2)
QT INTERVAL: 471 MS
QTC INTERVAL: 512 MS
RBC # BLD AUTO: 3.7 10*6/MM3 (ref 3.77–5.28)
SODIUM SERPL-SCNC: 136 MMOL/L (ref 136–145)
WBC NRBC COR # BLD AUTO: 5.4 10*3/MM3 (ref 3.4–10.8)

## 2024-04-19 PROCEDURE — 85027 COMPLETE CBC AUTOMATED: CPT

## 2024-04-19 PROCEDURE — 80048 BASIC METABOLIC PNL TOTAL CA: CPT

## 2024-04-19 PROCEDURE — 36415 COLL VENOUS BLD VENIPUNCTURE: CPT

## 2024-04-19 PROCEDURE — 93005 ELECTROCARDIOGRAM TRACING: CPT

## 2024-04-19 PROCEDURE — 93010 ELECTROCARDIOGRAM REPORT: CPT | Performed by: INTERNAL MEDICINE

## 2024-04-19 RX ORDER — ORAL SEMAGLUTIDE 14 MG/1
14 TABLET ORAL DAILY
COMMUNITY

## 2024-04-19 NOTE — DISCHARGE INSTRUCTIONS
"Holding GLP-1 Receptor Agonists Prior to Anesthesia    In order for safe anesthesia, GLP-1 receptor agonist mediations need to be held before the procedure.     What are GLP-1 Receptor Agonists?  Glucagon-like peptide-1 (GLP-1) receptor agonists are approved by the Food and Drug Administration for treatment of type 2 diabetes mellitus and cardiovascular risk reduction. In addition, GLP-1 receptor agonists are also used for weight loss.     Which of my medications are GLP-1 Receptor Agonists?   Exanetide (Byetta®, Bydureon®)  Lixisenatide (Adlyxin®, Soliqua®)  Semaglutide (Wegovy®, Ozempic®, Rybelsus®)  Liraglutide (Saxenda®, Victoza®, Xutolphy®)  Dulaglutide (Trulicity®)   Tirzepatide (Mounjaro®, Zepbound®)    How can GLP-1 Receptor Agonists cause problems during surgery?  GLP-1 agonists can potentially cause adverse gastrointestinal effects, including the consequences of delayed gastric emptying. This can cause the stomach to be full even after refraining from eating and drinking before surgery and can cause regurgitation or \"throwing up\" of the stomach contents during anesthesia. If the contents enter the airway and the lungs, it could cause anesthesia complications and a severe pneumonia.     What should I do prior to my procedure?  According to the recommendations of the American Society of Anesthesiologists:    If you take GLP-1 agonists every day: Hold the medication on the day of the procedure/surgery.   If you take GLP-1 agonists once a week: Hold the medication a week prior to the procedure/surgery    What if I have questions?  If you have concerns or questions about holding your medications prior to your procedure, contact your doctors before stopping your medications.    Modified from the American Society of Anesthesiologists Consensus-Based Guidance on Preoperative Management of Patients (Adults and Children) on Glucagon-Like Peptide-1 (GLP-1) Receptor Agonists June 29,2023  "

## 2024-04-25 ENCOUNTER — OFFICE VISIT (OUTPATIENT)
Dept: ORTHOPEDIC SURGERY | Facility: CLINIC | Age: 79
End: 2024-04-25
Payer: MEDICARE

## 2024-04-25 VITALS
SYSTOLIC BLOOD PRESSURE: 118 MMHG | HEIGHT: 61 IN | BODY MASS INDEX: 29 KG/M2 | TEMPERATURE: 96.9 F | DIASTOLIC BLOOD PRESSURE: 72 MMHG | WEIGHT: 153.6 LBS

## 2024-04-25 DIAGNOSIS — M17.12 PRIMARY OSTEOARTHRITIS OF LEFT KNEE: Primary | ICD-10-CM

## 2024-04-25 NOTE — H&P (VIEW-ONLY)
Patient: Olivia Addison    Date of Admission: 5/1/2024    YOB: 1945    Medical Record Number: [id number]    Admitting Physician: Dr. Greyson Mccrary    Reason for Admission: End Stage Left Knee OA    History of Present Illness: 78 y.o. female presents with severe end stage knee osteoarthritis which has not been responsive to the full compliment of conservative measures. Despite conservative attempts, there is still severe, constant activity-limiting pain. Given the severity of the pain, the patient has elected to proceed with knee replacement.    Allergies:   Allergies   Allergen Reactions    Elemental Sulfur Rash    Albuterol Anxiety    Pantoprazole GI Intolerance    Sulfa Antibiotics Itching and Rash     Hands turned red w/a rash         Current Medications:  Home Medications:    Current Outpatient Medications on File Prior to Visit   Medication Sig    acyclovir (ZOVIRAX) 400 MG tablet Take 1 tablet by mouth 2 (Two) Times a Day. Take no more than 5 doses a day.    atorvastatin (LIPITOR) 40 MG tablet Take 1 tablet by mouth Daily.    buPROPion XL (WELLBUTRIN XL) 300 MG 24 hr tablet Take 1 tablet by mouth Daily.    carvedilol (COREG) 25 MG tablet Take 1 tablet by mouth 2 (Two) Times a Day.    furosemide (LASIX) 20 MG tablet Take 1 tablet by mouth Every Other Day.    PARoxetine (PAXIL) 20 MG tablet Take 1 tablet by mouth Every Morning.    sacubitril-valsartan (ENTRESTO) 24-26 MG tablet Take 2 tablets by mouth 2 (Two) Times a Day.    Semaglutide (Rybelsus) 14 MG tablet Take 1 tablet by mouth Daily. HOLD AM OF SURGERY    spironolactone (ALDACTONE) 25 MG tablet TAKE 1 TABLET BY MOUTH EVERY DAY (Patient taking differently: Take 1 tablet by mouth Every Other Day.)    TURMERIC PO Take 1 tablet by mouth Daily. HOLD FOR SURGERY    VITAMIN D PO Take 1 tablet by mouth Daily. HOLD FOR SURGERY     No current facility-administered medications on file prior to visit.     PRN Meds:.    PMH:     Past Medical History:    Diagnosis Date    ADHD (attention deficit hyperactivity disorder)     Adjustment disorder with anxiety     Allergic rhinitis     Anesthesia complication     SEVERE ITCHING WITH EPIDURAL    Anxiety and depression     Arthritis     Atherosclerosis     Atrial fibrillation     HX    Atrophic vaginitis     Blepharitis of both eyes     CAD (coronary artery disease)     Cardiomyopathy     NON ISCHEMIC     Cataract     CHF (congestive heart failure)     CHRONIC SYSTOLIC    CKD (chronic kidney disease) stage 3, GFR 30-59 ml/min     DDD (degenerative disc disease), cervical     Diaphoresis 05/23/2019    SEEN AT [facility]    Diverticulitis 09/02/2019    SEEN AT [facility] ER    Diverticulitis 11/15/2016    Diverticulitis 09/07/2014    Diverticulitis     Diverticulitis     Diverticulitis large intestine 05/23/2017    Diverticulitis of colon 08/22/2019    ADMITTED TO [facility]    Dry eye syndrome     DVT (deep venous thrombosis)     RIGHT LEFT POST HEART SURGERY    Dyspnea     Dysuria 06/2019    Esophageal injury     ESTRELLITA-BISWAS SYNDROME    Fatigue     Heart failure     Hematuria 08/2019    History of COVID-19     History of frequent urinary tract infections     History of mitral valve repair     History of posttraumatic stress disorder (PTSD)     History of sepsis     FROM UTI    History of sepsis     History of transfusion 2012    Hypercholesterolemia     Hyperlipidemia     Hypertension     Kidney cysts 07/2017    FOLLOWED BY DR. RITU AMOS    LBBB (left bundle branch block)     Estrellita-Biswas syndrome     TEAR IN ESOPHAGUS AFTER HEART SURGERY    Memory loss     NSVT (nonsustained ventricular tachycardia)     Osteoarthritis of left knee     Osteoporosis     Patent foramen ovale     Pneumonia 05/25/2017    Presbyopia     Presence of cardiac defibrillator     PTSD (post-traumatic stress disorder)     Reactive airway disease     Regular astigmatism     RLS (restless legs syndrome)     S/P TVR (tricuspid valve repair)     Seborrheic  keratosis     FOLLOWED BY DR. HARMAN CÁRDENAS    Segmental and somatic dysfunction of cervical region     Segmental and somatic dysfunction of thoracic region     Sleep apnea     NO LONGER USES MACHINE    SOB (shortness of breath) on exertion     R/T HEART    Vitamin D deficiency     VT (ventricular tachycardia)        PF/Surg/Soc Hx:     Past Surgical History:   Procedure Laterality Date    APPENDECTOMY N/A     BIVENTRICULLAR IMPLANTABLE CARDIOVERTER DEFIBRILLATOR PLACEMENT N/A 04/10/2012    [employer], BI-V, DR. MICKEY MORALES AT [facility]    BLADDER SUSPENSION N/A     BLEPHAROPLASTY Bilateral 03/03/2022    Procedure: BILATERAL UPPER LID BLEPHAROPLASTY;  Surgeon: Edu Power MD;  Location: [facility];  Service: Ophthalmology;  Laterality: Bilateral;    BLEPHAROPLASTY Bilateral 03/03/2022    Procedure: BILATERAL LOWER LID BLEPHAROPLASTY;  Surgeon: Edu Power MD;  Location:  [facility];  Service: New Image;  Laterality: Bilateral;    BREAST CYST ASPIRATION      BREAST SURGERY Bilateral     REDUCTION MAMMAPLASTY    BROW LIFT Bilateral 03/03/2022    Procedure: BILATERAL LATERAL BROWPEXY;  Surgeon: Edu Power MD;  Location: [facility] OR [facility];  Service: Ophthalmology;  Laterality: Bilateral;    CARDIAC CATHETERIZATION Bilateral 03/19/2012    Normal coronary arteries, severe mitral regurgitation and aortic regurgitation. EF was about 30-35%.; DR. JYOTI ROMERO AT [facility]    CARDIAC ELECTROPHYSIOLOGY PROCEDURE Left 07/11/2016    Procedure: PPM battery change [employer];  Surgeon: Hunter Faust MD;  Location: [facility] CATH INVASIVE LOCATION;  Service:     CATARACT EXTRACTION Bilateral     COLON SURGERY  2019    DR ROCK AT [facility]    COLONOSCOPY N/A 10/14/2014    PANDIVERTICULOSIS IN ENTIRE COLON, DR. BRANDEN RUIZ AT [facility]    COLONOSCOPY N/A 11/13/2019    SCATTERED SMALL AND LARGE DIVERTICULA IN ENTIRE COLON, RESCOPE IN 10 YRS, DR. PERCY MUHAMMAD AT [facility]    ENDOSCOPY N/A 06/06/2012     CHAD-RAMOS TEAR WITH ACTIVE BLEEDINGBLOOD THROUGHOUT STOMACH, BLOOD IN DUODENUM, DR. TARUN GILLIS AT [facility]    ESOPHAGUS SURGERY  06/2012    TEAR DURING HEART SURGERY, AT BOTTOM OF ESOPHAGUS    HYSTERECTOMY N/A 1980    OLIVIA WITH BSO    IMPLANTABLE CARDIOVERTER DEFIBRILLATOR LEAD REPLACEMENT/POCKET REVISION N/A 08/28/2012    DR. GISELLE BOOGIE AT [facility]    MITRAL VALVE REPAIR/REPLACEMENT N/A 06/06/2012    REMODELING  ANULOPLASTY USING 24 MM BARAJAS ANULOPLASTY RING, MODEL 5200, SERIAL # [id number], DR. VINCE ZAMUDIO AT [facility]    PATENT FORAMEN OVALE CLOSURE N/A 06/06/2012    DR. VINCE ZAMUDIO AT [facility]    TONSILLECTOMY Bilateral     TRICUSPID VALVE SURGERY N/A 06/06/2012    REMODELING ANULOPLASTY USING 26 MM BARAJAS ANULOPLASTY RING, MODEL 6200, SERIAL # [id number], DR. VINCE ZAMUDIO AT [facility]    VAGINAL DELIVERY N/A 1974        Social History     Occupational History    Not on file   Tobacco Use    Smoking status: Never    Smokeless tobacco: Never   Vaping Use    Vaping status: Never Used   Substance and Sexual Activity    Alcohol use: Yes     Comment: WINE OCCASIONAL    Drug use: No    Sexual activity: Defer     Birth control/protection: Post-menopausal, Surgical      Social History     Social History Narrative    Not on file        Family History   Problem Relation Age of Onset    Diabetes Mother     Hypertension Mother     Emphysema Father     Anxiety disorder Father     Depression Father     Hypertension Father     Stroke Sister     No Known Problems Brother     Heart attack Maternal Grandmother     Sleep apnea Maternal Grandmother     No Known Problems Maternal Grandfather     No Known Problems Paternal Grandmother     No Known Problems Paternal Grandfather     No Known Problems Brother     No Known Problems Daughter     Malig Hyperthermia Neg Hx          Review of Systems:   A 14 point review of systems was performed, pertinent positives discussed above, all other systems are negative    Physical Exam: 78 y.o.  "female  Vital Signs :   Vitals:    04/25/24 1316   BP: 118/72   BP Location: Right arm   Patient Position: Sitting   Cuff Size: Large Adult   Temp: 96.9 °F (36.1 °C)   TempSrc: Temporal   Weight: 69.7 kg (153 lb 9.6 oz)   Height: 153.8 cm (60.55\")   PainSc: 0-No pain   PainLoc: Knee     General: Alert and Oriented x 3, No acute distress.  Psych: mood and affect appropriate; recent and remote memory intact  Eyes: conjunctivae clear; pupils equally round and reactive, sclerae anicteric  CV: no peripheral edema  Resp: normal respiratory effort  Skin: no rashes or wounds; normal turgor  Musculosketetal; pain and crepitance with knee range of motion  Vascular: palpable distal pulses    Xrays:  -3 views (AP, lateral, and sunrise) were reviewed demonstrating end-stage OA with bone on bone articulation.  -A full length AP xray was ordered and reviewed today for purposes of operative alignment demonstrating end stage arthritic findings. There are no previous full length films for review    Assessment:  End-stage Left knee osteoarthritis. Conservative measures have failed.      Plan:  The plan is to proceed with Left Total Knee Replacement. The patient voiced understanding of the risks, benefits, and alternative forms of treatment that were discussed with Dr Mccrary at the time of scheduling.  23-hour home health, history of DVT, we will prescribe Xarelto postoperatively for 6 weeks, creatinine elevated so no NSAIDs    Joy Guillermo, APRN  4/25/2024         "

## 2024-04-25 NOTE — H&P
Patient: Olivia Addison    Date of Admission: 5/1/2024    YOB: 1945    Medical Record Number: [id number]    Admitting Physician: Dr. Greyson Mccrary    Reason for Admission: End Stage Left Knee OA    History of Present Illness: 78 y.o. female presents with severe end stage knee osteoarthritis which has not been responsive to the full compliment of conservative measures. Despite conservative attempts, there is still severe, constant activity-limiting pain. Given the severity of the pain, the patient has elected to proceed with knee replacement.    Allergies:   Allergies   Allergen Reactions    Elemental Sulfur Rash    Albuterol Anxiety    Pantoprazole GI Intolerance    Sulfa Antibiotics Itching and Rash     Hands turned red w/a rash         Current Medications:  Home Medications:    Current Outpatient Medications on File Prior to Visit   Medication Sig    acyclovir (ZOVIRAX) 400 MG tablet Take 1 tablet by mouth 2 (Two) Times a Day. Take no more than 5 doses a day.    atorvastatin (LIPITOR) 40 MG tablet Take 1 tablet by mouth Daily.    buPROPion XL (WELLBUTRIN XL) 300 MG 24 hr tablet Take 1 tablet by mouth Daily.    carvedilol (COREG) 25 MG tablet Take 1 tablet by mouth 2 (Two) Times a Day.    furosemide (LASIX) 20 MG tablet Take 1 tablet by mouth Every Other Day.    PARoxetine (PAXIL) 20 MG tablet Take 1 tablet by mouth Every Morning.    sacubitril-valsartan (ENTRESTO) 24-26 MG tablet Take 2 tablets by mouth 2 (Two) Times a Day.    Semaglutide (Rybelsus) 14 MG tablet Take 1 tablet by mouth Daily. HOLD AM OF SURGERY    spironolactone (ALDACTONE) 25 MG tablet TAKE 1 TABLET BY MOUTH EVERY DAY (Patient taking differently: Take 1 tablet by mouth Every Other Day.)    TURMERIC PO Take 1 tablet by mouth Daily. HOLD FOR SURGERY    VITAMIN D PO Take 1 tablet by mouth Daily. HOLD FOR SURGERY     No current facility-administered medications on file prior to visit.     PRN Meds:.    PMH:     Past Medical History:    Diagnosis Date    ADHD (attention deficit hyperactivity disorder)     Adjustment disorder with anxiety     Allergic rhinitis     Anesthesia complication     SEVERE ITCHING WITH EPIDURAL    Anxiety and depression     Arthritis     Atherosclerosis     Atrial fibrillation     HX    Atrophic vaginitis     Blepharitis of both eyes     CAD (coronary artery disease)     Cardiomyopathy     NON ISCHEMIC     Cataract     CHF (congestive heart failure)     CHRONIC SYSTOLIC    CKD (chronic kidney disease) stage 3, GFR 30-59 ml/min     DDD (degenerative disc disease), cervical     Diaphoresis 05/23/2019    SEEN AT [facility]    Diverticulitis 09/02/2019    SEEN AT [facility] ER    Diverticulitis 11/15/2016    Diverticulitis 09/07/2014    Diverticulitis     Diverticulitis     Diverticulitis large intestine 05/23/2017    Diverticulitis of colon 08/22/2019    ADMITTED TO [facility]    Dry eye syndrome     DVT (deep venous thrombosis)     RIGHT LEFT POST HEART SURGERY    Dyspnea     Dysuria 06/2019    Esophageal injury     ESTRELLITA-BISWAS SYNDROME    Fatigue     Heart failure     Hematuria 08/2019    History of COVID-19     History of frequent urinary tract infections     History of mitral valve repair     History of posttraumatic stress disorder (PTSD)     History of sepsis     FROM UTI    History of sepsis     History of transfusion 2012    Hypercholesterolemia     Hyperlipidemia     Hypertension     Kidney cysts 07/2017    FOLLOWED BY DR. RITU AMOS    LBBB (left bundle branch block)     Estrellita-Biswas syndrome     TEAR IN ESOPHAGUS AFTER HEART SURGERY    Memory loss     NSVT (nonsustained ventricular tachycardia)     Osteoarthritis of left knee     Osteoporosis     Patent foramen ovale     Pneumonia 05/25/2017    Presbyopia     Presence of cardiac defibrillator     PTSD (post-traumatic stress disorder)     Reactive airway disease     Regular astigmatism     RLS (restless legs syndrome)     S/P TVR (tricuspid valve repair)     Seborrheic  keratosis     FOLLOWED BY DR. HARMAN CÁRDENAS    Segmental and somatic dysfunction of cervical region     Segmental and somatic dysfunction of thoracic region     Sleep apnea     NO LONGER USES MACHINE    SOB (shortness of breath) on exertion     R/T HEART    Vitamin D deficiency     VT (ventricular tachycardia)        PF/Surg/Soc Hx:     Past Surgical History:   Procedure Laterality Date    APPENDECTOMY N/A     BIVENTRICULLAR IMPLANTABLE CARDIOVERTER DEFIBRILLATOR PLACEMENT N/A 04/10/2012    MEDTRONIC, BI-V, DR. MICKEY MORALES AT [facility]    BLADDER SUSPENSION N/A     BLEPHAROPLASTY Bilateral 03/03/2022    Procedure: BILATERAL UPPER LID BLEPHAROPLASTY;  Surgeon: Edu Power MD;  Location: [facility] OR [facility];  Service: Ophthalmology;  Laterality: Bilateral;    BLEPHAROPLASTY Bilateral 03/03/2022    Procedure: BILATERAL LOWER LID BLEPHAROPLASTY;  Surgeon: Edu Power MD;  Location:  [facility];  Service: New Image;  Laterality: Bilateral;    BREAST CYST ASPIRATION      BREAST SURGERY Bilateral     REDUCTION MAMMAPLASTY    BROW LIFT Bilateral 03/03/2022    Procedure: BILATERAL LATERAL BROWPEXY;  Surgeon: Edu Power MD;  Location: [facility] OR [facility];  Service: Ophthalmology;  Laterality: Bilateral;    CARDIAC CATHETERIZATION Bilateral 03/19/2012    Normal coronary arteries, severe mitral regurgitation and aortic regurgitation. EF was about 30-35%.; DR. JYOTI ROMERO AT [facility]    CARDIAC ELECTROPHYSIOLOGY PROCEDURE Left 07/11/2016    Procedure: PPM battery change [employer]TRONIC;  Surgeon: Hunter Faust MD;  Location: [facility] CATH INVASIVE LOCATION;  Service:     CATARACT EXTRACTION Bilateral     COLON SURGERY  2019    DR ROCK AT [facility]    COLONOSCOPY N/A 10/14/2014    PANDIVERTICULOSIS IN ENTIRE COLON, DR. BRANDEN RUIZ AT [facility]    COLONOSCOPY N/A 11/13/2019    SCATTERED SMALL AND LARGE DIVERTICULA IN ENTIRE COLON, RESCOPE IN 10 YRS, DR. PERCY MUHAMMAD AT [facility]    ENDOSCOPY N/A 06/06/2012     CHAD-RAMOS TEAR WITH ACTIVE BLEEDINGBLOOD THROUGHOUT STOMACH, BLOOD IN DUODENUM, DR. TARUN GILLIS AT [facility]    ESOPHAGUS SURGERY  06/2012    TEAR DURING HEART SURGERY, AT BOTTOM OF ESOPHAGUS    HYSTERECTOMY N/A 1980    OLIVIA WITH BSO    IMPLANTABLE CARDIOVERTER DEFIBRILLATOR LEAD REPLACEMENT/POCKET REVISION N/A 08/28/2012    DR. GISELLE BOOGIE AT [facility]    MITRAL VALVE REPAIR/REPLACEMENT N/A 06/06/2012    REMODELING  ANULOPLASTY USING 24 MM BARAJAS ANULOPLASTY RING, MODEL 5200, SERIAL # [id number], DR. VINCE ZAMUDIO AT [facility]    PATENT FORAMEN OVALE CLOSURE N/A 06/06/2012    DR. VINCE ZAMUDIO AT [facility]    TONSILLECTOMY Bilateral     TRICUSPID VALVE SURGERY N/A 06/06/2012    REMODELING ANULOPLASTY USING 26 MM BARAJAS ANULOPLASTY RING, MODEL 6200, SERIAL # [id number], DR. VINCE ZAMUDIO AT [facility]    VAGINAL DELIVERY N/A 1974        Social History     Occupational History    Not on file   Tobacco Use    Smoking status: Never    Smokeless tobacco: Never   Vaping Use    Vaping status: Never Used   Substance and Sexual Activity    Alcohol use: Yes     Comment: WINE OCCASIONAL    Drug use: No    Sexual activity: Defer     Birth control/protection: Post-menopausal, Surgical      Social History     Social History Narrative    Not on file        Family History   Problem Relation Age of Onset    Diabetes Mother     Hypertension Mother     Emphysema Father     Anxiety disorder Father     Depression Father     Hypertension Father     Stroke Sister     No Known Problems Brother     Heart attack Maternal Grandmother     Sleep apnea Maternal Grandmother     No Known Problems Maternal Grandfather     No Known Problems Paternal Grandmother     No Known Problems Paternal Grandfather     No Known Problems Brother     No Known Problems Daughter     Malig Hyperthermia Neg Hx          Review of Systems:   A 14 point review of systems was performed, pertinent positives discussed above, all other systems are negative    Physical Exam: 78 y.o.  "female  Vital Signs :   Vitals:    04/25/24 1316   BP: 118/72   BP Location: Right arm   Patient Position: Sitting   Cuff Size: Large Adult   Temp: 96.9 °F (36.1 °C)   TempSrc: Temporal   Weight: 69.7 kg (153 lb 9.6 oz)   Height: 153.8 cm (60.55\")   PainSc: 0-No pain   PainLoc: Knee     General: Alert and Oriented x 3, No acute distress.  Psych: mood and affect appropriate; recent and remote memory intact  Eyes: conjunctivae clear; pupils equally round and reactive, sclerae anicteric  CV: no peripheral edema  Resp: normal respiratory effort  Skin: no rashes or wounds; normal turgor  Musculosketetal; pain and crepitance with knee range of motion  Vascular: palpable distal pulses    Xrays:  -3 views (AP, lateral, and sunrise) were reviewed demonstrating end-stage OA with bone on bone articulation.  -A full length AP xray was ordered and reviewed today for purposes of operative alignment demonstrating end stage arthritic findings. There are no previous full length films for review    Assessment:  End-stage Left knee osteoarthritis. Conservative measures have failed.      Plan:  The plan is to proceed with Left Total Knee Replacement. The patient voiced understanding of the risks, benefits, and alternative forms of treatment that were discussed with Dr Mccrary at the time of scheduling.  23-hour home health, history of DVT, we will prescribe Xarelto postoperatively for 6 weeks, creatinine elevated so no NSAIDs    Joy Guillermo, APRN  4/25/2024         "

## 2024-04-29 ENCOUNTER — TELEPHONE (OUTPATIENT)
Dept: ORTHOPEDIC SURGERY | Facility: CLINIC | Age: 79
End: 2024-04-29
Payer: MEDICARE

## 2024-04-29 NOTE — TELEPHONE ENCOUNTER
Caller: Olivia Addison    Relationship: Self    Best call back number: [phone]    What is the best time to reach you: ANY     Who are you requesting to speak with (clinical staff, provider,  specific staff member): CLINICAL     Do you know the name of the person who called: YES     What was the call regarding: PATIENT IS NEEDING TO ORDER PLACED FOR A ICE MACHINE AFTER SURGERY 05/01/2024 ON THE LEFT KNEE

## 2024-04-30 NOTE — TELEPHONE ENCOUNTER
Insurance does not cover ice machines therefore she does not need an order.  She can get one from [facility] at any time.

## 2024-05-01 ENCOUNTER — APPOINTMENT (OUTPATIENT)
Dept: GENERAL RADIOLOGY | Facility: HOSPITAL | Age: 79
End: 2024-05-01
Payer: MEDICARE

## 2024-05-01 ENCOUNTER — HOSPITAL ENCOUNTER (OUTPATIENT)
Facility: HOSPITAL | Age: 79
Discharge: HOME HEALTH | End: 2024-05-02
Attending: ORTHOPAEDIC SURGERY | Admitting: ORTHOPAEDIC SURGERY
Payer: MEDICARE

## 2024-05-01 ENCOUNTER — ANESTHESIA (OUTPATIENT)
Dept: PERIOP | Facility: HOSPITAL | Age: 79
End: 2024-05-01
Payer: MEDICARE

## 2024-05-01 ENCOUNTER — ANESTHESIA EVENT (OUTPATIENT)
Dept: PERIOP | Facility: HOSPITAL | Age: 79
End: 2024-05-01
Payer: MEDICARE

## 2024-05-01 DIAGNOSIS — M17.12 PRIMARY OSTEOARTHRITIS OF LEFT KNEE: ICD-10-CM

## 2024-05-01 DIAGNOSIS — Z96.652 S/P TKR (TOTAL KNEE REPLACEMENT), LEFT: Primary | ICD-10-CM

## 2024-05-01 PROBLEM — M17.9 OA (OSTEOARTHRITIS) OF KNEE: Status: ACTIVE | Noted: 2024-05-01

## 2024-05-01 LAB — GLUCOSE BLDC GLUCOMTR-MCNC: 105 MG/DL (ref 70–130)

## 2024-05-01 PROCEDURE — 25010000002 MIDAZOLAM PER 1 MG: Performed by: ANESTHESIOLOGY

## 2024-05-01 PROCEDURE — A9270 NON-COVERED ITEM OR SERVICE: HCPCS | Performed by: NURSE PRACTITIONER

## 2024-05-01 PROCEDURE — 25810000003 SODIUM CHLORIDE 0.9 % SOLUTION 250 ML FLEX CONT: Performed by: NURSE PRACTITIONER

## 2024-05-01 PROCEDURE — 25010000002 FENTANYL CITRATE (PF) 100 MCG/2ML SOLUTION: Performed by: NURSE ANESTHETIST, CERTIFIED REGISTERED

## 2024-05-01 PROCEDURE — C1776 JOINT DEVICE (IMPLANTABLE): HCPCS | Performed by: ORTHOPAEDIC SURGERY

## 2024-05-01 PROCEDURE — G0378 HOSPITAL OBSERVATION PER HR: HCPCS

## 2024-05-01 PROCEDURE — C1713 ANCHOR/SCREW BN/BN,TIS/BN: HCPCS | Performed by: ORTHOPAEDIC SURGERY

## 2024-05-01 PROCEDURE — 25810000003 LACTATED RINGERS PER 1000 ML: Performed by: ANESTHESIOLOGY

## 2024-05-01 PROCEDURE — 25010000002 HYDROMORPHONE PER 4 MG: Performed by: NURSE ANESTHETIST, CERTIFIED REGISTERED

## 2024-05-01 PROCEDURE — 27447 TOTAL KNEE ARTHROPLASTY: CPT | Performed by: NURSE PRACTITIONER

## 2024-05-01 PROCEDURE — 25010000002 ROPIVACAINE PER 1 MG: Performed by: ANESTHESIOLOGY

## 2024-05-01 PROCEDURE — 25010000002 CEFAZOLIN PER 500 MG: Performed by: NURSE PRACTITIONER

## 2024-05-01 PROCEDURE — 25810000003 LACTATED RINGERS SOLUTION: Performed by: NURSE PRACTITIONER

## 2024-05-01 PROCEDURE — 63710000001 SACUBITRIL-VALSARTAN 24-26 MG TABLET: Performed by: NURSE PRACTITIONER

## 2024-05-01 PROCEDURE — 25010000002 SUGAMMADEX 200 MG/2ML SOLUTION: Performed by: NURSE ANESTHETIST, CERTIFIED REGISTERED

## 2024-05-01 PROCEDURE — 25010000002 VANCOMYCIN 1 G RECONSTITUTED SOLUTION 1 EACH VIAL: Performed by: NURSE PRACTITIONER

## 2024-05-01 PROCEDURE — 27447 TOTAL KNEE ARTHROPLASTY: CPT | Performed by: ORTHOPAEDIC SURGERY

## 2024-05-01 PROCEDURE — 82948 REAGENT STRIP/BLOOD GLUCOSE: CPT

## 2024-05-01 PROCEDURE — 25010000002 DEXAMETHASONE SODIUM PHOSPHATE 20 MG/5ML SOLUTION: Performed by: ANESTHESIOLOGY

## 2024-05-01 PROCEDURE — 25010000002 ACETAMINOPHEN 10 MG/ML SOLUTION: Performed by: NURSE ANESTHETIST, CERTIFIED REGISTERED

## 2024-05-01 PROCEDURE — C9290 INJ, BUPIVACAINE LIPOSOME: HCPCS | Performed by: ORTHOPAEDIC SURGERY

## 2024-05-01 PROCEDURE — 25010000002 PROPOFOL 200 MG/20ML EMULSION: Performed by: NURSE ANESTHETIST, CERTIFIED REGISTERED

## 2024-05-01 PROCEDURE — 25010000002 FENTANYL CITRATE (PF) 50 MCG/ML SOLUTION: Performed by: ANESTHESIOLOGY

## 2024-05-01 PROCEDURE — 63710000001 CARVEDILOL 25 MG TABLET: Performed by: NURSE PRACTITIONER

## 2024-05-01 PROCEDURE — 25010000002 DEXAMETHASONE PER 1 MG: Performed by: ANESTHESIOLOGY

## 2024-05-01 PROCEDURE — 63710000001 HYDROCODONE-ACETAMINOPHEN 7.5-325 MG TABLET: Performed by: NURSE PRACTITIONER

## 2024-05-01 PROCEDURE — 73560 X-RAY EXAM OF KNEE 1 OR 2: CPT

## 2024-05-01 PROCEDURE — 0 BUPIVACAINE LIPOSOME 1.3 % SUSPENSION 20 ML VIAL: Performed by: ORTHOPAEDIC SURGERY

## 2024-05-01 DEVICE — IMPLANTABLE DEVICE: Type: IMPLANTABLE DEVICE | Status: FUNCTIONAL

## 2024-05-01 DEVICE — KNOTLESS TISSUE CONTROL DEVICE, UNDYED UNIDIRECTIONAL (ANTIBACTERIAL) SYNTHETIC ABSORBABLE DEVICE
Type: IMPLANTABLE DEVICE | Site: KNEE | Status: FUNCTIONAL
Brand: STRATAFIX

## 2024-05-01 DEVICE — VIOLET ANTIBACTERIAL POLYDIOXANONE, KNOTLESS TISSUE CONTROL DEVICE
Type: IMPLANTABLE DEVICE | Site: KNEE | Status: FUNCTIONAL
Brand: STRATAFIX

## 2024-05-01 DEVICE — PALACOS® R IS A FAST-CURING, RADIOPAQUE, POLY(METHYL METHACRYLATE)-BASED BONE CEMENT.PALACOS ® R CONTAINS THE X-RAY CONTRAST MEDIUM ZIRCONIUM DIOXIDE. TO IMPROVE VISIBILITY IN THE SURGICAL FIELD PALACOS ® R HAS BEEN COLOURED WITH CHLOROPHYLL (E141). THE BONE CEMENT IS PREPARED DIRECTLY BEFORE USE BY MIXING A POLYMER POWDER COMPONENT WITH A LIQUID MONOMER COMPONENT. A DUCTILE DOUGH FORMS WHICH CURES WITHIN A FEW MINUTES.
Type: IMPLANTABLE DEVICE | Site: KNEE | Status: FUNCTIONAL
Brand: PALACOS®

## 2024-05-01 DEVICE — GENESIS II BICONVEX PATELLA 26MM
Type: IMPLANTABLE DEVICE | Site: KNEE | Status: FUNCTIONAL
Brand: GENESIS II

## 2024-05-01 DEVICE — GENESIS II NON-POROUS TIBIAL                                    BASEPLATE SIZE 3 LEFT
Type: IMPLANTABLE DEVICE | Site: KNEE | Status: FUNCTIONAL
Brand: GENESIS II

## 2024-05-01 DEVICE — LEGION CRUCIATE RETAINING OXINIUM                                    FEMORAL SIZE 4 LEFT
Type: IMPLANTABLE DEVICE | Site: KNEE | Status: FUNCTIONAL
Brand: LEGION

## 2024-05-01 DEVICE — LEGION HIGHLY CROSS LINKED                                    POLYETHYLENE DISHED INSERT SIZE 3-4 11MM
Type: IMPLANTABLE DEVICE | Site: KNEE | Status: FUNCTIONAL
Brand: LEGION

## 2024-05-01 RX ORDER — HYDROCODONE BITARTRATE AND ACETAMINOPHEN 7.5; 325 MG/1; MG/1
1 TABLET ORAL EVERY 4 HOURS PRN
Status: DISCONTINUED | OUTPATIENT
Start: 2024-05-01 | End: 2024-05-01 | Stop reason: HOSPADM

## 2024-05-01 RX ORDER — PAROXETINE HYDROCHLORIDE 20 MG/1
20 TABLET, FILM COATED ORAL EVERY MORNING
Status: DISCONTINUED | OUTPATIENT
Start: 2024-05-02 | End: 2024-05-02 | Stop reason: HOSPADM

## 2024-05-01 RX ORDER — EPHEDRINE SULFATE 50 MG/ML
5 INJECTION, SOLUTION INTRAVENOUS ONCE AS NEEDED
Status: DISCONTINUED | OUTPATIENT
Start: 2024-05-01 | End: 2024-05-01 | Stop reason: HOSPADM

## 2024-05-01 RX ORDER — LIDOCAINE HYDROCHLORIDE 10 MG/ML
0.5 INJECTION, SOLUTION INFILTRATION; PERINEURAL ONCE AS NEEDED
Status: DISCONTINUED | OUTPATIENT
Start: 2024-05-01 | End: 2024-05-01 | Stop reason: HOSPADM

## 2024-05-01 RX ORDER — SODIUM CHLORIDE 0.9 % (FLUSH) 0.9 %
3-10 SYRINGE (ML) INJECTION AS NEEDED
Status: DISCONTINUED | OUTPATIENT
Start: 2024-05-01 | End: 2024-05-01 | Stop reason: HOSPADM

## 2024-05-01 RX ORDER — MIDAZOLAM HYDROCHLORIDE 1 MG/ML
0.5 INJECTION INTRAMUSCULAR; INTRAVENOUS
Status: DISCONTINUED | OUTPATIENT
Start: 2024-05-01 | End: 2024-05-01 | Stop reason: HOSPADM

## 2024-05-01 RX ORDER — ONDANSETRON 4 MG/1
4 TABLET, ORALLY DISINTEGRATING ORAL EVERY 6 HOURS PRN
Status: DISCONTINUED | OUTPATIENT
Start: 2024-05-01 | End: 2024-05-02 | Stop reason: HOSPADM

## 2024-05-01 RX ORDER — ROPIVACAINE HYDROCHLORIDE 5 MG/ML
INJECTION, SOLUTION EPIDURAL; INFILTRATION; PERINEURAL
Status: COMPLETED | OUTPATIENT
Start: 2024-05-01 | End: 2024-05-01

## 2024-05-01 RX ORDER — SPIRONOLACTONE 25 MG/1
25 TABLET ORAL EVERY OTHER DAY
Status: DISCONTINUED | OUTPATIENT
Start: 2024-05-02 | End: 2024-05-02 | Stop reason: HOSPADM

## 2024-05-01 RX ORDER — FUROSEMIDE 20 MG/1
20 TABLET ORAL EVERY OTHER DAY
Status: DISCONTINUED | OUTPATIENT
Start: 2024-05-01 | End: 2024-05-02 | Stop reason: HOSPADM

## 2024-05-01 RX ORDER — HYDROCODONE BITARTRATE AND ACETAMINOPHEN 7.5; 325 MG/1; MG/1
1 TABLET ORAL EVERY 4 HOURS PRN
Status: DISCONTINUED | OUTPATIENT
Start: 2024-05-01 | End: 2024-05-02 | Stop reason: HOSPADM

## 2024-05-01 RX ORDER — IPRATROPIUM BROMIDE AND ALBUTEROL SULFATE 2.5; .5 MG/3ML; MG/3ML
3 SOLUTION RESPIRATORY (INHALATION) ONCE AS NEEDED
Status: DISCONTINUED | OUTPATIENT
Start: 2024-05-01 | End: 2024-05-01 | Stop reason: HOSPADM

## 2024-05-01 RX ORDER — DROPERIDOL 2.5 MG/ML
0.62 INJECTION, SOLUTION INTRAMUSCULAR; INTRAVENOUS
Status: DISCONTINUED | OUTPATIENT
Start: 2024-05-01 | End: 2024-05-01 | Stop reason: HOSPADM

## 2024-05-01 RX ORDER — HYDROMORPHONE HYDROCHLORIDE 1 MG/ML
0.25 INJECTION, SOLUTION INTRAMUSCULAR; INTRAVENOUS; SUBCUTANEOUS
Status: DISCONTINUED | OUTPATIENT
Start: 2024-05-01 | End: 2024-05-01 | Stop reason: HOSPADM

## 2024-05-01 RX ORDER — PANTOPRAZOLE SODIUM 40 MG/1
40 TABLET, DELAYED RELEASE ORAL DAILY
Qty: 14 TABLET | Refills: 0 | Status: SHIPPED | OUTPATIENT
Start: 2024-05-01 | End: 2024-05-16

## 2024-05-01 RX ORDER — PROMETHAZINE HYDROCHLORIDE 25 MG/1
25 SUPPOSITORY RECTAL ONCE AS NEEDED
Status: DISCONTINUED | OUTPATIENT
Start: 2024-05-01 | End: 2024-05-01 | Stop reason: HOSPADM

## 2024-05-01 RX ORDER — ONDANSETRON 2 MG/ML
4 INJECTION INTRAMUSCULAR; INTRAVENOUS ONCE AS NEEDED
Status: DISCONTINUED | OUTPATIENT
Start: 2024-05-01 | End: 2024-05-02 | Stop reason: HOSPADM

## 2024-05-01 RX ORDER — FAMOTIDINE 10 MG/ML
20 INJECTION, SOLUTION INTRAVENOUS ONCE
Status: COMPLETED | OUTPATIENT
Start: 2024-05-01 | End: 2024-05-01

## 2024-05-01 RX ORDER — ONDANSETRON 4 MG/1
4 TABLET, FILM COATED ORAL EVERY 8 HOURS PRN
Qty: 10 TABLET | Refills: 0 | Status: SHIPPED | OUTPATIENT
Start: 2024-05-01

## 2024-05-01 RX ORDER — HYDRALAZINE HYDROCHLORIDE 20 MG/ML
5 INJECTION INTRAMUSCULAR; INTRAVENOUS
Status: DISCONTINUED | OUTPATIENT
Start: 2024-05-01 | End: 2024-05-01 | Stop reason: HOSPADM

## 2024-05-01 RX ORDER — HYDROCODONE BITARTRATE AND ACETAMINOPHEN 7.5; 325 MG/1; MG/1
1 TABLET ORAL EVERY 4 HOURS PRN
Qty: 40 TABLET | Refills: 0 | Status: SHIPPED | OUTPATIENT
Start: 2024-05-01

## 2024-05-01 RX ORDER — PROPOFOL 10 MG/ML
INJECTION, EMULSION INTRAVENOUS AS NEEDED
Status: DISCONTINUED | OUTPATIENT
Start: 2024-05-01 | End: 2024-05-01 | Stop reason: SURG

## 2024-05-01 RX ORDER — DIPHENHYDRAMINE HYDROCHLORIDE 50 MG/ML
12.5 INJECTION INTRAMUSCULAR; INTRAVENOUS
Status: DISCONTINUED | OUTPATIENT
Start: 2024-05-01 | End: 2024-05-01 | Stop reason: HOSPADM

## 2024-05-01 RX ORDER — FENTANYL CITRATE 50 UG/ML
50 INJECTION, SOLUTION INTRAMUSCULAR; INTRAVENOUS ONCE AS NEEDED
Status: COMPLETED | OUTPATIENT
Start: 2024-05-01 | End: 2024-05-01

## 2024-05-01 RX ORDER — HYDROCODONE BITARTRATE AND ACETAMINOPHEN 7.5; 325 MG/1; MG/1
2 TABLET ORAL EVERY 4 HOURS PRN
Status: DISCONTINUED | OUTPATIENT
Start: 2024-05-01 | End: 2024-05-02 | Stop reason: HOSPADM

## 2024-05-01 RX ORDER — MAGNESIUM HYDROXIDE 1200 MG/15ML
LIQUID ORAL AS NEEDED
Status: DISCONTINUED | OUTPATIENT
Start: 2024-05-01 | End: 2024-05-01 | Stop reason: HOSPADM

## 2024-05-01 RX ORDER — BUPROPION HYDROCHLORIDE 300 MG/1
300 TABLET ORAL DAILY
Status: DISCONTINUED | OUTPATIENT
Start: 2024-05-02 | End: 2024-05-02 | Stop reason: HOSPADM

## 2024-05-01 RX ORDER — FENTANYL CITRATE 50 UG/ML
INJECTION, SOLUTION INTRAMUSCULAR; INTRAVENOUS AS NEEDED
Status: DISCONTINUED | OUTPATIENT
Start: 2024-05-01 | End: 2024-05-01 | Stop reason: SURG

## 2024-05-01 RX ORDER — SODIUM CHLORIDE 0.9 % (FLUSH) 0.9 %
3 SYRINGE (ML) INJECTION EVERY 12 HOURS SCHEDULED
Status: DISCONTINUED | OUTPATIENT
Start: 2024-05-01 | End: 2024-05-01 | Stop reason: HOSPADM

## 2024-05-01 RX ORDER — POLYETHYLENE GLYCOL 3350 17 G/17G
17 POWDER, FOR SOLUTION ORAL 2 TIMES DAILY
Qty: 238 G | Refills: 0 | Status: SHIPPED | OUTPATIENT
Start: 2024-05-01 | End: 2024-05-09

## 2024-05-01 RX ORDER — DEXAMETHASONE SODIUM PHOSPHATE 4 MG/ML
INJECTION, SOLUTION INTRA-ARTICULAR; INTRALESIONAL; INTRAMUSCULAR; INTRAVENOUS; SOFT TISSUE
Status: COMPLETED | OUTPATIENT
Start: 2024-05-01 | End: 2024-05-01

## 2024-05-01 RX ORDER — ACETAMINOPHEN 10 MG/ML
INJECTION, SOLUTION INTRAVENOUS AS NEEDED
Status: DISCONTINUED | OUTPATIENT
Start: 2024-05-01 | End: 2024-05-01 | Stop reason: SURG

## 2024-05-01 RX ORDER — CARVEDILOL 25 MG/1
25 TABLET ORAL 2 TIMES DAILY WITH MEALS
Status: DISCONTINUED | OUTPATIENT
Start: 2024-05-01 | End: 2024-05-02 | Stop reason: HOSPADM

## 2024-05-01 RX ORDER — ONDANSETRON 2 MG/ML
4 INJECTION INTRAMUSCULAR; INTRAVENOUS ONCE AS NEEDED
Status: DISCONTINUED | OUTPATIENT
Start: 2024-05-01 | End: 2024-05-01 | Stop reason: HOSPADM

## 2024-05-01 RX ORDER — HYDROCODONE BITARTRATE AND ACETAMINOPHEN 5; 325 MG/1; MG/1
1 TABLET ORAL ONCE AS NEEDED
Status: DISCONTINUED | OUTPATIENT
Start: 2024-05-01 | End: 2024-05-01 | Stop reason: HOSPADM

## 2024-05-01 RX ORDER — UREA 10 %
1 LOTION (ML) TOPICAL NIGHTLY PRN
Status: DISCONTINUED | OUTPATIENT
Start: 2024-05-01 | End: 2024-05-02 | Stop reason: HOSPADM

## 2024-05-01 RX ORDER — FLUMAZENIL 0.1 MG/ML
0.2 INJECTION INTRAVENOUS AS NEEDED
Status: DISCONTINUED | OUTPATIENT
Start: 2024-05-01 | End: 2024-05-01 | Stop reason: HOSPADM

## 2024-05-01 RX ORDER — PROMETHAZINE HYDROCHLORIDE 12.5 MG/1
12.5 TABLET ORAL EVERY 4 HOURS PRN
Status: DISCONTINUED | OUTPATIENT
Start: 2024-05-01 | End: 2024-05-02 | Stop reason: HOSPADM

## 2024-05-01 RX ORDER — ACETAMINOPHEN 325 MG/1
650 TABLET ORAL EVERY 6 HOURS PRN
Status: DISCONTINUED | OUTPATIENT
Start: 2024-05-01 | End: 2024-05-02 | Stop reason: HOSPADM

## 2024-05-01 RX ORDER — SODIUM CHLORIDE, SODIUM LACTATE, POTASSIUM CHLORIDE, CALCIUM CHLORIDE 600; 310; 30; 20 MG/100ML; MG/100ML; MG/100ML; MG/100ML
9 INJECTION, SOLUTION INTRAVENOUS CONTINUOUS
Status: DISCONTINUED | OUTPATIENT
Start: 2024-05-01 | End: 2024-05-01

## 2024-05-01 RX ORDER — PREGABALIN 75 MG/1
150 CAPSULE ORAL ONCE
Status: COMPLETED | OUTPATIENT
Start: 2024-05-01 | End: 2024-05-01

## 2024-05-01 RX ORDER — ROCURONIUM BROMIDE 10 MG/ML
INJECTION, SOLUTION INTRAVENOUS AS NEEDED
Status: DISCONTINUED | OUTPATIENT
Start: 2024-05-01 | End: 2024-05-01 | Stop reason: SURG

## 2024-05-01 RX ORDER — LABETALOL HYDROCHLORIDE 5 MG/ML
5 INJECTION, SOLUTION INTRAVENOUS
Status: DISCONTINUED | OUTPATIENT
Start: 2024-05-01 | End: 2024-05-01 | Stop reason: HOSPADM

## 2024-05-01 RX ORDER — NALOXONE HCL 0.4 MG/ML
0.2 VIAL (ML) INJECTION AS NEEDED
Status: DISCONTINUED | OUTPATIENT
Start: 2024-05-01 | End: 2024-05-01 | Stop reason: HOSPADM

## 2024-05-01 RX ORDER — PROMETHAZINE HYDROCHLORIDE 25 MG/1
25 TABLET ORAL ONCE AS NEEDED
Status: DISCONTINUED | OUTPATIENT
Start: 2024-05-01 | End: 2024-05-01 | Stop reason: HOSPADM

## 2024-05-01 RX ORDER — FENTANYL CITRATE 50 UG/ML
25 INJECTION, SOLUTION INTRAMUSCULAR; INTRAVENOUS
Status: DISCONTINUED | OUTPATIENT
Start: 2024-05-01 | End: 2024-05-01 | Stop reason: HOSPADM

## 2024-05-01 RX ADMIN — CARVEDILOL 25 MG: 25 TABLET, FILM COATED ORAL at 18:50

## 2024-05-01 RX ADMIN — ROPIVACAINE HYDROCHLORIDE 10 ML: 5 INJECTION EPIDURAL; INFILTRATION; PERINEURAL at 11:56

## 2024-05-01 RX ADMIN — FENTANYL CITRATE 50 MCG: 50 INJECTION, SOLUTION INTRAMUSCULAR; INTRAVENOUS at 13:03

## 2024-05-01 RX ADMIN — SODIUM CHLORIDE, POTASSIUM CHLORIDE, SODIUM LACTATE AND CALCIUM CHLORIDE: 600; 310; 30; 20 INJECTION, SOLUTION INTRAVENOUS at 12:24

## 2024-05-01 RX ADMIN — HYDROCODONE BITARTRATE AND ACETAMINOPHEN 1 TABLET: 7.5; 325 TABLET ORAL at 21:17

## 2024-05-01 RX ADMIN — FENTANYL CITRATE 50 MCG: 50 INJECTION, SOLUTION INTRAMUSCULAR; INTRAVENOUS at 12:27

## 2024-05-01 RX ADMIN — DEXAMETHASONE SODIUM PHOSPHATE 8 MG: 4 INJECTION, SOLUTION INTRA-ARTICULAR; INTRALESIONAL; INTRAMUSCULAR; INTRAVENOUS; SOFT TISSUE at 12:31

## 2024-05-01 RX ADMIN — MIDAZOLAM 1 MG: 1 INJECTION INTRAMUSCULAR; INTRAVENOUS at 11:50

## 2024-05-01 RX ADMIN — VANCOMYCIN HYDROCHLORIDE 1000 MG: 1 INJECTION, POWDER, LYOPHILIZED, FOR SOLUTION INTRAVENOUS at 11:10

## 2024-05-01 RX ADMIN — SUGAMMADEX 200 MG: 100 INJECTION, SOLUTION INTRAVENOUS at 13:40

## 2024-05-01 RX ADMIN — ROCURONIUM BROMIDE 50 MG: 10 INJECTION, SOLUTION INTRAVENOUS at 12:26

## 2024-05-01 RX ADMIN — FENTANYL CITRATE 50 MCG: 50 INJECTION, SOLUTION INTRAMUSCULAR; INTRAVENOUS at 11:50

## 2024-05-01 RX ADMIN — SODIUM CHLORIDE 2 G: 900 INJECTION INTRAVENOUS at 12:16

## 2024-05-01 RX ADMIN — SODIUM CHLORIDE 2 G: 900 INJECTION INTRAVENOUS at 21:05

## 2024-05-01 RX ADMIN — PREGABALIN 150 MG: 75 CAPSULE ORAL at 11:12

## 2024-05-01 RX ADMIN — FAMOTIDINE 20 MG: 10 INJECTION INTRAVENOUS at 11:10

## 2024-05-01 RX ADMIN — PROPOFOL 120 MG: 10 INJECTION, EMULSION INTRAVENOUS at 12:26

## 2024-05-01 RX ADMIN — SACUBITRIL AND VALSARTAN 2 TABLET: 24; 26 TABLET, FILM COATED ORAL at 21:06

## 2024-05-01 RX ADMIN — HYDROMORPHONE HYDROCHLORIDE 0.25 MG: 1 INJECTION, SOLUTION INTRAMUSCULAR; INTRAVENOUS; SUBCUTANEOUS at 15:30

## 2024-05-01 RX ADMIN — DEXAMETHASONE SODIUM PHOSPHATE 4 MG: 4 INJECTION, SOLUTION INTRA-ARTICULAR; INTRALESIONAL; INTRAMUSCULAR; INTRAVENOUS; SOFT TISSUE at 11:56

## 2024-05-01 RX ADMIN — ACETAMINOPHEN 1000 MG: 1000 INJECTION INTRAVENOUS at 12:38

## 2024-05-01 RX ADMIN — SODIUM CHLORIDE, POTASSIUM CHLORIDE, SODIUM LACTATE AND CALCIUM CHLORIDE 500 ML: 600; 310; 30; 20 INJECTION, SOLUTION INTRAVENOUS at 11:11

## 2024-05-01 RX ADMIN — PROPOFOL 140 MCG/KG/MIN: 10 INJECTION, EMULSION INTRAVENOUS at 12:31

## 2024-05-01 NOTE — ANESTHESIA PREPROCEDURE EVALUATION
Anesthesia Evaluation     NPO Solid Status: > 8 hours             Airway   Mallampati: II  TM distance: >3 FB  Neck ROM: full  No difficulty expected  Dental - normal exam     Pulmonary    (+) pneumonia ,  (-) wheezes  Cardiovascular     ECG reviewed  Rhythm: regular    (+) pacemaker ICD, hypertension, CAD, dysrhythmias, CHF Systolic <55%, hyperlipidemia    ROS comment: EF 40-45%    ICD for non ischemic cardiac failure    Neuro/Psych  GI/Hepatic/Renal/Endo    (+) GI bleeding , renal disease- CRI    Musculoskeletal     Abdominal    Substance History   (+) alcohol use     OB/GYN          Other                    Anesthesia Plan    ASA 3     general     (  D/W R&B of GA including but not limited to: heart, lung, liver, kidney, neurologic problems, positioning injuries, dental damage, corneal abrasion and TMJ.  .)  intravenous induction     Anesthetic plan, risks, benefits, and alternatives have been provided, discussed and informed consent has been obtained with: patient.    CODE STATUS:

## 2024-05-01 NOTE — ANESTHESIA PROCEDURE NOTES
Airway  Urgency: elective    Date/Time: 5/1/2024 12:36 PM  Airway not difficult    General Information and Staff    Patient location during procedure: OR  Anesthesiologist: Sterling Ortega MD  CRNA/CAA: Lisa Maria CRNA    Indications and Patient Condition  Indications for airway management: airway protection    Preoxygenated: yes  MILS not maintained throughout  Mask difficulty assessment: 1 - vent by mask    Final Airway Details  Final airway type: endotracheal airway      Successful airway: ETT  Cuffed: yes   Successful intubation technique: direct laryngoscopy  Endotracheal tube insertion site: oral  Blade: Carlita  Blade size: 3  ETT size (mm): 7.0  Cormack-Lehane Classification: grade I - full view of glottis  Placement verified by: chest auscultation and capnometry   Measured from: lips  ETT/EBT  to lips (cm): 22  Number of attempts at approach: 1  Assessment: lips, teeth, and gum same as pre-op and atraumatic intubation    Additional Comments  PreO2 100%, FEO2 >85, SIVI, easy BMV, sniff position, ETT placed/ confirmed, attraumatic, teeth and lips as preop.

## 2024-05-01 NOTE — ANESTHESIA POSTPROCEDURE EVALUATION
Patient: Olivia Addison    Procedure Summary       Date: 05/01/24 Room / Location:  [facility] OR [address] [place] [address] [facility]    Anesthesia Start: 1222 Anesthesia Stop: 1417    Procedure: TOTAL KNEE ARTHROPLASTY (Left: Knee) Diagnosis:       Primary osteoarthritis of left knee      (Primary osteoarthritis of left knee [M17.12])    Surgeons: Greyson Mccrary MD Provider: Sterling Ortega MD    Anesthesia Type: general ASA Status: 3            Anesthesia Type: general    Vitals  Vitals Value Taken Time   /79 05/01/24 1545   Temp 36.9 °C (98.4 °F) 05/01/24 1530   Pulse 71 05/01/24 1556   Resp 15 05/01/24 1530   SpO2 96 % 05/01/24 1556   Vitals shown include unfiled device data.        Anesthesia Post Evaluation

## 2024-05-01 NOTE — ANESTHESIA PROCEDURE NOTES
Peripheral Block    Pre-sedation assessment completed: 5/1/2024 11:50 AM    Patient reassessed immediately prior to procedure    Patient location during procedure: pre-op  Start time: 5/1/2024 11:51 AM  Stop time: 5/1/2024 11:56 AM  Reason for block: at surgeon's request and post-op pain management  Performed by  Anesthesiologist: Sterling Ortega MD  Preanesthetic Checklist  Completed: patient identified, IV checked, site marked, risks and benefits discussed, surgical consent, monitors and equipment checked, pre-op evaluation and timeout performed  Prep:  Sterile barriers:gloves  Prep: ChloraPrep  Patient monitoring: blood pressure monitoring, continuous pulse oximetry and EKG  Procedure    Sedation: yes    Guidance:ultrasound guided    ULTRASOUND INTERPRETATION.  Using ultrasound guidance a 22 G gauge needle was placed in close proximity to the femoral nerve, at which point, under ultrasound guidance anesthetic was injected in the area of the nerve and spread of the anesthesia was seen on ultrasound in close proximity thereto.  There were no abnormalities seen on ultrasound; a digital image was taken; and the patient tolerated the procedure with no complications. Images:still images obtained    Laterality:left  Block Type:adductor canal block  Injection Technique:single-shot  Needle Type:short-bevel  Needle Gauge:22 G      Medications Used: dexamethasone (DECADRON) injection - Injection   4 mg - 5/1/2024 11:56:00 AM  ropivacaine (NAROPIN) 0.5 % injection - Injection   10 mL - 5/1/2024 11:56:00 AM      Medications  Comment:Ultrasound Interpretation:  Using ultrasound guidance the needle was placed in close proximity to the nerve and anesthetic was injected in the area of the nerve and spread of the anesthetic was seen on ultrasound in close proximity thereto.  There were no abnormalities seen on ultrasound; a digital image was taken; and the patient tolerated the procedure with no complications.    .    Post  Assessment  Injection Assessment: negative aspiration for heme, no paresthesia on injection and incremental injection  Patient Tolerance:comfortable throughout block  Complications:no

## 2024-05-02 VITALS
WEIGHT: 153.66 LBS | DIASTOLIC BLOOD PRESSURE: 58 MMHG | BODY MASS INDEX: 29.01 KG/M2 | OXYGEN SATURATION: 96 % | HEART RATE: 81 BPM | SYSTOLIC BLOOD PRESSURE: 88 MMHG | RESPIRATION RATE: 18 BRPM | HEIGHT: 61 IN | TEMPERATURE: 98 F

## 2024-05-02 LAB
HCT VFR BLD AUTO: 32.8 % (ref 34–46.6)
HGB BLD-MCNC: 11.2 G/DL (ref 12–15.9)

## 2024-05-02 PROCEDURE — 85018 HEMOGLOBIN: CPT | Performed by: NURSE PRACTITIONER

## 2024-05-02 PROCEDURE — A9270 NON-COVERED ITEM OR SERVICE: HCPCS | Performed by: NURSE PRACTITIONER

## 2024-05-02 PROCEDURE — 97162 PT EVAL MOD COMPLEX 30 MIN: CPT | Performed by: PHYSICAL THERAPIST

## 2024-05-02 PROCEDURE — 63710000001 PAROXETINE 20 MG TABLET: Performed by: NURSE PRACTITIONER

## 2024-05-02 PROCEDURE — 85014 HEMATOCRIT: CPT | Performed by: NURSE PRACTITIONER

## 2024-05-02 PROCEDURE — 63710000001 HYDROCODONE-ACETAMINOPHEN 7.5-325 MG TABLET: Performed by: NURSE PRACTITIONER

## 2024-05-02 PROCEDURE — 99024 POSTOP FOLLOW-UP VISIT: CPT | Performed by: NURSE PRACTITIONER

## 2024-05-02 PROCEDURE — G0378 HOSPITAL OBSERVATION PER HR: HCPCS

## 2024-05-02 PROCEDURE — 63710000001 BUPROPION XL 300 MG TABLET SUSTAINED-RELEASE 24 HOUR: Performed by: NURSE PRACTITIONER

## 2024-05-02 PROCEDURE — 25010000002 CEFAZOLIN PER 500 MG: Performed by: NURSE PRACTITIONER

## 2024-05-02 PROCEDURE — 97110 THERAPEUTIC EXERCISES: CPT | Performed by: PHYSICAL THERAPIST

## 2024-05-02 PROCEDURE — 63710000001 SACUBITRIL-VALSARTAN 24-26 MG TABLET: Performed by: NURSE PRACTITIONER

## 2024-05-02 PROCEDURE — 63710000001 RIVAROXABAN 10 MG TABLET: Performed by: NURSE PRACTITIONER

## 2024-05-02 RX ADMIN — HYDROCODONE BITARTRATE AND ACETAMINOPHEN 1 TABLET: 7.5; 325 TABLET ORAL at 01:17

## 2024-05-02 RX ADMIN — HYDROCODONE BITARTRATE AND ACETAMINOPHEN 1 TABLET: 7.5; 325 TABLET ORAL at 05:48

## 2024-05-02 RX ADMIN — PAROXETINE HYDROCHLORIDE HEMIHYDRATE 20 MG: 20 TABLET, FILM COATED ORAL at 06:08

## 2024-05-02 RX ADMIN — BUPROPION HYDROCHLORIDE 300 MG: 300 TABLET, EXTENDED RELEASE ORAL at 08:32

## 2024-05-02 RX ADMIN — SODIUM CHLORIDE 2 G: 900 INJECTION INTRAVENOUS at 05:48

## 2024-05-02 RX ADMIN — SACUBITRIL AND VALSARTAN 2 TABLET: 24; 26 TABLET, FILM COATED ORAL at 08:32

## 2024-05-02 RX ADMIN — RIVAROXABAN 10 MG: 10 TABLET, FILM COATED ORAL at 08:32

## 2024-05-02 NOTE — PLAN OF CARE
Goal Outcome Evaluation:  Plan of Care Reviewed With: patient           Outcome Evaluation: Pt is s/p L TKA and is WBAT. Pt presents with pain, limited ROM and antalgic gait. Pt ambulated well with Rwx and safely navigated a flight of stairs. Pt demonstrated independence with HEP. Pt plans to d/c home with   PT to follow and daughter to assist as needed.  Pt is safe to d/c and PT will sign off. Pt was educated on HEP, use of Rwx and gait belt and stair training.      Anticipated Discharge Disposition (PT): home with assist, home with home health

## 2024-05-02 NOTE — DISCHARGE SUMMARY
Patient Name: Olivia Addison  Patient YOB: 1945    Date of Admission:  5/1/2024  Date of Discharge:  5/2/2024  Discharge Diagnosis: NH ARTHRP KNE CONDYLE&PLATU MEDIAL&LAT COMPARTMENTS [90005] (TOTAL KNEE ARTHROPLASTY)  Presenting Problem/History of Present Illness: Primary osteoarthritis of left knee [M17.12]  OA (osteoarthritis) of knee [M17.9]  Admitting Physician: Dr Greyson Mccrary  Consults:   Consults       No orders found for last 30 day(s).            DETAILS OF HOSPITAL STAY:  Patient is a 78 y.o. female was admitted to the floor following the above procedure and underwent an uncomplicated hospital stay.  Patient did well with physical therapy and was ambulating well without problems.  On the day of discharge the wound was clean, dry and intact and calf was soft and nontender and Homans sign was negative.  Patient was tolerating  without problems.  Patient will be discharged home.    Condition on Discharge:  Stable    Vital Signs  Temp:  [96.6 °F (35.9 °C)-98.4 °F (36.9 °C)] 97.9 °F (36.6 °C)  Heart Rate:  [67-82] 72  Resp:  [15-19] 16  BP: ()/(60-81) 97/61    LABS:      Admission on 05/01/2024   Component Date Value Ref Range Status    Glucose 05/01/2024 105  70 - 130 mg/dL Final    Hemoglobin 05/02/2024 11.2 (L)  12.0 - 15.9 g/dL Final    Hematocrit 05/02/2024 32.8 (L)  34.0 - 46.6 % Final       No results found.    Discharge Medications     Discharge Medications        New Medications        Instructions Start Date   HYDROcodone-acetaminophen 7.5-325 MG per tablet  Commonly known as: NORCO   1 tablet, Oral, Every 4 Hours PRN      ondansetron 4 MG tablet  Commonly known as: Zofran   4 mg, Oral, Every 8 Hours PRN      pantoprazole 40 MG EC tablet  Commonly known as: PROTONIX   40 mg, Oral, Daily      polyethylene glycol 17 GM/SCOOP powder  Commonly known as: MIRALAX   17 g, Oral, 2 Times Daily      rivaroxaban 10 MG tablet  Commonly known as: XARELTO   10 mg, Oral, Daily              Changes to Medications        Instructions Start Date   spironolactone 25 MG tablet  Commonly known as: ALDACTONE  What changed: when to take this   TAKE 1 TABLET BY MOUTH EVERY DAY             Continue These Medications        Instructions Start Date   acyclovir 400 MG tablet  Commonly known as: ZOVIRAX   400 mg, Oral, 2 Times Daily, Take no more than 5 doses a day.       atorvastatin 40 MG tablet  Commonly known as: LIPITOR   40 mg, Oral, Daily      buPROPion  MG 24 hr tablet  Commonly known as: WELLBUTRIN XL   300 mg, Oral, Daily      carvedilol 25 MG tablet  Commonly known as: COREG   25 mg, Oral, 2 Times Daily      furosemide 20 MG tablet  Commonly known as: LASIX   20 mg, Oral, Every Other Day      PARoxetine 20 MG tablet  Commonly known as: PAXIL   1 tablet, Oral, Every Morning      Rybelsus 14 MG tablet  Generic drug: Semaglutide   14 mg, Oral, Daily, HOLD AM OF SURGERY      sacubitril-valsartan 24-26 MG tablet  Commonly known as: ENTRESTO   2 tablets, Oral, 2 Times Daily             Stop These Medications      TURMERIC PO     VITAMIN D PO              Discharge Instructions: Patient is to continue with physical therapy exercises daily and continue working with the physical therapist as ordered. Patient may weight bear as tolerated. Apply ice regularly. Patient may ice for long periods of time as long as ice is not directly on the skin. Patient instructed on frequent calf pumping exercises.  Patient also instructed on incentive spirometer during hospitalization and encouraged to continue to use at home regularly.    Dressing: The dressing is designed to be left in place until you return to the office in 2 weeks.  The suction unit should stop functioning at 7 days and the green light will switch to yellow.  At that point the suction unit and tubing can be disconnected at the port closest to the dressing.  The suction unit and tubing may be discarded.  You may shower immediately upon return home, you  will need to turn the pump off by depressing the orange button once and then you may disconnect the pump and tubing at the connection port.  After showering, shake off the excess water and reattach the tubing.  Restart the pump by depressing the orange button one time and you will notice the green light flashing again.  If the dressing becomes dislodged or saturated it should be changed. Please refer to the MARISELA information sheet if you have any questions about the dressing.  If you have a home health nurse or therapist they can be contacted to assist with dressing change or repair. You may also call the MARISELA dressing hotline for questions related to the dressing ([phone]). If there are still other problems or questions related to the dressing despite these measures then you can contact Antonina at our office [phone].  If for some reason the MARISELA dressing is removed, after 7 days the wound can be gently cleaned with antibacterial soap then allowed to dry and covered with a dry sterile dressing. The wound should be covered at all times except while showering.  Patient may change dressings daily and prn using sterile 4x4 and paper tape, and should call if any unusual drainage, redness or swelling.*  Follow up appointment in 2 weeks - patient to call the office at [phone] to schedule.  Patient will be discharged on Xarelto as directed    Discharge Diagnosis:    Primary osteoarthritis of left knee    OA (osteoarthritis) of knee      Follow-up Appointments  Future Appointments   Date Time Provider Department Center   5/16/2024 11:00 AM Greyson Mccrary MD [facility]   7/5/2024 12:30 PM [facility]WAY[facility] POSADAS [place] DEVICE CHECK [facility] L[facility]40 None   7/5/2024  1:00 PM Raymond Ramirez PA-C [facility] None          HUSSEIN Price  05/02/24  08:35 EDT

## 2024-05-02 NOTE — DISCHARGE PLACEMENT REQUEST
"Olivia Addison (78 y.o. Female)       Date of Birth   1945    Social Security Number       Address   [address] [place] [id number]    Home Phone       MRN   [id number]       Episcopal   Mandaeism    Marital Status                               Admission Date   5/1/24    Admission Type   Elective    Admitting Provider   Greyson Mccrary MD    Attending Provider   Greyson Mccrary MD    Department, Room/Bed   [address], [address]       Discharge Date       Discharge Disposition   Home-Health Care [facility]    Discharge Destination                                 Attending Provider: Greyson Mccrary MD    Allergies: Elemental Sulfur, Albuterol, Pantoprazole, Sulfa Antibiotics    Isolation: None   Infection: None   Code Status: Prior    Ht: 153.8 cm (60.55\")   Wt: 69.7 kg (153 lb 10.6 oz)    Admission Cmt: None   Principal Problem: Primary osteoarthritis of left knee [M17.12]                   Active Insurance as of 5/1/2024       Primary Coverage       Payor Plan Insurance Group Employer/Plan Group    MEDICARE MEDICARE A & B        Payor Plan Address Payor Plan Phone Number Payor Plan Fax Number Effective Dates    [address] [phone]  6/1/2011 - None Entered    formerly [facility] [id number]         Subscriber Name Subscriber Birth Date Member ID       OLIVIA ADDISON 1945 [id number]               Secondary Coverage       Payor Plan Insurance Group Employer/Plan Group    [facility] SUPP [id number]       Payor Plan Address Payor Plan Phone Number Payor Plan Fax Number Effective Dates    [address]   12/1/2016 - None Entered    [place], [place] [address]         Subscriber Name Subscriber Birth Date Member ID       OLIVIA ADDISON 1945 [id number]                     Emergency Contacts        (Rel.) Home Phone Work Phone Mobile Phone    Tisha Felton (Daughter) [phone] -- [phone]    cristela gonzalez (Sister) -- -- [phone]    " SOM JENKINS ([facility]) -- -- [phone]

## 2024-05-02 NOTE — THERAPY EVALUATION
Patient Name: Olivia Addison  : 1945    MRN: [id number]                              Today's Date: 2024       Admit Date: 2024    Visit Dx:     ICD-10-CM ICD-9-CM   1. S/P TKR (total knee replacement), left  Z96.652 V43.65   2. Primary osteoarthritis of left knee  M17.12 715.16     Patient Active Problem List   Diagnosis    H/O tricuspid valve repair    Abdominal pain    Abdominal pain, left lower quadrant    Acute conjunctivitis    Adjustment disorder with anxious mood    Alcohol abuse    Anxiety    Attention deficit disorder    Biventricular implantable cardioverter-defibrillator in situ    Cardiomyopathy, nonischemic    Diverticulitis of colon    Diverticulosis of intestine    Flank pain    H/O Estrellita-Biswas syndrome    Hematuria    Hypercholesterolemia    Low back pain    AMADO (obstructive sleep apnea)    Posttraumatic stress disorder    PTSD (post-traumatic stress disorder)    Urinary tract infection    S/P MVR (mitral valve repair)    Diverticulitis    Class 1 obesity in adult    LBBB (left bundle branch block)    CKD (chronic kidney disease) stage 3, GFR 30-59 ml/min    Weakness of right upper extremity    Numbness and tingling in right hand    Ulnar neuropathy at wrist, right    Primary osteoarthritis of left knee    OA (osteoarthritis) of knee     Past Medical History:   Diagnosis Date    ADHD (attention deficit hyperactivity disorder)     Adjustment disorder with anxiety     Allergic rhinitis     Anesthesia complication     SEVERE ITCHING WITH EPIDURAL    Anxiety and depression     Arthritis     Atherosclerosis     Atrial fibrillation     HX    Atrophic vaginitis     Blepharitis of both eyes     CAD (coronary artery disease)     Cardiomyopathy     NON ISCHEMIC     Cataract     CHF (congestive heart failure)     CHRONIC SYSTOLIC    CKD (chronic kidney disease) stage 3, GFR 30-59 ml/min     DDD (degenerative disc disease), cervical     Diaphoresis 2019    SEEN AT [facility] UC     Diverticulitis 09/02/2019    SEEN AT [facility] ER    Diverticulitis 11/15/2016    Diverticulitis 09/07/2014    Diverticulitis     Diverticulitis     Diverticulitis large intestine 05/23/2017    Diverticulitis of colon 08/22/2019    ADMITTED TO [facility]    Dry eye syndrome     DVT (deep venous thrombosis)     RIGHT LEFT POST HEART SURGERY    Dyspnea     Dysuria 06/2019    Esophageal injury     ESTRELLITA-RAMOS SYNDROME    Fatigue     Heart failure     Hematuria 08/2019    History of COVID-19     History of frequent urinary tract infections     History of mitral valve repair     History of posttraumatic stress disorder (PTSD)     History of sepsis     FROM UTI    History of sepsis     History of transfusion 2012    Hypercholesterolemia     Hyperlipidemia     Hypertension     Kidney cysts 07/2017    FOLLOWED BY DR. RITU AMOS    LBBB (left bundle branch block)     Estrellita-Ramos syndrome     TEAR IN ESOPHAGUS AFTER HEART SURGERY    Memory loss     NSVT (nonsustained ventricular tachycardia)     Osteoarthritis of left knee     Osteoporosis     Patent foramen ovale     Pneumonia 05/25/2017    Presbyopia     Presence of cardiac defibrillator     PTSD (post-traumatic stress disorder)     Reactive airway disease     Regular astigmatism     RLS (restless legs syndrome)     S/P TVR (tricuspid valve repair)     Seborrheic keratosis     FOLLOWED BY DR. HARMAN CÁRDENAS    Segmental and somatic dysfunction of cervical region     Segmental and somatic dysfunction of thoracic region     Sleep apnea     NO LONGER USES MACHINE    SOB (shortness of breath) on exertion     R/T HEART    Vitamin D deficiency     VT (ventricular tachycardia)      Past Surgical History:   Procedure Laterality Date    APPENDECTOMY N/A     BIVENTRICULLAR IMPLANTABLE CARDIOVERTER DEFIBRILLATOR PLACEMENT N/A 04/10/2012    [employer], BI-V, DR. MICKEY MORALES AT [facility]    BLADDER SUSPENSION N/A     BLEPHAROPLASTY Bilateral 03/03/2022    Procedure: BILATERAL UPPER LID  BLEPHAROPLASTY;  Surgeon: Edu Power MD;  [place]  [facility] [place] [facility] [place]  Service: Ophthalmology;  Laterality: Bilateral;    BLEPHAROPLASTY Bilateral 03/03/2022    Procedure: BILATERAL LOWER LID BLEPHAROPLASTY;  Surgeon: Edu Power MD;  Location:  [facility];  Service: New Image;  Laterality: Bilateral;    BREAST CYST ASPIRATION      BREAST SURGERY Bilateral     REDUCTION MAMMAPLASTY    BROW LIFT Bilateral 03/03/2022    Procedure: BILATERAL LATERAL BROWPEXY;  Surgeon: Edu Power MD;  Location: [facility] OR [facility];  Service: Ophthalmology;  Laterality: Bilateral;    CARDIAC CATHETERIZATION Bilateral 03/19/2012    Normal coronary arteries, severe mitral regurgitation and aortic regurgitation. EF was about 30-35%.; DR. JYOTI ROMERO AT [facility]    CARDIAC ELECTROPHYSIOLOGY PROCEDURE Left 07/11/2016    Procedure: PPM battery change [employer];  Surgeon: Hunter Faust MD;  Location: [facility] CATH INVASIVE LOCATION;  Service:     CATARACT EXTRACTION Bilateral     COLON SURGERY  2019    DR ROCK AT [facility]    COLONOSCOPY N/A 10/14/2014    PANDIVERTICULOSIS IN ENTIRE COLON, DR. BRANDEN RUIZ AT [facility]    COLONOSCOPY N/A 11/13/2019    SCATTERED SMALL AND LARGE DIVERTICULA IN ENTIRE COLON, RESCOPE IN 10 YRS, DR. PERCY MUHAMMAD AT [facility]    ENDOSCOPY N/A 06/06/2012    CHAD-RAMOS TEAR WITH ACTIVE BLEEDINGBLOOD THROUGHOUT STOMACH, BLOOD IN DUODENUM, DR. TARUN GILLIS AT [facility]    ESOPHAGUS SURGERY  06/2012    TEAR DURING HEART SURGERY, AT BOTTOM OF ESOPHAGUS    HYSTERECTOMY N/A 1980    OLIVIA WITH BSO    IMPLANTABLE CARDIOVERTER DEFIBRILLATOR LEAD REPLACEMENT/POCKET REVISION N/A 08/28/2012    DR. GISELLE BOOGIE AT [facility]    MITRAL VALVE REPAIR/REPLACEMENT N/A 06/06/2012    REMODELING  ANULOPLASTY USING 24 MM BARAJAS ANULOPLASTY RING, MODEL 5200, SERIAL # [id number], DR. VINCE ZAMUDIO AT [facility]    PATENT FORAMEN OVALE CLOSURE N/A 06/06/2012    DR. VINCE ZAMUDIO AT [facility]    TONSILLECTOMY Bilateral      TOTAL KNEE ARTHROPLASTY Left 5/1/2024    Procedure: TOTAL KNEE ARTHROPLASTY;  Surgeon: Greyson Mccrary MD;  Location[place] [facility] [place];  Service: Orthopedics;  Laterality: Left;    TRICUSPID VALVE SURGERY N/A 06/06/2012    REMODELING ANULOPLASTY USING 26 MM BARAJAS ANULOPLASTY RING, MODEL 6200, SERIAL # [id number], DR. VINCE ZAMUDIO AT [facility] N/A 1974      General Information       Row Name 05/02/24 1047          Physical Therapy Time and Intention    Document Type evaluation  -     Mode of Treatment individual therapy;physical therapy  -       Row Name 05/02/24 1047          General Information    Patient Profile Reviewed yes  -     Prior Level of Function independent:  -     Existing Precautions/Restrictions fall  -     Barriers to Rehab none identified  -       Row Name 05/02/24 1047          Living Environment    People in Home child(ilsa), adult  -       Row Name 05/02/24 1047          Home Main Entrance    Number of Stairs, Main Entrance other (see comments)  19  -       Row Name 05/02/24 1047          Cognition    Orientation Status (Cognition) oriented x 4  -               User Key  (r) = Recorded By, (t) = Taken By, (c) = Cosigned By      Initials Name Provider Type     Sadia Hook, PT Physical Therapist                   Mobility       Row Name 05/02/24 1048          Bed Mobility    Bed Mobility supine-sit  -     Supine-Sit [place] (Bed Mobility) standby assist  -       Row Name 05/02/24 1048          Sit-Stand Transfer    Sit-Stand [place] (Transfers) contact [facility]  -     Assistive Device (Sit-Stand Transfers) walker, front-wheeled  -       Row Name 05/02/24 1048          Gait/Stairs (Locomotion)    [place] Level (Gait) contact guard  -     Assistive Device (Gait) walker, front-wheeled  -     Distance in Feet (Gait) 120  -KH     [place] Level (Stairs) contact guard  -     Handrail Location (Stairs) right side (ascending)  -      Number of Steps (Stairs) 10  -     Ascending Technique (Stairs) step-to-step  -     Descending Technique (Stairs) step-to-step  -[facility] Name 05/02/24 1048          Mobility    Extremity Weight-bearing Status left lower extremity  -     Left Lower Extremity (Weight-bearing Status) weight-bearing as tolerated (WBAT)  -               User Key  (r) = Recorded By, (t) = Taken By, (c) = Cosigned By      Initials Name Provider Type     Sadia Hook PT Physical Therapist                   Obj/Interventions       [facility] Name 05/02/24 1050          Range of Motion Comprehensive    General Range of Motion lower extremity range of motion deficits identified  -     Comment, General Range of Motion Surgical knee ROM:0-90  -[facility] Name 05/02/24 1050          Strength Comprehensive (MMT)    General Manual Muscle Testing (MMT) Assessment lower extremity strength deficits identified  -[facility] Name 05/02/24 1050          Motor Skills    Therapeutic Exercise hip;knee  L TKA protocol x 10reps  -[facility] Name 05/02/24 1050          Hip (Therapeutic Exercise)    Hip (Therapeutic Exercise) isometric exercises;strengthening exercise  -     Hip Isometrics (Therapeutic Exercise) flexion;extension;gluteal sets  -[facility] Name 05/02/24 1050          Knee (Therapeutic Exercise)    Knee (Therapeutic Exercise) isometric exercises;strengthening exercise  -     Knee Isometrics (Therapeutic Exercise) quad sets  -     Knee Strengthening (Therapeutic Exercise) SLR (straight leg raise);SAQ (short arc quad);LAQ (long arc quad);heel slides  -[facility] Name 05/02/24 1050          Balance    Balance Interventions sitting;standing;sit to stand  -[facility] Name 05/02/24 1050          Sensory Assessment (Somatosensory)    Sensory Assessment (Somatosensory) LE sensation intact  -               User Key  (r) = Recorded By, (t) = Taken By, (c) = Cosigned By      Initials Name Provider Type      Sadia Hook, PT Physical Therapist                   Goals/Plan    No documentation.                  Clinical Impression       Row Name 05/02/24 1050          Pain    Pretreatment Pain Rating 6/10  -     Posttreatment Pain Rating 8/10  -     Pain Location - Side/Orientation Left  -     Pain Location - knee  -     Pain Intervention(s) Cold applied;Rest;Repositioned  -       Row Name 05/02/24 1050          Plan of Care Review    Plan of Care Reviewed With patient  -     Outcome Evaluation Pt is s/p L TKA and is WBAT. Pt presents with pain, limited ROM and antalgic gait. Pt ambulated well with Rwx and safely navigated a flight of stairs. Pt demonstrated independence with HEP. Pt plans to d/c home with   PT to follow and daughter to assist as needed.  Pt is safe to d/c and PT will sign off. Pt was educated on HEP, use of Rwx and gait belt and stair training.  -       Row Name 05/02/24 1050          Therapy Assessment/Plan (PT)    Patient/Family Therapy Goals Statement (PT) return home to [facility]     Criteria for Skilled Interventions Met (PT) no problems identified which require skilled intervention  -     Therapy Frequency (PT) evaluation only  -       Row Name 05/02/24 1050          Positioning and Restraints    Pre-Treatment Position in bed  -     Post Treatment Position bed  -     In Bed fowlers;call light within reach;encouraged to call for assist;exit alarm on;notified [facility]               User Key  (r) = Recorded By, (t) = Taken By, (c) = Cosigned By      Initials Name Provider Type     Sadia Hook, PT Physical Therapist                   Outcome Measures       Row Name 05/02/24 1153 05/02/24 0821       How much help from another person do you currently need...    Turning from your back to your side while in flat bed without using bedrails? 4  - 3  -TW    Moving from lying on back to sitting on the side of a flat bed without bedrails? 4  - 3  -TW     Moving to and from a bed to a chair (including a wheelchair)? 3  -KH 3  -TW    Standing up from a chair using your arms (e.g., wheelchair, bedside chair)? 3  -KH 3  -TW    Climbing 3-5 steps with a railing? 3  -KH 2  -TW    To walk in hospital room? 3  -KH 3  -TW    AM-PAC 6 Clicks Score (PT) 20  -KH 17  -TW    Highest Level of Mobility Goal 6 --> Walk 10 steps or more  -KH 5 --> Static standing  -TW      Row Name 05/02/24 1153          Functional Assessment    Outcome Measure Options AM-PAC 6 Clicks Basic Mobility (PT)  -               User Key  (r) = Recorded By, (t) = Taken By, (c) = Cosigned By      Initials Name Provider Type    Sadia Paz, PT Physical Therapist    Kirsten Douglas, RN Registered Nurse                                   PT Recommendation and Plan     Plan of Care Reviewed With: patient  Outcome Evaluation: Pt is s/p L TKA and is WBAT. Pt presents with pain, limited ROM and antalgic gait. Pt ambulated well with Rwx and safely navigated a flight of stairs. Pt demonstrated independence with HEP. Pt plans to d/c home with   PT to follow and daughter to assist as needed.  Pt is safe to d/c and PT will sign off. Pt was educated on HEP, use of Rwx and gait belt and stair training.     Time Calculation:         PT Charges       Row Name 05/02/24 1154             Time Calculation    Start Time 1045  -      Stop Time 1110  -KH      Time Calculation (min) 25 min  -KH      PT Received On 05/02/24  -         Time Calculation- PT    Total Timed Code Minutes- PT 15 minute(s)  -KH         Timed Charges    43674 - PT Therapeutic Exercise Minutes 8  -KH      17266 - Gait Training Minutes  7  -KH         Untimed Charges    PT Eval/Re-eval Minutes 10  -KH         Total Minutes    Timed Charges Total Minutes 15  -KH      Untimed Charges Total Minutes 10  -KH       Total Minutes 25  -KH                User Key  (r) = Recorded By, (t) = Taken By, (c) = Cosigned By      Initials Name Provider  Type    KH Sadia Hook, PT Physical Therapist                  Therapy Charges for Today       Code Description Service Date Service Provider Modifiers Qty    77512186441 HC PT THER PROC EA 15 MIN 5/2/2024 Sadia Hook, PT GP 1    72597702565 HC PT EVAL MOD COMPLEXITY 2 5/2/2024 Sadia Hook, PT GP 1            PT G-Codes  Outcome Measure Options: AM-PAC 6 Clicks Basic Mobility (PT)  AM-PAC 6 Clicks Score (PT): 20  PT Discharge Summary  Anticipated Discharge Disposition (PT): home with assist, home with home health    Sadia Hook, PT  5/2/2024

## 2024-05-02 NOTE — CASE MANAGEMENT/SOCIAL WORK
Case Management Discharge Note      Final Note: Discharged home with [facility] and [employer] for rolling walker. Family to transport. Orders reviewed no further needs         Selected Continued Care - Discharged on 5/2/2024 Admission date: 5/1/2024 - Discharge disposition: Home-Health Care c      Destination    No services have been selected for the patient.                Durable Medical Equipment    No services have been selected for the patient.                Dialysis/Infusion    No services have been selected for the patient.                Home Medical Care       Service Provider Selected Services Address Phone Fax Patient Preferred    [facility]  [address] [place] [address], [address] [address] [phone] --              Therapy    No services have been selected for the patient.                Community Resources    No services have been selected for the patient.                Community & DME    No services have been selected for the patient.                         Final Discharge Disposition Code: 06 - home with home health care

## 2024-05-02 NOTE — PLAN OF CARE
Goal Outcome Evaluation:  Plan of Care Reviewed With: patient        Progress: improving  Outcome Evaluation: VSS. Lortab given for pain.  Left knee dressing CDI.  Hemovac x1 moderate amount of bloody rainage.  Arik drain in place.   Hemovac to be removed this am.  Falls precaution maintained.

## 2024-05-03 ENCOUNTER — TELEPHONE (OUTPATIENT)
Dept: ORTHOPEDIC SURGERY | Facility: CLINIC | Age: 79
End: 2024-05-03
Payer: MEDICARE

## 2024-05-03 NOTE — TELEPHONE ENCOUNTER
Tisha (daughter) called and stated that her mother was not sent home with home exercise program and was not shown how to do ankle pumps. Tisha was transferred to Antonina Valera RN to better assist and answer her questions.

## 2024-05-03 NOTE — TELEPHONE ENCOUNTER
Spoke to patient's daughter Tisha which was transferred through by the H[facility].  She stated she wanted to speak to someone about when patient had surgery she arrived to pick her up and PT had already been there to see her and gave her exercises to do once she got home. Patient was not sent home with any paperwork that showed what she should be doing. She stated this is day 3 and she is concerned with blood clots being a concern and that is why she is on the blood thinner that she should be doing some type of movements.     Please review and advise. I told her that the [facility] is scheduled to see her today but she stated this does not help her today

## 2024-05-03 NOTE — TELEPHONE ENCOUNTER
Patient called concerned because she has not been contacted by home health yet to set up post op care.     Surgery date: 5/1/24  Dr Mccrary for left total knee replacement     Patient otherwise doing well.

## 2024-05-06 NOTE — TELEPHONE ENCOUNTER
Spoke with the patient's daughter for an extended amount of time.  Apparently the patient did not get her total joint booklet with the exercises and it at the time of her preadmission testing.  Home health is scheduled to see her tomorrow however she has not been doing any exercises since she got home from the hospital yesterday.  Have advised the patient and the daughter that it is important that she do the exercises twice a day on her own she should also be up walking around every couple of hours.  She does need to keep the leg elevated and be aggressive with ankle pumps.  I have sent her copy of the exercises to begin doing on her own until she is seen by [facility]

## 2024-05-07 ENCOUNTER — TELEPHONE (OUTPATIENT)
Dept: ORTHOPEDICS | Facility: OTHER | Age: 79
End: 2024-05-07

## 2024-05-07 NOTE — TELEPHONE ENCOUNTER
I have called and spoke to this patient in regards to her questions and concerns.  Patient can discontinue the pain medicine and just take Tylenol.  I have answered her other surgical questions.

## 2024-05-07 NOTE — TELEPHONE ENCOUNTER
Caller: Olivia Addison     Relationship: SELF    Best call back number:[phone]    What is your medical concern?     PT STATES THAT IT HAS BEEN ALMOST A WEEK AND SINCE HER KNEE REPLACEMENT (MAY 1, 2024) WOULD LIKE TO KNOW IF SHE CAN STOP THE HYDOCODONE AND SWITCH OVER THE COUNTER TYLENOL. PLEASE ADVISE    PT WOULD LIKE TO KNOW WHY DR. AMOS DIDN'T TALK TO ANYONE IN HER FAMILY REGARDING THE SURGERY. PT STATES THAT SHE WOULD LIKE TO BE EDUCATED A LITTLE ABOUT THE SURGERY. - HOW IT WENT- CAN DR. AMOS OR HIS ASSISTANT PLEASE ADVISE

## 2024-05-08 ENCOUNTER — TELEPHONE (OUTPATIENT)
Dept: ORTHOPEDIC SURGERY | Facility: HOSPITAL | Age: 79
End: 2024-05-08
Payer: MEDICARE

## 2024-05-13 ENCOUNTER — TELEPHONE (OUTPATIENT)
Dept: CARDIOLOGY | Facility: CLINIC | Age: 79
End: 2024-05-13
Payer: MEDICARE

## 2024-05-13 NOTE — TELEPHONE ENCOUNTER
Remote HF transmission dated 05/13/2024.  [employer] CRT-D.     OptiVol Fluid index remains elevated.  Thoracic impedance is trending well below baseline. Patient activity has declined significantly.  Night heart rate is trending around 70-80 bpm.  CRT-D.     Patient had a Total Left Knee Replacement on 05/01/2024.  This may contribute to the worsening risk factors.  Additionally, LV lead is fractured so not sure if readings will be accurate or not. Noted that LV lead is turned off.     I have attempted to call the patient. No answer, message left on patient's VM.

## 2024-05-16 ENCOUNTER — OFFICE VISIT (OUTPATIENT)
Dept: ORTHOPEDIC SURGERY | Facility: CLINIC | Age: 79
End: 2024-05-16
Payer: MEDICARE

## 2024-05-16 VITALS — WEIGHT: 153.9 LBS | HEIGHT: 62 IN | TEMPERATURE: 98.8 F | BODY MASS INDEX: 28.32 KG/M2

## 2024-05-16 DIAGNOSIS — Z96.652 STATUS POST LEFT KNEE REPLACEMENT: Primary | ICD-10-CM

## 2024-05-16 NOTE — PROGRESS NOTES
Olivia Addison : 1945 MRN: [id number] DATE: 2024    DIAGNOSIS: 2 week follow up left total knee      SUBJECTIVE:Patient returns today for 2 week follow up of left total knee replacement. Patient reports doing well with no unusual complaints. Appears to be progressing appropriately.    OBJECTIVE:   Exam:. The incision is healing appropriately. No sign of infection. Range of motion is progressing as expected. The calf is soft and nontender with a negative Homans sign.    ASSESSMENT: 2 week status post left knee replacement.    PLAN: 1) Staples removed and steri strips applied   2) Order given for PT   3) Discontinue ADELITA hose   4) Continue ice PRN   5)  Xarelto  for 2 weeks   6) Follow up in 6 weeks with repeat Xrays of left knee (3views)    Greyson Mccrary MD  2024

## 2024-05-20 DIAGNOSIS — Z96.652 S/P TKR (TOTAL KNEE REPLACEMENT), LEFT: ICD-10-CM

## 2024-05-20 NOTE — TELEPHONE ENCOUNTER
Caller: Olivia Addison    Relationship: Self    Best call back number: [phone]    Requested Prescriptions: NORCO 7.5-325 MG  Requested Prescriptions      No prescriptions requested or ordered in this encounter        Pharmacy where request should be sent:  [facility] [phone]    Last office visit with prescribing clinician: 5/16/2024      Does the patient have less than a 3 day supply:  [x] Yes  [] No

## 2024-05-20 NOTE — TELEPHONE ENCOUNTER
Last refill 05/01/24    Surgery 05/01/24    Last appointment 05/16/24    Future appointment 06/27/24

## 2024-05-21 RX ORDER — HYDROCODONE BITARTRATE AND ACETAMINOPHEN 7.5; 325 MG/1; MG/1
1 TABLET ORAL EVERY 4 HOURS PRN
Qty: 40 TABLET | Refills: 0 | Status: SHIPPED | OUTPATIENT
Start: 2024-05-21

## 2024-05-23 ENCOUNTER — TELEPHONE (OUTPATIENT)
Dept: ORTHOPEDIC SURGERY | Facility: CLINIC | Age: 79
End: 2024-05-23

## 2024-05-23 NOTE — TELEPHONE ENCOUNTER
Spoke with pharmacy and needed the day supply changed to 7 in order for insurance to pay. Pharmacy is filling rx and patient notified

## 2024-05-23 NOTE — TELEPHONE ENCOUNTER
Caller: HOMER SANTIAGO     Relationship: PATIENT     Best call back number: [phone]    Requested Prescriptions:   Requested Prescriptions      No prescriptions requested or ordered in this encounter      HYDROcodone-acetaminophen (NORCO) 7.5-325 MG per tablet   PREV REQ 05/21/2024    Pharmacy where request should be sent:    [facility]/pharmacy #[id number] - [place], [place] - [address] [facility] - [phone]  - [phone]    [address] [place], [place] [address]   Phone: [phone] Fax: [phone]   Hours: Not open 24 hours       Last office visit with prescribing clinician: 5/16/2024   Last telemedicine visit with prescribing clinician: Visit date not found   Next office visit with prescribing clinician: Visit date not found     Additional details provided by patient: PATIENT STATES PHARMACY HAS NOT RECEIVED ORDER AND SHE IS COMPLETELY OUT     Does the patient have less than a 3 day supply:  [x] Yes  [] No    Would you like a call back once the refill request has been completed: [] Yes [x] No    If the office needs to give you a call back, can they leave a voicemail: [] Yes [x] No    Galilea Hurt Rep   05/23/24 16:08 EDT

## 2024-06-27 ENCOUNTER — OFFICE VISIT (OUTPATIENT)
Dept: ORTHOPEDIC SURGERY | Facility: CLINIC | Age: 79
End: 2024-06-27
Payer: MEDICARE

## 2024-06-27 VITALS — TEMPERATURE: 96.8 F | HEIGHT: 62 IN | BODY MASS INDEX: 26.35 KG/M2 | WEIGHT: 143.2 LBS

## 2024-06-27 DIAGNOSIS — Z96.652 S/P TKR (TOTAL KNEE REPLACEMENT), LEFT: Primary | ICD-10-CM

## 2024-06-27 PROCEDURE — 99024 POSTOP FOLLOW-UP VISIT: CPT | Performed by: NURSE PRACTITIONER

## 2024-06-27 NOTE — PROGRESS NOTES
Olivia Addison : 1945 MRN: [id number] DATE: 2024    DIAGNOSIS: 8 week follow up left total knee      SUBJECTIVE:Patient returns today for 8 week follow up of left total knee replacement.  Patient reports that her pain level is very minimal and currently rates it as a 0 out of 10.  Reports she does have some moderate discomfort when she is going down flights of stairs.  States that she is okay going up the stairs.  Reports that she is still going to outpatient physical therapy at [facility] location. Appears to be progressing appropriately.  She is no longer using any assistive device to help with ambulation.  Denies any instability or buckling issues.  Denies any signs or symptoms of infection, she is without any other significant complaints today.    OBJECTIVE:   Exam:. The incision is well healed. No sign of infection. Range of motion is measured at 2 to 120. The calf is soft and nontender with a negative Homans sign. Strength is progressing and the patient is ambulating appropriately.    DIAGNOSTIC STUDIES  Xrays: 3 views of the left knee (AP, lateral, and sunrise) were ordered and reviewed for evaluation of recent knee replacement. They demonstrate a well positioned, well aligned knee replacement without complicating factors noted. In comparison with previous films there has been no change.    ASSESSMENT: 8 week status post left knee replacement.    PLAN: 1) Continue with PT exercises as prescribed   2) Follow up in 10 months for annual visit    HUSSEIN Price  2024

## 2024-07-05 ENCOUNTER — OFFICE VISIT (OUTPATIENT)
Age: 79
End: 2024-07-05
Payer: MEDICARE

## 2024-07-05 ENCOUNTER — CLINICAL SUPPORT NO REQUIREMENTS (OUTPATIENT)
Age: 79
End: 2024-07-05
Payer: MEDICARE

## 2024-07-05 VITALS
SYSTOLIC BLOOD PRESSURE: 112 MMHG | HEART RATE: 78 BPM | DIASTOLIC BLOOD PRESSURE: 80 MMHG | WEIGHT: 143 LBS | BODY MASS INDEX: 26.31 KG/M2 | HEIGHT: 62 IN

## 2024-07-05 DIAGNOSIS — I42.8 CARDIOMYOPATHY, NONISCHEMIC: Primary | ICD-10-CM

## 2024-07-05 DIAGNOSIS — I44.7 LBBB (LEFT BUNDLE BRANCH BLOCK): Primary | ICD-10-CM

## 2024-07-05 DIAGNOSIS — Z95.810 BIVENTRICULAR IMPLANTABLE CARDIOVERTER-DEFIBRILLATOR IN SITU: ICD-10-CM

## 2024-07-05 DIAGNOSIS — I42.8 CARDIOMYOPATHY, NONISCHEMIC: ICD-10-CM

## 2024-07-05 PROCEDURE — 93000 ELECTROCARDIOGRAM COMPLETE: CPT

## 2024-07-05 PROCEDURE — 99213 OFFICE O/P EST LOW 20 MIN: CPT

## 2024-07-05 RX ORDER — TRAZODONE HYDROCHLORIDE 50 MG/1
50 TABLET ORAL DAILY
COMMUNITY
Start: 2024-06-03 | End: 2025-06-03

## 2024-07-05 RX ORDER — SACUBITRIL AND VALSARTAN 49; 51 MG/1; MG/1
1 TABLET, FILM COATED ORAL EVERY 12 HOURS SCHEDULED
COMMUNITY
Start: 2024-05-19

## 2024-07-05 NOTE — PROGRESS NOTES
Date of Office Visit: 2024  Encounter Provider: HUSSEIN Lozano  Place of Service: [facility] CARDIOLOGY  Patient Name: Olivia Addison  :1945    Chief Complaint   Patient presents with    LBBB    Nonischemic Cardiomyopathy    Follow-up     1 year   :     HPI: Olivia Addison is a 79 y.o. female who followed with Dr. Read, now Dr. Randall. She has history of non-ischemic cardiomyopathy status post CRT-D ().     She had CRT-D placed in .     She rarely paced.     Around  she had traumatic injury and fractured LV lead. This was star fix lead so decision made not to extract.     She felt well and had no HF symptoms. Her LVEF was 40%. Decision was made not to replaced LV lead.     She saw Dr. Randall in . She was doing well. No complaints. Rare pacing.                 She presents today for follow up appt.     Since last visit she has been doing well.     She has no complaints or concerns about her device.     Normal device testing and function. AP: 0.5%, RV: 0.1%. she has abandoned LV (star fix lead--placed in ).     Her LV lead is off.     Her last echo showed LVEF of 40%.     She has class I NYHA symptoms.      Past Medical History:   Diagnosis Date    ADHD (attention deficit hyperactivity disorder)     Adjustment disorder with anxiety     Allergic rhinitis     Anesthesia complication     SEVERE ITCHING WITH EPIDURAL    Anxiety and depression     Arthritis     Atherosclerosis     Atrial fibrillation     HX    Atrophic vaginitis     Blepharitis of both eyes     CAD (coronary artery disease)     Cardiomyopathy     NON ISCHEMIC     Cataract     CHF (congestive heart failure)     CHRONIC SYSTOLIC    CKD (chronic kidney disease) stage 3, GFR 30-59 ml/min     DDD (degenerative disc disease), cervical     Diaphoresis 2019    SEEN AT [facility] UC    Diverticulitis 2019    SEEN AT [facility] ER    Diverticulitis 11/15/2016    Diverticulitis 2014     Diverticulitis     Diverticulitis     Diverticulitis large intestine 05/23/2017    Diverticulitis of colon 08/22/2019    ADMITTED TO [facility]    Dry eye syndrome     DVT (deep venous thrombosis)     RIGHT LEFT POST HEART SURGERY    Dyspnea     Dysuria 06/2019    Esophageal injury     ESTRELLITA-RAMOS SYNDROME    Fatigue     Heart failure     Hematuria 08/2019    History of COVID-19     History of frequent urinary tract infections     History of mitral valve repair     History of posttraumatic stress disorder (PTSD)     History of sepsis     FROM UTI    History of sepsis     History of transfusion 2012    Hypercholesterolemia     Hyperlipidemia     Hypertension     Kidney cysts 07/2017    FOLLOWED BY DR. RITU AMOS    LBBB (left bundle branch block)     Estrellita-Ramos syndrome     TEAR IN ESOPHAGUS AFTER HEART SURGERY    Memory loss     NSVT (nonsustained ventricular tachycardia)     Osteoarthritis of left knee     Osteoporosis     Patent foramen ovale     Pneumonia 05/25/2017    Presbyopia     Presence of cardiac defibrillator     PTSD (post-traumatic stress disorder)     Reactive airway disease     Regular astigmatism     RLS (restless legs syndrome)     S/P TVR (tricuspid valve repair)     Seborrheic keratosis     FOLLOWED BY DR. HARMAN CÁRDENAS    Segmental and somatic dysfunction of cervical region     Segmental and somatic dysfunction of thoracic region     Sleep apnea     NO LONGER USES MACHINE    SOB (shortness of breath) on exertion     R/T HEART    Vitamin D deficiency     VT (ventricular tachycardia)        Past Surgical History:   Procedure Laterality Date    APPENDECTOMY N/A     BIVENTRICULLAR IMPLANTABLE CARDIOVERTER DEFIBRILLATOR PLACEMENT N/A 04/10/2012    [employer], BI-V, DR. MICKEY MORALES AT [facility]    BLADDER SUSPENSION N/A     BLEPHAROPLASTY Bilateral 03/03/2022    Procedure: BILATERAL UPPER LID BLEPHAROPLASTY;  Surgeon: Edu Power MD;  Location: [facility] OR [facility];  Service: Ophthalmology;  Laterality:  Bilateral;    BLEPHAROPLASTY Bilateral 03/03/2022    Procedure: BILATERAL LOWER LID BLEPHAROPLASTY;  Surgeon: Edu Power MD;  [place]  [facility] [place] [facility]  Service: New Image;  Laterality: Bilateral;    BREAST CYST ASPIRATION      BREAST SURGERY Bilateral     REDUCTION MAMMAPLASTY    BROW LIFT Bilateral 03/03/2022    Procedure: BILATERAL LATERAL BROWPEXY;  Surgeon: Edu Power MD;  Location:  [facility];  Service: Ophthalmology;  Laterality: Bilateral;    CARDIAC CATHETERIZATION Bilateral 03/19/2012    Normal coronary arteries, severe mitral regurgitation and aortic regurgitation. EF was about 30-35%.; DR. JYOTI ROMERO AT [facility]    CARDIAC ELECTROPHYSIOLOGY PROCEDURE Left 07/11/2016    Procedure: PPM battery change [employer];  Surgeon: Hunter Faust MD;  Location: [facility] CATH INVASIVE LOCATION;  Service:     CATARACT EXTRACTION Bilateral     COLON SURGERY  2019    DR ROCK AT [facility]    COLONOSCOPY N/A 10/14/2014    PANDIVERTICULOSIS IN ENTIRE COLON, DR. BRANDEN RUIZ AT [facility]    COLONOSCOPY N/A 11/13/2019    SCATTERED SMALL AND LARGE DIVERTICULA IN ENTIRE COLON, RESCOPE IN 10 YRS, DR. PERCY MUHAMMAD AT [facility]    ENDOSCOPY N/A 06/06/2012    CHAD-RAMOS TEAR WITH ACTIVE BLEEDINGBLOOD THROUGHOUT STOMACH, BLOOD IN DUODENUM, DR. TARUN GILLIS AT [facility]    ESOPHAGUS SURGERY  06/2012    TEAR DURING HEART SURGERY, AT BOTTOM OF ESOPHAGUS    HYSTERECTOMY N/A 1980    OLIVIA WITH BSO    IMPLANTABLE CARDIOVERTER DEFIBRILLATOR LEAD REPLACEMENT/POCKET REVISION N/A 08/28/2012    DR. GISELLE BOOGIE AT [facility]    MITRAL VALVE REPAIR/REPLACEMENT N/A 06/06/2012    REMODELING  ANULOPLASTY USING 24 MM BARAJAS ANULOPLASTY RING, MODEL 5200, SERIAL # [id number], DR. VINCE ZAMUDIO AT [facility]    PATENT FORAMEN OVALE CLOSURE N/A 06/06/2012    DR. VINCE ZAMUDIO AT [facility]    TONSILLECTOMY Bilateral     TOTAL KNEE ARTHROPLASTY Left 5/1/2024    Procedure: TOTAL KNEE ARTHROPLASTY;  Surgeon: Greyson Mccrary MD;  Location:   [facility];  Service: Orthopedics;  Laterality: Left;    TRICUSPID VALVE SURGERY N/A 06/06/2012    REMODELING ANULOPLASTY USING 26 MM BARAJAS ANULOPLASTY RING, MODEL 6200, SERIAL # [id number], DR. VINCE ZAMUDIO AT [facility]    VAGINAL DELIVERY N/A 1974       Social History     Socioeconomic History    Marital status:    Tobacco Use    Smoking status: Never     Passive exposure: Never    Smokeless tobacco: Never   Vaping Use    Vaping status: Never Used   Substance and Sexual Activity    Alcohol use: Yes     Comment: WINE OCCASIONAL    Drug use: No    Sexual activity: Defer     Birth control/protection: Post-menopausal, Surgical       Family History   Problem Relation Age of Onset    Diabetes Mother     Hypertension Mother     Emphysema Father     Anxiety disorder Father     Depression Father     Hypertension Father     Stroke Sister     No Known Problems Brother     Heart attack Maternal Grandmother     Sleep apnea Maternal Grandmother     No Known Problems Maternal Grandfather     No Known Problems Paternal Grandmother     No Known Problems Paternal Grandfather     No Known Problems Brother     No Known Problems Daughter     Malig Hyperthermia Neg Hx        Review of Systems   Constitutional: Negative for chills, fever and malaise/fatigue.   Cardiovascular:  Negative for chest pain, dyspnea on exertion, leg swelling, near-syncope, orthopnea, palpitations, paroxysmal nocturnal dyspnea and syncope.   Respiratory:  Negative for cough and shortness of breath.    Hematologic/Lymphatic: Negative.    Musculoskeletal:  Negative for joint pain, joint swelling and myalgias.   Gastrointestinal:  Negative for abdominal pain, diarrhea, melena, nausea and vomiting.   Genitourinary:  Negative for frequency and hematuria.   Neurological:  Negative for light-headedness, numbness, paresthesias and seizures.   Allergic/Immunologic: Negative.    All other systems reviewed and are negative.      Allergies   Allergen Reactions     "Elemental Sulfur Rash    Albuterol Anxiety    Pantoprazole GI Intolerance    Sulfa Antibiotics Itching and Rash     Hands turned red w/a rash         Current Outpatient Medications:     acyclovir (ZOVIRAX) 400 MG tablet, Take 1 tablet by mouth 2 (Two) Times a Day. Take no more than 5 doses a day., Disp: , Rfl:     atorvastatin (LIPITOR) 40 MG tablet, Take 1 tablet by mouth Daily., Disp: , Rfl:     buPROPion XL (WELLBUTRIN XL) 300 MG 24 hr tablet, Take 1 tablet by mouth Daily., Disp: , Rfl:     carvedilol (COREG) 25 MG tablet, Take 1 tablet by mouth 2 (Two) Times a Day., Disp: , Rfl:     Entresto 49-51 MG tablet, Take 1 tablet by mouth Every 12 (Twelve) Hours., Disp: , Rfl:     furosemide (LASIX) 20 MG tablet, Take 1 tablet by mouth Every Other Day., Disp: , Rfl:     HYDROcodone-acetaminophen (NORCO) 7.5-325 MG per tablet, Take 1 tablet by mouth Every 4 (Four) Hours As Needed for Moderate Pain or Severe Pain., Disp: 40 tablet, Rfl: 0    ondansetron (Zofran) 4 MG tablet, Take 1 tablet by mouth Every 8 (Eight) Hours As Needed for Nausea or Vomiting for up to 10 doses., Disp: 10 tablet, Rfl: 0    PARoxetine (PAXIL) 20 MG tablet, Take 1 tablet by mouth Every Morning., Disp: , Rfl:     rivaroxaban (XARELTO) 10 MG tablet, Take 1 tablet by mouth Daily., Disp: 30 tablet, Rfl: 1    Semaglutide (Rybelsus) 14 MG tablet, Take 1 tablet by mouth Daily. HOLD AM OF SURGERY, Disp: , Rfl:     spironolactone (ALDACTONE) 25 MG tablet, TAKE 1 TABLET BY MOUTH EVERY DAY (Patient taking differently: Take 1 tablet by mouth Every Other Day.), Disp: 90 tablet, Rfl: 3    traZODone (DESYREL) 50 MG tablet, Take 1 tablet by mouth Daily., Disp: , Rfl:       Objective:     Vitals:    07/05/24 1235   BP: 112/80   Pulse: 78   Weight: 64.9 kg (143 lb)   Height: 157.5 cm (62\")     Body mass index is 26.16 kg/m².    PHYSICAL EXAM:    Vitals Reviewed.   General Appearance: No acute distress, well developed and well nourished.   Eyes: Conjunctiva and lids: " No erythema, swelling, or discharge. Sclera non-icteric.   HENT: Atraumatic, normocephalic. External eyes, ears, and nose normal.   Respiratory: No signs of respiratory distress. Respiration rhythm and depth normal.   Clear to auscultation. No rales, crackles, rhonchi, or wheezing auscultated.   Cardiovascular:  Heart Rate and Rhythm: Normal, Heart Sounds: Normal S1 and S2. No S3 or S4 noted.  Gastrointestinal:  Abdomen soft, non-distended, non-tender.   Musculoskeletal: Normal movement of extremities  Skin: Warm and dry.   Psychiatric: Patient alert and oriented to person, place, and time. Speech and behavior appropriate. Normal mood and affect.       ECG 12 Lead    Date/Time: 7/5/2024 1:15 PM  Performed by: Andi Suh APRN    Authorized by: Andi Suh APRN  Comparison: compared with previous ECG   Similar to previous ECG  Rhythm: sinus rhythm  Conduction: left bundle branch block            Assessment:       Diagnosis Plan   1. LBBB (left bundle branch block)        2. Biventricular implantable cardioverter-defibrillator in situ        3. Cardiomyopathy, nonischemic               Plan:   1-2. LBBB, BiV--- she had CRT-D placed in 2012 and has LV lead turned off due to fracture, it is an abandoned star fix lead. Her device testing and function is normal. She rarely paces, 0.1% in RV.       3. NICM--- she has class I symptoms, she has LBBB. Last LVEF was 40%. Would not consider new LV lead or extraction unless she develops HF symptoms.             Follow up in one year.           As always, it has been a pleasure to participate in your patient's care.      Sincerely,         HUSSEIN Lozano

## 2024-08-01 ENCOUNTER — TELEPHONE (OUTPATIENT)
Dept: URGENT CARE | Facility: CLINIC | Age: 79
End: 2024-08-01
Payer: MEDICARE

## 2024-08-02 NOTE — TELEPHONE ENCOUNTER
----- Message from Vanita Fisher sent at 8/1/2024  7:54 PM EDT -----  Urine culture grew mixed urogenital kelsy.  No predominant organism identified.  Unable to change or prescribe any new antibiotics at this time.  Please return to clinic for reevaluation if urinary tract symptoms persist.

## 2025-05-01 ENCOUNTER — OFFICE VISIT (OUTPATIENT)
Dept: ORTHOPEDIC SURGERY | Facility: CLINIC | Age: 80
End: 2025-05-01
Payer: MEDICARE

## 2025-05-01 VITALS — TEMPERATURE: 96.9 F | BODY MASS INDEX: 26.31 KG/M2 | WEIGHT: 143 LBS | HEIGHT: 62 IN

## 2025-05-01 DIAGNOSIS — R52 PAIN: Primary | ICD-10-CM

## 2025-05-01 DIAGNOSIS — M70.52 PES ANSERINUS BURSITIS OF LEFT KNEE: ICD-10-CM

## 2025-05-01 DIAGNOSIS — Z96.652 S/P TKR (TOTAL KNEE REPLACEMENT), LEFT: ICD-10-CM

## 2025-05-01 PROCEDURE — 99213 OFFICE O/P EST LOW 20 MIN: CPT | Performed by: NURSE PRACTITIONER

## 2025-05-01 NOTE — PROGRESS NOTES
"Olivia Addison : 1945 MRN: [id number] DATE: 2025    DIAGNOSIS: Annual follow up left total knee      SUBJECTIVE:Patient returns today for a 1 year follow up of left total knee replacement. Patient reports doing well with no unusual complaints.  Just has some minor discomfort to the medial aspect of the knee.  Rates it as an intermittent ache and when she has a pain it is about a 4 or 5 on a scale of 10.  Denies any limitations or instability issues due to the knee.  Denies any signs or symptoms of infection, and is without any other significant complaints today.    OBJECTIVE:    Temp 96.9 °F (36.1 °C) (Temporal)   Ht 157.5 cm (62\")   Wt 64.9 kg (143 lb)   BMI 26.16 kg/m²   Family History   Problem Relation Age of Onset    Diabetes Mother     Hypertension Mother     Emphysema Father     Anxiety disorder Father     Depression Father     Hypertension Father     Stroke Sister     No Known Problems Brother     Heart attack Maternal Grandmother     Sleep apnea Maternal Grandmother     Heart disease Maternal Grandmother     No Known Problems Maternal Grandfather     No Known Problems Paternal Grandmother     No Known Problems Paternal Grandfather     No Known Problems Brother     No Known Problems Daughter     Malig Hyperthermia Neg Hx      Past Medical History:   Diagnosis Date    ADHD (attention deficit hyperactivity disorder)     Adjustment disorder with anxiety     Allergic rhinitis     Anesthesia complication     SEVERE ITCHING WITH EPIDURAL    Anxiety and depression     Aortic valve replaced Same    Arthritis     Asthma Same    Atherosclerosis     Atrial fibrillation     HX    Atrophic vaginitis     Blepharitis of both eyes     CAD (coronary artery disease)     Cardiomyopathy     NON ISCHEMIC     Cataract     CHF (congestive heart failure)     CHRONIC SYSTOLIC    CKD (chronic kidney disease) stage 3, GFR 30-59 ml/min     DDD (degenerative disc disease), cervical     Diaphoresis 2019    SEEN AT " [facility] UC    Diverticulitis 09/02/2019    SEEN AT [facility] ER    Diverticulitis 11/15/2016    Diverticulitis 09/07/2014    Diverticulitis     Diverticulitis     Diverticulitis large intestine 05/23/2017    Diverticulitis of colon 08/22/2019    ADMITTED TO [facility]    Dry eye syndrome     DVT (deep venous thrombosis)     RIGHT LEFT POST HEART SURGERY    Dyspnea     Dysuria 06/2019    Esophageal injury     ESTRELLITA-RAMOS SYNDROME    Fatigue     Heart failure     Heart murmur Same    Hematuria 08/2019    History of COVID-19     History of frequent urinary tract infections     History of mitral valve repair     History of posttraumatic stress disorder (PTSD)     History of sepsis     FROM UTI    History of sepsis     History of transfusion 2012    Hypercholesterolemia     Hyperlipidemia     Hypertension     Kidney cysts 07/2017    FOLLOWED BY DR. RITU AMOS    LBBB (left bundle branch block)     Estrellita-Ramos syndrome     TEAR IN ESOPHAGUS AFTER HEART SURGERY    Memory loss     Mitral valve prolapse 2012    NSVT (nonsustained ventricular tachycardia)     Osteoarthritis of left knee     Osteoporosis     Patent foramen ovale     Pneumonia 05/25/2017    Presbyopia     Presence of cardiac defibrillator     PTSD (post-traumatic stress disorder)     Reactive airway disease     Regular astigmatism     RLS (restless legs syndrome)     S/P TVR (tricuspid valve repair)     Seborrheic keratosis     FOLLOWED BY DR. HARMAN CÁRDENAS    Segmental and somatic dysfunction of cervical region     Segmental and somatic dysfunction of thoracic region     Sleep apnea     NO LONGER USES MACHINE    SOB (shortness of breath) on exertion     R/T HEART    Vitamin D deficiency     VT (ventricular tachycardia)      Past Surgical History:   Procedure Laterality Date    AORTIC VALVE REPAIR/REPLACEMENT  Same    APPENDECTOMY N/A     BIVENTRICULLAR IMPLANTABLE CARDIOVERTER DEFIBRILLATOR PLACEMENT N/A 04/10/2012    [employer], BI-V, DR. MICKEY MORALES AT [facility]  SUSPENSION N/A     BLEPHAROPLASTY Bilateral 03/03/2022    Procedure: BILATERAL UPPER LID BLEPHAROPLASTY;  Surgeon: Edu Power MD;  Location:  [facility];  Service: Ophthalmology;  Laterality: Bilateral;    BLEPHAROPLASTY Bilateral 03/03/2022    Procedure: BILATERAL LOWER LID BLEPHAROPLASTY;  Surgeon: Edu Power MD;  Location:  [facility];  Service: New Image;  Laterality: Bilateral;    BREAST CYST ASPIRATION      BREAST SURGERY Bilateral     REDUCTION MAMMAPLASTY    BROW LIFT Bilateral 03/03/2022    Procedure: BILATERAL LATERAL BROWPEXY;  Surgeon: Edu Power MD;  Location:  [facility];  Service: Ophthalmology;  Laterality: Bilateral;    CARDIAC CATHETERIZATION Bilateral 03/19/2012    Normal coronary arteries, severe mitral regurgitation and aortic regurgitation. EF was about 30-35%.; DR. JYOTI ROMERO AT [facility] [place]    CARDIAC DEFIBRILLATOR PLACEMENT  2022    CARDIAC ELECTROPHYSIOLOGY PROCEDURE Left 07/11/2016    Procedure: PPM battery change [employer];  Surgeon: Hunter Faust MD;  Location: [facility] CATH INVASIVE LOCATION;  Service:     CARDIAC VALVE REPLACEMENT  Same    CATARACT EXTRACTION Bilateral     COLON SURGERY  2019    DR ROCK AT [facility]    COLONOSCOPY N/A 10/14/2014    PANDIVERTICULOSIS IN ENTIRE COLON, DR. BRANDEN RUIZ AT [facility]    COLONOSCOPY N/A 11/13/2019    SCATTERED SMALL AND LARGE DIVERTICULA IN ENTIRE COLON, RESCOPE IN 10 YRS, DR. PERCY MUHAMMAD AT [facility]    ENDOSCOPY N/A 06/06/2012    CHAD-RAMOS TEAR WITH ACTIVE BLEEDINGBLOOD THROUGHOUT STOMACH, BLOOD IN DUODENUM, DR. TARUN GILLIS AT [facility] [place]    ESOPHAGUS SURGERY  06/2012    TEAR DURING HEART SURGERY, AT BOTTOM OF ESOPHAGUS    HYSTERECTOMY N/A 1980    OLIVIA WITH BSO    IMPLANTABLE CARDIOVERTER DEFIBRILLATOR LEAD REPLACEMENT/POCKET REVISION N/A 08/28/2012    DR. GISELLE BOOGIE AT [facility] [place]    INSERT / REPLACE / REMOVE PACEMAKER  2012    MITRAL VALVE REPAIR/REPLACEMENT N/A 06/06/2012    REMODELING   ANULOPLASTY USING 24 MM BARAJAS ANULOPLASTY RING, MODEL 5200, SERIAL # [id number], DR. VINCE ZAMUDIO AT [facility]    PATENT FORAMEN OVALE CLOSURE N/A 06/06/2012    DR. VINCE ZAMUDIO AT [facility]    TONSILLECTOMY Bilateral     TOTAL KNEE ARTHROPLASTY Left 05/01/2024    Procedure: TOTAL KNEE ARTHROPLASTY;  Surgeon: Greyson Mccrary MD;  Location: [facility];  Service: Orthopedics;  Laterality: Left;    TRICUSPID VALVE SURGERY N/A 06/06/2012    REMODELING ANULOPLASTY USING 26 MM BARAAJS ANULOPLASTY RING, MODEL 6200, SERIAL # [id number], DR. VINCE ZAMUDIO AT [facility]    VAGINAL DELIVERY N/A 1974     Social History     Socioeconomic History    Marital status:    Tobacco Use    Smoking status: Never     Passive exposure: Never    Smokeless tobacco: Never   Vaping Use    Vaping status: Never Used   Substance and Sexual Activity    Alcohol use: Yes     Comment: WINE OCCASIONAL    Drug use: No    Sexual activity: Defer     Birth control/protection: Post-menopausal, Surgical     Review of Systems - a 14 point review of systems was performed. All systems were negative.    Exam:. The incision is well healed. Range of motion is measured at 0 to 120. The calf is soft and nontender with a negative Homans sign. Alignment is neutral. Good quad strength. There is no evidence of varus/valgus or flexion instability. No effusion. Intact to light touch with palpable distal pulses.  Tenderness to palpation noted over the Pez anserine bursa.    DIAGNOSTIC STUDIES  Xrays: 3 views of the left knee (AP, lateral, and sunrise) were ordered and reviewed for evaluation of recent knee replacement. They demonstrate a well positioned, well aligned knee replacement without complicating factors noted. In comparison with previous films there has been no change.    ASSESSMENT: Annual follow up left knee replacement /Pez anserine bursitis    PLAN:  Continue activities as tolerated   Will give a prescription for a topical NSAID cream to rub in the bursa region.   If not improved will come back for a bursal injection.  Can follow-up as needed.      Kwabena Mancilla, APRN  5/1/2025

## 2025-06-17 LAB
MC_CV_MDC_IDC_RATE_1: 133
MC_CV_MDC_IDC_RATE_1: 171
MC_CV_MDC_IDC_RATE_1: 171
MC_CV_MDC_IDC_RATE_1: 207
MC_CV_MDC_IDC_RATE_2: 171
MC_CV_MDC_IDC_RV_HV_IMPEDANCE: 34
MC_CV_MDC_IDC_SVC_MEASURED_IMPEDANCE: 48
MC_CV_MDC_IDC_THERAPIES: NORMAL
MC_CV_MDC_IDC_ZONE_ID: 2
MC_CV_MDC_IDC_ZONE_ID: 3
MC_CV_MDC_IDC_ZONE_ID: 4
MC_CV_MDC_IDC_ZONE_ID: 5
MC_CV_MDC_IDC_ZONE_ID: 6
MDC_IDC_MSMT_BATTERY_REMAINING_LONGEVITY: 6 MO
MDC_IDC_MSMT_BATTERY_RRT_TRIGGER: 2.73
MDC_IDC_MSMT_BATTERY_STATUS: NORMAL
MDC_IDC_MSMT_BATTERY_VOLTAGE: 2.84
MDC_IDC_MSMT_CAP_CHARGE_TIME: 4.7
MDC_IDC_MSMT_LEADCHNL_LV_DTM: NORMAL
MDC_IDC_MSMT_LEADCHNL_LV_IMPEDANCE_VALUE: 4047
MDC_IDC_MSMT_LEADCHNL_LV_PACING_THRESHOLD_AMPLITUDE: 1.38
MDC_IDC_MSMT_LEADCHNL_LV_PACING_THRESHOLD_POLARITY: NORMAL
MDC_IDC_MSMT_LEADCHNL_LV_PACING_THRESHOLD_PULSEWIDTH: 1.2
MDC_IDC_MSMT_LEADCHNL_RA_DTM: NORMAL
MDC_IDC_MSMT_LEADCHNL_RA_IMPEDANCE_VALUE: 380
MDC_IDC_MSMT_LEADCHNL_RA_PACING_THRESHOLD_AMPLITUDE: 0.62
MDC_IDC_MSMT_LEADCHNL_RA_PACING_THRESHOLD_POLARITY: NORMAL
MDC_IDC_MSMT_LEADCHNL_RA_PACING_THRESHOLD_PULSEWIDTH: 0.4
MDC_IDC_MSMT_LEADCHNL_RA_SENSING_INTR_AMPL: 1.5
MDC_IDC_MSMT_LEADCHNL_RV_DTM: NORMAL
MDC_IDC_MSMT_LEADCHNL_RV_IMPEDANCE_VALUE: 380
MDC_IDC_MSMT_LEADCHNL_RV_PACING_THRESHOLD_AMPLITUDE: 0.88
MDC_IDC_MSMT_LEADCHNL_RV_PACING_THRESHOLD_POLARITY: NORMAL
MDC_IDC_MSMT_LEADCHNL_RV_PACING_THRESHOLD_PULSEWIDTH: 0.4
MDC_IDC_MSMT_LEADCHNL_RV_SENSING_INTR_AMPL: 17.75
MDC_IDC_PG_MFG: NORMAL
MDC_IDC_PG_MODEL: NORMAL
MDC_IDC_PG_SERIAL: NORMAL
MDC_IDC_PG_TYPE: NORMAL
MDC_IDC_SESS_DTM: NORMAL
MDC_IDC_SESS_TYPE: NORMAL
MDC_IDC_SET_BRADY_AT_MODE_SWITCH_RATE: 171
MDC_IDC_SET_BRADY_LOWRATE: 50
MDC_IDC_SET_BRADY_MAX_SENSOR_RATE: 115
MDC_IDC_SET_BRADY_MAX_TRACKING_RATE: 115
MDC_IDC_SET_BRADY_MODE: NORMAL
MDC_IDC_SET_BRADY_PAV_DELAY: 270
MDC_IDC_SET_BRADY_SAV_DELAY: 240
MDC_IDC_SET_CRT_PACED_CHAMBERS: NORMAL
MDC_IDC_SET_LEADCHNL_RA_PACING_AMPLITUDE: 1.75
MDC_IDC_SET_LEADCHNL_RA_PACING_POLARITY: NORMAL
MDC_IDC_SET_LEADCHNL_RA_PACING_PULSEWIDTH: 0.4
MDC_IDC_SET_LEADCHNL_RA_SENSING_POLARITY: NORMAL
MDC_IDC_SET_LEADCHNL_RA_SENSING_SENSITIVITY: 0.3
MDC_IDC_SET_LEADCHNL_RV_PACING_AMPLITUDE: 2
MDC_IDC_SET_LEADCHNL_RV_PACING_POLARITY: NORMAL
MDC_IDC_SET_LEADCHNL_RV_PACING_PULSEWIDTH: 0.4
MDC_IDC_SET_LEADCHNL_RV_SENSING_POLARITY: NORMAL
MDC_IDC_SET_LEADCHNL_RV_SENSING_SENSITIVITY: 0.3
MDC_IDC_SET_ZONE_STATUS: NORMAL
MDC_IDC_SET_ZONE_TYPE: NORMAL
MDC_IDC_STAT_AT_BURDEN_PERCENT: 0
MDC_IDC_STAT_BRADY_RA_PERCENT_PACED: 1.2
MDC_IDC_STAT_TACHYTHERAPY_ATP_DELIVERED_RECENT: 0
MDC_IDC_STAT_TACHYTHERAPY_SHOCKS_ABORTED_RECENT: 0
MDC_IDC_STAT_TACHYTHERAPY_SHOCKS_DELIVERED_RECENT: 0

## 2025-07-11 ENCOUNTER — OFFICE VISIT (OUTPATIENT)
Age: 80
End: 2025-07-11
Payer: MEDICARE

## 2025-07-11 VITALS
DIASTOLIC BLOOD PRESSURE: 70 MMHG | HEART RATE: 65 BPM | BODY MASS INDEX: 27.79 KG/M2 | SYSTOLIC BLOOD PRESSURE: 130 MMHG | HEIGHT: 62 IN | WEIGHT: 151 LBS

## 2025-07-11 DIAGNOSIS — I44.7 LBBB (LEFT BUNDLE BRANCH BLOCK): ICD-10-CM

## 2025-07-11 DIAGNOSIS — I42.8 CARDIOMYOPATHY, NONISCHEMIC: ICD-10-CM

## 2025-07-11 DIAGNOSIS — Z98.890 S/P MVR (MITRAL VALVE REPAIR): ICD-10-CM

## 2025-07-11 DIAGNOSIS — Z98.890 H/O TRICUSPID VALVE REPAIR: ICD-10-CM

## 2025-07-11 DIAGNOSIS — Z95.810 BIVENTRICULAR IMPLANTABLE CARDIOVERTER-DEFIBRILLATOR IN SITU: Primary | ICD-10-CM

## 2025-07-11 NOTE — PROGRESS NOTES
Date of Office Visit: 2025  Encounter Provider: Vincent Randall MD  Place of Service: [facility] CARDIOLOGY  Patient Name: Olivia Addison  : 1945    Subjective:     Encounter Date:2025      Patient ID: Olivia Addison is a 80 y.o. female who has a cc of  NICM and MV repair and CRT D with a fractured LV lead (StarFIx) and we have her pacing off and she has a LBBB and EF of 45%     She has 5 months on the CRT -D and shocks are on. No shock or VT has been needed.     She has been pretty good. A week ago she had a paroxysmal wheezing and the next day she was better.     She gets mild hall on 2-3 flights of stairs.     She also sees [facility]       Past Medical History:   Diagnosis Date    ADHD (attention deficit hyperactivity disorder)     Adjustment disorder with anxiety     Allergic rhinitis     Anesthesia complication     SEVERE ITCHING WITH EPIDURAL    Anxiety and depression     Aortic valve replaced Same    Arthritis     Asthma Same    Atherosclerosis     Atrial fibrillation     HX    Atrophic vaginitis     Blepharitis of both eyes     CAD (coronary artery disease)     Cardiomyopathy     NON ISCHEMIC     Cataract     CHF (congestive heart failure)     CHRONIC SYSTOLIC    CKD (chronic kidney disease) stage 3, GFR 30-59 ml/min     DDD (degenerative disc disease), cervical     Diaphoresis 2019    SEEN AT [facility]    Diverticulitis 2019    SEEN AT [facility]    Diverticulitis 11/15/2016    Diverticulitis 2014    Diverticulitis     Diverticulitis     Diverticulitis large intestine 2017    Diverticulitis of colon 2019    ADMITTED TO [facility]    Dry eye syndrome     DVT (deep venous thrombosis)     RIGHT LEFT POST HEART SURGERY    Dyspnea     Dysuria 2019    Esophageal injury     CHAD-RAMOS SYNDROME    Fatigue     Heart failure     Heart murmur Same    Hematuria 2019    History of COVID-19     History of frequent urinary tract infections     History of  mitral valve repair     History of posttraumatic stress disorder (PTSD)     History of sepsis     FROM UTI    History of sepsis     History of transfusion 2012    Hypercholesterolemia     Hyperlipidemia     Hypertension     Kidney cysts 07/2017    FOLLOWED BY DR. RITU AMOS    LBBB (left bundle branch block)     Estrellita-Biswas syndrome     TEAR IN ESOPHAGUS AFTER HEART SURGERY    Memory loss     Mitral valve prolapse 2012    NSVT (nonsustained ventricular tachycardia)     Osteoarthritis of left knee     Osteoporosis     Patent foramen ovale     Pneumonia 05/25/2017    Presbyopia     Presence of cardiac defibrillator     PTSD (post-traumatic stress disorder)     Reactive airway disease     Regular astigmatism     RLS (restless legs syndrome)     S/P TVR (tricuspid valve repair)     Seborrheic keratosis     FOLLOWED BY DR. HARMAN CÁRDENAS    Segmental and somatic dysfunction of cervical region     Segmental and somatic dysfunction of thoracic region     Sleep apnea     NO LONGER USES MACHINE    SOB (shortness of breath) on exertion     R/T HEART    Vitamin D deficiency     VT (ventricular tachycardia)        Social History     Socioeconomic History    Marital status:    Tobacco Use    Smoking status: Never     Passive exposure: Never    Smokeless tobacco: Never   Vaping Use    Vaping status: Never Used   Substance and Sexual Activity    Alcohol use: Yes     Comment: WINE OCCASIONAL    Drug use: No    Sexual activity: Defer     Birth control/protection: Post-menopausal, Surgical       Family History   Problem Relation Age of Onset    Diabetes Mother     Hypertension Mother     Emphysema Father     Anxiety disorder Father     Depression Father     Hypertension Father     Stroke Sister     No Known Problems Brother     Heart attack Maternal Grandmother     Sleep apnea Maternal Grandmother     Heart disease Maternal Grandmother     No Known Problems Maternal Grandfather     No Known Problems Paternal Grandmother   "   No Known Problems Paternal Grandfather     No Known Problems Brother     No Known Problems Daughter     Malig Hyperthermia Neg Hx        Review of Systems   Constitutional: Negative for fever and night sweats.   HENT:  Negative for ear pain and stridor.    Eyes:  Negative for discharge and visual halos.   Cardiovascular:  Negative for cyanosis.   Respiratory:  Negative for hemoptysis and sputum production.    Hematologic/Lymphatic: Negative for adenopathy.   Skin:  Negative for nail changes and unusual hair distribution.   Musculoskeletal:  Negative for gout and joint swelling.   Gastrointestinal:  Negative for bowel incontinence and flatus.   Genitourinary:  Negative for dysuria and flank pain.   Neurological:  Negative for seizures and tremors.   Psychiatric/Behavioral:  Negative for altered mental status. The patient is not nervous/anxious.             Objective:     Vitals:    07/11/25 1104   BP: 130/70   BP Location: Left arm   Patient Position: Sitting   Cuff Size: Adult   Pulse: 65   Weight: 68.5 kg (151 lb)   Height: 157.5 cm (62\")         Eyes:      General:         Right eye: No discharge.         Left eye: No discharge.   HENT:      Head: Normocephalic and atraumatic.   Neck:      Thyroid: No thyromegaly.      Vascular: No JVD.   Pulmonary:      Effort: Pulmonary effort is normal.      Breath sounds: Normal breath sounds. No rales.   Cardiovascular:      Normal rate. Regular rhythm.      No gallop.    Edema:     Peripheral edema absent.   Abdominal:      General: Bowel sounds are normal.      Palpations: Abdomen is soft.      Tenderness: There is no abdominal tenderness.   Musculoskeletal: Normal range of motion.         General: No deformity. Skin:     General: Skin is warm and dry.      Findings: No erythema.   Neurological:      Mental Status: Alert and oriented to person, place, and time.      Motor: Normal muscle tone.   Psychiatric:         Behavior: Behavior normal.         Thought Content: " "Thought content normal.           ECG 12 Lead    Date/Time: 7/11/2025 11:38 AM  Performed by: Vincent Randall MD    Authorized by: Vincent Randall MD  Comparison: compared with previous ECG   Similar to previous ECG  Rhythm: sinus rhythm  Conduction: left bundle branch block          Lab Review:       Assessment:          Diagnosis Plan   1. Biventricular implantable cardioverter-defibrillator in situ        2. H/O tricuspid valve repair        3. S/P MVR (mitral valve repair)        4. LBBB (left bundle branch block)        5. Cardiomyopathy, nonischemic               Plan:     She is fine. On GDMT and EF 45%. Does not pace. Has LBBB x 13 years     5 months to ZAIN     I rec against a gen change:     No need for pacing  No longer has ICD eligibilty (EF too good)   Star Fix lead is super high risk if she had infection     I explained this to her     She requested ICD deactivation because she does not want a shock if she has sudden death --\"I am almost 81\"         "

## 2025-07-12 LAB
MC_CV_MDC_IDC_RATE_1: 171
MC_CV_MDC_IDC_RV_HV_IMPEDANCE: 36
MC_CV_MDC_IDC_SVC_MEASURED_IMPEDANCE: 51
MC_CV_MDC_IDC_THERAPIES: NORMAL
MC_CV_MDC_IDC_ZONE_ID: 2
MC_CV_MDC_IDC_ZONE_ID: 3
MC_CV_MDC_IDC_ZONE_ID: 4
MC_CV_MDC_IDC_ZONE_ID: 5
MC_CV_MDC_IDC_ZONE_ID: 6
MDC_IDC_MSMT_BATTERY_REMAINING_LONGEVITY: 5 MO
MDC_IDC_MSMT_BATTERY_RRT_TRIGGER: 2.73
MDC_IDC_MSMT_BATTERY_STATUS: NORMAL
MDC_IDC_MSMT_BATTERY_VOLTAGE: 2.81
MDC_IDC_MSMT_CAP_CHARGE_TIME: 4.87
MDC_IDC_MSMT_LEADCHNL_LV_DTM: NORMAL
MDC_IDC_MSMT_LEADCHNL_LV_IMPEDANCE_VALUE: 4047
MDC_IDC_MSMT_LEADCHNL_LV_PACING_THRESHOLD_AMPLITUDE: 1.38
MDC_IDC_MSMT_LEADCHNL_LV_PACING_THRESHOLD_POLARITY: NORMAL
MDC_IDC_MSMT_LEADCHNL_LV_PACING_THRESHOLD_PULSEWIDTH: 1.2
MDC_IDC_MSMT_LEADCHNL_RA_DTM: NORMAL
MDC_IDC_MSMT_LEADCHNL_RA_IMPEDANCE_VALUE: 342
MDC_IDC_MSMT_LEADCHNL_RA_PACING_THRESHOLD_AMPLITUDE: 0.75
MDC_IDC_MSMT_LEADCHNL_RA_PACING_THRESHOLD_POLARITY: NORMAL
MDC_IDC_MSMT_LEADCHNL_RA_PACING_THRESHOLD_PULSEWIDTH: 0.4
MDC_IDC_MSMT_LEADCHNL_RA_SENSING_INTR_AMPL: 1.38
MDC_IDC_MSMT_LEADCHNL_RV_DTM: NORMAL
MDC_IDC_MSMT_LEADCHNL_RV_IMPEDANCE_VALUE: 399
MDC_IDC_MSMT_LEADCHNL_RV_PACING_THRESHOLD_AMPLITUDE: 0.88
MDC_IDC_MSMT_LEADCHNL_RV_PACING_THRESHOLD_POLARITY: NORMAL
MDC_IDC_MSMT_LEADCHNL_RV_PACING_THRESHOLD_PULSEWIDTH: 0.4
MDC_IDC_MSMT_LEADCHNL_RV_SENSING_INTR_AMPL: 18.88
MDC_IDC_PG_IMPLANT_DTM: NORMAL
MDC_IDC_PG_MFG: NORMAL
MDC_IDC_PG_MODEL: NORMAL
MDC_IDC_PG_SERIAL: NORMAL
MDC_IDC_PG_TYPE: NORMAL
MDC_IDC_SESS_DTM: NORMAL
MDC_IDC_SESS_TYPE: NORMAL
MDC_IDC_SET_BRADY_LOWRATE: 40
MDC_IDC_SET_BRADY_MODE: NORMAL
MDC_IDC_SET_CRT_PACED_CHAMBERS: NORMAL
MDC_IDC_SET_LEADCHNL_RA_SENSING_POLARITY: NORMAL
MDC_IDC_SET_LEADCHNL_RA_SENSING_SENSITIVITY: 0.3
MDC_IDC_SET_LEADCHNL_RV_PACING_AMPLITUDE: 2
MDC_IDC_SET_LEADCHNL_RV_PACING_POLARITY: NORMAL
MDC_IDC_SET_LEADCHNL_RV_PACING_PULSEWIDTH: 0.4
MDC_IDC_SET_LEADCHNL_RV_SENSING_POLARITY: NORMAL
MDC_IDC_SET_LEADCHNL_RV_SENSING_SENSITIVITY: 0.3
MDC_IDC_SET_ZONE_STATUS: NORMAL
MDC_IDC_SET_ZONE_TYPE: NORMAL
MDC_IDC_STAT_AT_BURDEN_PERCENT: 0
MDC_IDC_STAT_BRADY_RA_PERCENT_PACED: 0
MDC_IDC_STAT_TACHYTHERAPY_ATP_DELIVERED_RECENT: 0
MDC_IDC_STAT_TACHYTHERAPY_SHOCKS_ABORTED_RECENT: 0
MDC_IDC_STAT_TACHYTHERAPY_SHOCKS_DELIVERED_RECENT: 0

## 2025-07-25 LAB
MC_CV_MDC_IDC_RATE_1: 171
MC_CV_MDC_IDC_RV_HV_IMPEDANCE: 36
MC_CV_MDC_IDC_SVC_MEASURED_IMPEDANCE: 51
MC_CV_MDC_IDC_THERAPIES: NORMAL
MC_CV_MDC_IDC_ZONE_ID: 2
MC_CV_MDC_IDC_ZONE_ID: 3
MC_CV_MDC_IDC_ZONE_ID: 4
MC_CV_MDC_IDC_ZONE_ID: 5
MC_CV_MDC_IDC_ZONE_ID: 6
MDC_IDC_MSMT_BATTERY_REMAINING_LONGEVITY: 5 MO
MDC_IDC_MSMT_BATTERY_RRT_TRIGGER: 2.73
MDC_IDC_MSMT_BATTERY_STATUS: NORMAL
MDC_IDC_MSMT_BATTERY_VOLTAGE: 2.81
MDC_IDC_MSMT_CAP_CHARGE_TIME: 4.87
MDC_IDC_MSMT_LEADCHNL_LV_DTM: NORMAL
MDC_IDC_MSMT_LEADCHNL_LV_IMPEDANCE_VALUE: 4047
MDC_IDC_MSMT_LEADCHNL_LV_PACING_THRESHOLD_AMPLITUDE: 1.38
MDC_IDC_MSMT_LEADCHNL_LV_PACING_THRESHOLD_POLARITY: NORMAL
MDC_IDC_MSMT_LEADCHNL_LV_PACING_THRESHOLD_PULSEWIDTH: 1.2
MDC_IDC_MSMT_LEADCHNL_RA_DTM: NORMAL
MDC_IDC_MSMT_LEADCHNL_RA_IMPEDANCE_VALUE: 342
MDC_IDC_MSMT_LEADCHNL_RA_PACING_THRESHOLD_AMPLITUDE: 0.75
MDC_IDC_MSMT_LEADCHNL_RA_PACING_THRESHOLD_POLARITY: NORMAL
MDC_IDC_MSMT_LEADCHNL_RA_PACING_THRESHOLD_PULSEWIDTH: 0.4
MDC_IDC_MSMT_LEADCHNL_RA_SENSING_INTR_AMPL: 1.38
MDC_IDC_MSMT_LEADCHNL_RV_DTM: NORMAL
MDC_IDC_MSMT_LEADCHNL_RV_IMPEDANCE_VALUE: 399
MDC_IDC_MSMT_LEADCHNL_RV_PACING_THRESHOLD_AMPLITUDE: 0.88
MDC_IDC_MSMT_LEADCHNL_RV_PACING_THRESHOLD_POLARITY: NORMAL
MDC_IDC_MSMT_LEADCHNL_RV_PACING_THRESHOLD_PULSEWIDTH: 0.4
MDC_IDC_MSMT_LEADCHNL_RV_SENSING_INTR_AMPL: 18.88
MDC_IDC_PG_IMPLANT_DTM: NORMAL
MDC_IDC_PG_MFG: NORMAL
MDC_IDC_PG_MODEL: NORMAL
MDC_IDC_PG_SERIAL: NORMAL
MDC_IDC_PG_TYPE: NORMAL
MDC_IDC_SESS_DTM: NORMAL
MDC_IDC_SESS_TYPE: NORMAL
MDC_IDC_SET_BRADY_LOWRATE: 40
MDC_IDC_SET_BRADY_MODE: NORMAL
MDC_IDC_SET_CRT_PACED_CHAMBERS: NORMAL
MDC_IDC_SET_LEADCHNL_RA_SENSING_POLARITY: NORMAL
MDC_IDC_SET_LEADCHNL_RA_SENSING_SENSITIVITY: 0.3
MDC_IDC_SET_LEADCHNL_RV_PACING_AMPLITUDE: 2
MDC_IDC_SET_LEADCHNL_RV_PACING_POLARITY: NORMAL
MDC_IDC_SET_LEADCHNL_RV_PACING_PULSEWIDTH: 0.4
MDC_IDC_SET_LEADCHNL_RV_SENSING_POLARITY: NORMAL
MDC_IDC_SET_LEADCHNL_RV_SENSING_SENSITIVITY: 0.3
MDC_IDC_SET_ZONE_STATUS: NORMAL
MDC_IDC_SET_ZONE_TYPE: NORMAL
MDC_IDC_STAT_AT_BURDEN_PERCENT: 0
MDC_IDC_STAT_BRADY_RA_PERCENT_PACED: 0
MDC_IDC_STAT_TACHYTHERAPY_ATP_DELIVERED_RECENT: 0
MDC_IDC_STAT_TACHYTHERAPY_SHOCKS_ABORTED_RECENT: 0
MDC_IDC_STAT_TACHYTHERAPY_SHOCKS_DELIVERED_RECENT: 0

## (undated) DEVICE — DRSNG SURESITE WNDW 2.38X2.75

## (undated) DEVICE — GLV SURG SENSICARE W/ALOE PF LF 7.5 STRL

## (undated) DEVICE — INTENDED FOR TISSUE SEPARATION, AND OTHER PROCEDURES THAT REQUIRE A SHARP SURGICAL BLADE TO PUNCTURE OR CUT.: Brand: BARD-PARKER ® CARBON RIB-BACK BLADES

## (undated) DEVICE — GLV SURG BIOGEL SENSR LTX PF SZ7.5

## (undated) DEVICE — HDRST POSITIONING FM RND 2X9IN

## (undated) DEVICE — PK KN TOTL 40

## (undated) DEVICE — APPL DURAPREP IODOPHOR APL 26ML

## (undated) DEVICE — KT VLV BIOGUARD SXN BIOP AIR/H20 CONN 4PC DISP

## (undated) DEVICE — THE TORRENT IRRIGATION SCOPE CONNECTOR IS USED WITH THE TORRENT IRRIGATION TUBING TO PROVIDE IRRIGATION FLUIDS SUCH AS STERILE WATER DURING GASTROINTESTINAL ENDOSCOPIC PROCEDURES WHEN USED IN CONJUNCTION WITH AN IRRIGATION PUMP (OR ELECTROSURGICAL UNIT).: Brand: TORRENT

## (undated) DEVICE — CANN NASL CO2 TRULINK W/O2 A/

## (undated) DEVICE — SYS CLS SKIN PREMIERPRO EXOFINFUSION 22CM

## (undated) DEVICE — SUT PROLN 4/0 P3 18IN 8699G

## (undated) DEVICE — TBG PENCL TELESCP MEGADYNE SMOKE EVAC 10FT

## (undated) DEVICE — SENSR O2 OXIMAX FNGR A/ 18IN NONSTR

## (undated) DEVICE — PREP SOL POVIDONE/IODINE BT 4OZ

## (undated) DEVICE — TRAP FLD MINIVAC MEGADYNE 100ML

## (undated) DEVICE — STERILE TOOTHPICK SET: Brand: CENTURION

## (undated) DEVICE — NDL HYPO PRECISIONGLIDE REG 25G 1 1/2

## (undated) DEVICE — SUT GUT PLN FAST ABS 5/0 PC1 18IN 1915G

## (undated) DEVICE — BNDG,ELSTC,MATRIX,STRL,6"X5YD,LF,HOOK&LP: Brand: MEDLINE

## (undated) DEVICE — PREMIUM WET SKIN PREP TRAY: Brand: MEDLINE INDUSTRIES, INC.

## (undated) DEVICE — STERILE COTTON TIP 6IN 10PK: Brand: MEDLINE

## (undated) DEVICE — GLV SURG SENSICARE PI PF LF 7 GRN STRL

## (undated) DEVICE — SWABSTK SKINPREP PVPI LF PK/3

## (undated) DEVICE — KT DRN EVAC WND PVC PCH WTROC RND 10F400

## (undated) DEVICE — PK ENT 40

## (undated) DEVICE — ANTIBACTERIAL UNDYED BRAIDED (POLYGLACTIN 910), SYNTHETIC ABSORBABLE SUTURE: Brand: COATED VICRYL

## (undated) DEVICE — SUT VIC 1 CT1 36IN J947H

## (undated) DEVICE — SUT PROLN 5/0 P3 18IN 8698G

## (undated) DEVICE — SUT VIC 6/0 P3 18IN J492G

## (undated) DEVICE — GLV SURG SENSICARE W/ALOE PF LF 8 STRL

## (undated) DEVICE — UNDERCAST PADDING: Brand: DEROYAL

## (undated) DEVICE — TUBING, SUCTION, 1/4" X 10', STRAIGHT: Brand: MEDLINE

## (undated) DEVICE — PATIENT RETURN ELECTRODE, SINGLE-USE, CONTACT QUALITY MONITORING, ADULT, WITH 9FT CORD, FOR PATIENTS WEIGING OVER 33LBS. (15KG): Brand: MEGADYNE

## (undated) DEVICE — WIPE INST MEROCEL

## (undated) DEVICE — DUAL CUT SAGITTAL BLADE

## (undated) DEVICE — ELECTRD NDL EZ CLN MOD 2.75IN

## (undated) DEVICE — MEDI-VAC YANKAUER SUCTION HANDLE W/BULBOUS TIP: Brand: CARDINAL HEALTH

## (undated) DEVICE — THE STERILE LIGHT HANDLE COVER IS USED WITH STERIS SURGICAL LIGHTING AND VISUALIZATION SYSTEMS.

## (undated) DEVICE — ELECTRD BLD EZ CLN MOD 2.5IN

## (undated) DEVICE — STERILE PATIENT PROTECTIVE PAD FOR IMP® KNEE POSITIONERS & COHESIVE WRAP (10 / CASE): Brand: DE MAYO KNEE POSITIONER®

## (undated) DEVICE — CROUCH CORNEAL PROTECTOR: Brand: BAUSCH + LOMB

## (undated) DEVICE — NEEDLE, QUINCKE 22GX3.5": Brand: MEDLINE INDUSTRIES, INC.

## (undated) DEVICE — UNDERGLV SURG BIOGEL INDICATOR LF PF 7.5

## (undated) DEVICE — 3M™ IOBAN™ 2 ANTIMICROBIAL INCISE DRAPE 6640EZ: Brand: IOBAN™ 2

## (undated) DEVICE — 450 ML BOTTLE OF 0.05% CHLORHEXIDINE GLUCONATE IN 99.95% STERILE WATER FOR IRRIGATION, USP AND APPLICATOR.: Brand: IRRISEPT ANTIMICROBIAL WOUND LAVAGE

## (undated) DEVICE — SOL IRR NACL 0.9PCT 3000ML

## (undated) DEVICE — GLV SURG SENSICARE PI MIC PF SZ7 LF STRL

## (undated) DEVICE — GLV SURG BIOGEL M LTX PF 7 1/2

## (undated) DEVICE — SUT VIC 5/0 P3 18IN J493G